# Patient Record
Sex: FEMALE | Employment: FULL TIME | URBAN - METROPOLITAN AREA
[De-identification: names, ages, dates, MRNs, and addresses within clinical notes are randomized per-mention and may not be internally consistent; named-entity substitution may affect disease eponyms.]

---

## 2017-01-30 ENCOUNTER — ALLSCRIPTS OFFICE VISIT (OUTPATIENT)
Dept: OTHER | Facility: OTHER | Age: 47
End: 2017-01-30

## 2017-01-30 ENCOUNTER — HOSPITAL ENCOUNTER (OUTPATIENT)
Dept: RADIOLOGY | Facility: HOSPITAL | Age: 47
Discharge: HOME | End: 2017-01-30
Payer: COMMERCIAL

## 2017-01-30 DIAGNOSIS — R06.02 SHORTNESS OF BREATH: ICD-10-CM

## 2017-01-30 PROCEDURE — 71020 HB CHEST X-RAY 2VW FRONTAL&LATL: CPT

## 2017-01-31 ENCOUNTER — GENERIC CONVERSION - ENCOUNTER (OUTPATIENT)
Dept: OTHER | Facility: OTHER | Age: 47
End: 2017-01-31

## 2017-03-09 ENCOUNTER — ALLSCRIPTS OFFICE VISIT (OUTPATIENT)
Dept: OTHER | Facility: OTHER | Age: 47
End: 2017-03-09

## 2017-03-09 ENCOUNTER — LAB REQUISITION (OUTPATIENT)
Dept: LAB | Facility: HOSPITAL | Age: 47
End: 2017-03-09
Payer: COMMERCIAL

## 2017-03-09 ENCOUNTER — GENERIC CONVERSION - ENCOUNTER (OUTPATIENT)
Dept: OTHER | Facility: OTHER | Age: 47
End: 2017-03-09

## 2017-03-09 DIAGNOSIS — Z11.3 ENCOUNTER FOR SCREENING FOR INFECTIONS WITH PREDOMINANTLY SEXUAL MODE OF TRANSMISSION: ICD-10-CM

## 2017-03-09 DIAGNOSIS — Z00.00 ENCOUNTER FOR GENERAL ADULT MEDICAL EXAMINATION WITHOUT ABNORMAL FINDINGS: ICD-10-CM

## 2017-03-09 DIAGNOSIS — R30.0 DYSURIA: ICD-10-CM

## 2017-03-09 PROCEDURE — 87591 N.GONORRHOEAE DNA AMP PROB: CPT | Performed by: OBSTETRICS & GYNECOLOGY

## 2017-03-09 PROCEDURE — 87491 CHLMYD TRACH DNA AMP PROBE: CPT | Performed by: OBSTETRICS & GYNECOLOGY

## 2017-03-09 PROCEDURE — 87624 HPV HI-RISK TYP POOLED RSLT: CPT | Performed by: OBSTETRICS & GYNECOLOGY

## 2017-03-09 PROCEDURE — G0145 SCR C/V CYTO,THINLAYER,RESCR: HCPCS | Performed by: OBSTETRICS & GYNECOLOGY

## 2017-03-09 PROCEDURE — 87086 URINE CULTURE/COLONY COUNT: CPT | Performed by: OBSTETRICS & GYNECOLOGY

## 2017-03-09 PROCEDURE — 87147 CULTURE TYPE IMMUNOLOGIC: CPT | Performed by: OBSTETRICS & GYNECOLOGY

## 2017-03-10 ENCOUNTER — LAB REQUISITION (OUTPATIENT)
Dept: LAB | Facility: HOSPITAL | Age: 47
End: 2017-03-10
Payer: COMMERCIAL

## 2017-03-10 DIAGNOSIS — Z11.3 ENCOUNTER FOR SCREENING FOR INFECTIONS WITH PREDOMINANTLY SEXUAL MODE OF TRANSMISSION: ICD-10-CM

## 2017-03-10 DIAGNOSIS — Z01.419 ENCOUNTER FOR GYNECOLOGICAL EXAMINATION WITHOUT ABNORMAL FINDING: ICD-10-CM

## 2017-03-10 DIAGNOSIS — Z00.00 ENCOUNTER FOR GENERAL ADULT MEDICAL EXAMINATION WITHOUT ABNORMAL FINDINGS: ICD-10-CM

## 2017-03-10 LAB
CHLAMYDIA DNA CVX QL NAA+PROBE: NORMAL
N GONORRHOEA DNA GENITAL QL NAA+PROBE: NORMAL

## 2017-03-14 LAB
BACTERIA UR CULT: NORMAL
BACTERIA UR CULT: NORMAL

## 2017-03-15 ENCOUNTER — GENERIC CONVERSION - ENCOUNTER (OUTPATIENT)
Dept: OTHER | Facility: OTHER | Age: 47
End: 2017-03-15

## 2017-03-15 LAB
HPV RRNA GENITAL QL NAA+PROBE: NORMAL
LAB AP GYN PRIMARY INTERPRETATION: NORMAL
Lab: NORMAL

## 2017-07-17 ENCOUNTER — ALLSCRIPTS OFFICE VISIT (OUTPATIENT)
Dept: OTHER | Facility: OTHER | Age: 47
End: 2017-07-17

## 2017-07-17 ENCOUNTER — APPOINTMENT (OUTPATIENT)
Dept: LAB | Facility: HOSPITAL | Age: 47
End: 2017-07-17
Payer: COMMERCIAL

## 2017-07-17 DIAGNOSIS — N93.8 OTHER SPECIFIED ABNORMAL UTERINE AND VAGINAL BLEEDING: ICD-10-CM

## 2017-07-17 DIAGNOSIS — I10 ESSENTIAL (PRIMARY) HYPERTENSION: ICD-10-CM

## 2017-07-17 LAB
ALBUMIN SERPL BCP-MCNC: 3.4 G/DL (ref 3.5–5)
ALP SERPL-CCNC: 64 U/L (ref 46–116)
ALT SERPL W P-5'-P-CCNC: 33 U/L (ref 12–78)
ANION GAP SERPL CALCULATED.3IONS-SCNC: 10 MMOL/L (ref 4–13)
AST SERPL W P-5'-P-CCNC: 26 U/L (ref 5–45)
BASOPHILS # BLD AUTO: 0.04 THOUSANDS/ΜL (ref 0–0.1)
BASOPHILS NFR BLD AUTO: 0 % (ref 0–1)
BILIRUB SERPL-MCNC: 0.32 MG/DL (ref 0.2–1)
BUN SERPL-MCNC: 9 MG/DL (ref 5–25)
CALCIUM SERPL-MCNC: 8.5 MG/DL (ref 8.3–10.1)
CHLORIDE SERPL-SCNC: 104 MMOL/L (ref 100–108)
CO2 SERPL-SCNC: 24 MMOL/L (ref 21–32)
CORTIS AM PEAK SERPL-MCNC: 8.3 UG/DL (ref 4.2–22.4)
CREAT SERPL-MCNC: 0.58 MG/DL (ref 0.6–1.3)
EOSINOPHIL # BLD AUTO: 0.2 THOUSAND/ΜL (ref 0–0.61)
EOSINOPHIL NFR BLD AUTO: 2 % (ref 0–6)
ERYTHROCYTE [DISTWIDTH] IN BLOOD BY AUTOMATED COUNT: 15.4 % (ref 11.6–15.1)
FSH SERPL-ACNC: 3.8 MIU/ML
GFR SERPL CREATININE-BSD FRML MDRD: >60 ML/MIN/1.73SQ M
GLUCOSE P FAST SERPL-MCNC: 98 MG/DL (ref 65–99)
HCT VFR BLD AUTO: 34.3 % (ref 34.8–46.1)
HGB BLD-MCNC: 10.8 G/DL (ref 11.5–15.4)
LH SERPL-ACNC: 9.2 MIU/ML
LYMPHOCYTES # BLD AUTO: 2.55 THOUSANDS/ΜL (ref 0.6–4.47)
LYMPHOCYTES NFR BLD AUTO: 28 % (ref 14–44)
MCH RBC QN AUTO: 26.5 PG (ref 26.8–34.3)
MCHC RBC AUTO-ENTMCNC: 31.5 G/DL (ref 31.4–37.4)
MCV RBC AUTO: 84 FL (ref 82–98)
MONOCYTES # BLD AUTO: 0.75 THOUSAND/ΜL (ref 0.17–1.22)
MONOCYTES NFR BLD AUTO: 8 % (ref 4–12)
NEUTROPHILS # BLD AUTO: 5.46 THOUSANDS/ΜL (ref 1.85–7.62)
NEUTS SEG NFR BLD AUTO: 62 % (ref 43–75)
NRBC BLD AUTO-RTO: 0 /100 WBCS
PLATELET # BLD AUTO: 348 THOUSANDS/UL (ref 149–390)
PMV BLD AUTO: 9.8 FL (ref 8.9–12.7)
POTASSIUM SERPL-SCNC: 4.1 MMOL/L (ref 3.5–5.3)
PROT SERPL-MCNC: 7 G/DL (ref 6.4–8.2)
RBC # BLD AUTO: 4.08 MILLION/UL (ref 3.81–5.12)
SODIUM SERPL-SCNC: 138 MMOL/L (ref 136–145)
TSH SERPL DL<=0.05 MIU/L-ACNC: 3.71 UIU/ML (ref 0.36–3.74)
WBC # BLD AUTO: 9.05 THOUSAND/UL (ref 4.31–10.16)

## 2017-07-17 PROCEDURE — 83002 ASSAY OF GONADOTROPIN (LH): CPT

## 2017-07-17 PROCEDURE — 85025 COMPLETE CBC W/AUTO DIFF WBC: CPT

## 2017-07-17 PROCEDURE — 82728 ASSAY OF FERRITIN: CPT

## 2017-07-17 PROCEDURE — 83540 ASSAY OF IRON: CPT

## 2017-07-17 PROCEDURE — 36415 COLL VENOUS BLD VENIPUNCTURE: CPT

## 2017-07-17 PROCEDURE — 83001 ASSAY OF GONADOTROPIN (FSH): CPT

## 2017-07-17 PROCEDURE — 80053 COMPREHEN METABOLIC PANEL: CPT

## 2017-07-17 PROCEDURE — 82533 TOTAL CORTISOL: CPT

## 2017-07-17 PROCEDURE — 84443 ASSAY THYROID STIM HORMONE: CPT

## 2017-07-18 ENCOUNTER — GENERIC CONVERSION - ENCOUNTER (OUTPATIENT)
Dept: OTHER | Facility: OTHER | Age: 47
End: 2017-07-18

## 2017-07-19 LAB
FERRITIN SERPL-MCNC: 7 NG/ML (ref 8–388)
IRON SERPL-MCNC: 53 UG/DL (ref 50–170)

## 2017-07-20 ENCOUNTER — GENERIC CONVERSION - ENCOUNTER (OUTPATIENT)
Dept: OTHER | Facility: OTHER | Age: 47
End: 2017-07-20

## 2017-08-31 DIAGNOSIS — N93.8 OTHER SPECIFIED ABNORMAL UTERINE AND VAGINAL BLEEDING: ICD-10-CM

## 2017-08-31 DIAGNOSIS — R53.83 OTHER FATIGUE: ICD-10-CM

## 2017-09-08 ENCOUNTER — GENERIC CONVERSION - ENCOUNTER (OUTPATIENT)
Dept: FAMILY MEDICINE CLINIC | Facility: CLINIC | Age: 47
End: 2017-09-08

## 2017-09-08 ENCOUNTER — GENERIC CONVERSION - ENCOUNTER (OUTPATIENT)
Dept: OTHER | Facility: OTHER | Age: 47
End: 2017-09-08

## 2017-10-14 DIAGNOSIS — N93.8 OTHER SPECIFIED ABNORMAL UTERINE AND VAGINAL BLEEDING: ICD-10-CM

## 2017-12-08 ENCOUNTER — ALLSCRIPTS OFFICE VISIT (OUTPATIENT)
Dept: OTHER | Facility: OTHER | Age: 47
End: 2017-12-08

## 2017-12-08 ENCOUNTER — APPOINTMENT (OUTPATIENT)
Dept: LAB | Facility: HOSPITAL | Age: 47
End: 2017-12-08
Payer: COMMERCIAL

## 2017-12-08 DIAGNOSIS — N93.8 OTHER SPECIFIED ABNORMAL UTERINE AND VAGINAL BLEEDING: ICD-10-CM

## 2017-12-08 LAB
BASOPHILS # BLD AUTO: 0.02 THOUSANDS/ΜL (ref 0–0.1)
BASOPHILS NFR BLD AUTO: 0 % (ref 0–1)
EOSINOPHIL # BLD AUTO: 0.15 THOUSAND/ΜL (ref 0–0.61)
EOSINOPHIL NFR BLD AUTO: 2 % (ref 0–6)
ERYTHROCYTE [DISTWIDTH] IN BLOOD BY AUTOMATED COUNT: 12.9 % (ref 11.6–15.1)
FERRITIN SERPL-MCNC: 35 NG/ML (ref 8–388)
HCT VFR BLD AUTO: 39.2 % (ref 34.8–46.1)
HGB BLD-MCNC: 12.6 G/DL (ref 11.5–15.4)
IRON SERPL-MCNC: 29 UG/DL (ref 50–170)
LYMPHOCYTES # BLD AUTO: 1.86 THOUSANDS/ΜL (ref 0.6–4.47)
LYMPHOCYTES NFR BLD AUTO: 23 % (ref 14–44)
MCH RBC QN AUTO: 30.3 PG (ref 26.8–34.3)
MCHC RBC AUTO-ENTMCNC: 32.1 G/DL (ref 31.4–37.4)
MCV RBC AUTO: 94 FL (ref 82–98)
MONOCYTES # BLD AUTO: 0.61 THOUSAND/ΜL (ref 0.17–1.22)
MONOCYTES NFR BLD AUTO: 8 % (ref 4–12)
NEUTROPHILS # BLD AUTO: 5.37 THOUSANDS/ΜL (ref 1.85–7.62)
NEUTS SEG NFR BLD AUTO: 67 % (ref 43–75)
NRBC BLD AUTO-RTO: 0 /100 WBCS
PLATELET # BLD AUTO: 327 THOUSANDS/UL (ref 149–390)
PMV BLD AUTO: 10.2 FL (ref 8.9–12.7)
RBC # BLD AUTO: 4.16 MILLION/UL (ref 3.81–5.12)
WBC # BLD AUTO: 8.05 THOUSAND/UL (ref 4.31–10.16)

## 2017-12-08 PROCEDURE — 82728 ASSAY OF FERRITIN: CPT

## 2017-12-08 PROCEDURE — 36415 COLL VENOUS BLD VENIPUNCTURE: CPT

## 2017-12-08 PROCEDURE — 83540 ASSAY OF IRON: CPT

## 2017-12-08 PROCEDURE — 85025 COMPLETE CBC W/AUTO DIFF WBC: CPT

## 2017-12-09 NOTE — PROGRESS NOTES
Assessment    1  Hypertension, essential (401 9) (I10)   2  Obesity (278 00) (E66 9)   3  Dysfunctional uterine bleeding (626 8) (N93 8)    Plan  Dysfunctional uterine bleeding    · (1) CBC/PLT/DIFF; Status:Active; Requested for:24Vza7690;    · (1) FERRITIN; Status:Active; Requested for:72Gdu8650;    · (1) IRON; Status:Active; Requested for:91Fzo8475;    · Routine Venipuncture - POC; Status:Active - Perform Order,Retrospective Authorization; Requested for:09Alt9006;   Hypertension, essential    · Follow-up visit in 4 Months Evaluation and Treatment  Follow-up  Status: Hold For -Scheduling,Retrospective Authorization  Requested for: [id number]    Discussion/Summary    Same meds, will be starting exercise program    Possible side effects of new medications were reviewed with the patient/guardian today  The treatment plan was reviewed with the patient/guardian  The patient/guardian understands and agrees with the treatment plan      Chief Complaint  hypertension follow up      History of Present Illness  The patient presents for follow-up of essential hypertension  The patient states she has been doing well with her blood pressure control since the last visit  She has no comorbid illnesses  She has no significant interval events  Symptoms: The patient is currently asymptomatic  Associated symptoms include headache-- and-- occ  Home monitoring: The patient is not checking blood pressure at home  Medications: the patient is adherent with her medication regimen  Disease Management: the patient is doing well with her blood pressure goals  Review of Systems   Constitutional: recent 11 lb weight loss, but-- not feeling poorly-- and-- not feeling tired  Cardiovascular: palpitations-- and-- in am before drinks coffee, but-- no chest pain  Respiratory: no shortness of breath  Gastrointestinal: no abdominal pain  Neurological: headache-- and-- occ at work  Active Problems  1  Abdominal pain (789 00) (R10 9)   2  Anxiety (300 00) (F41 9)   3  Dysfunctional uterine bleeding (626 8) (N93 8)   4  Fatigue (780 79) (R53 83)   5  Hypertension, essential (401 9) (I10)   6  Need for influenza vaccination (V04 81) (Z23)   7  Obesity (278 00) (E66 9)   8  Premenstrual syndrome (625 4) (N94 3)   9  Screen for STD (sexually transmitted disease) (V74 5) (Z11 3)   10  Shortness of breath (786 05) (R06 02)   11  Visit for screening mammogram (V76 12) (Z12 31)   12  Weight gain (783 1) (R63 5)    Past Medical History  1  History of Acute viral bronchitis (466 0) (J20 8)   2  History of Anxiety (300 00) (F41 9)   3  Denied: History of alcohol abuse   4  Denied: History of alcohol use   5  History of candidal vulvovaginitis (V13 29) (Z86 19)   6  History of mental disorder (V11 9) (Z86 59)   7  Denied: History of substance abuse   8  History of Pain In Flank (789 09)   9  History of Trichomonal vulvovaginitis (131 01) (A59 01)    The active problems and past medical history were reviewed and updated today  Surgical History  1  History of Appendectomy   2  History of Tubal Ligation    The surgical history was reviewed and updated today  Family History  Mother    1  Family history of Cancer   2  Denied: Family history of alcohol abuse   3  Family history of hypertension (V17 49) (Z82 49)   4  Denied: Family history of mental disorder   5  Denied: Family history of substance abuse  Father    6  Denied: Family history of alcohol abuse   7  Denied: Family history of mental disorder   8  Denied: Family history of substance abuse   9  Family history unknown (V49 89) (Z78 9)  Sister    8  Family history unknown (V49 89) (Z78 9)  Maternal Grandmother    6  Family history unknown (V49 89) (Z78 9)  Paternal Grandmother    15  Family history unknown (V49 89) (Z78 9)  Maternal Grandfather    15  Family history unknown (V49 89) (Z78 9)  Paternal Grandfather    15   Family history unknown (V49 89) (Z78 9)    The family history was reviewed and updated today  Social History     · Always uses seat belt   · Apartment   · [employer]   · Daily caffeine consumption, 4-5 servings a day   ·    · Employed   · Full-time employment   · Living alone (V60 3) (Z60 2)   · Never a smoker   · No advance directives (V49 89) (Z78 9)   · No drug use   · No living will   · One child   · Denied: History of Power of  in existence   · Primary language is English   · Single   · Social alcohol use (Z78 9)   · Supportive and safe   · Two children  The social history was reviewed and updated today  Current Meds   1  AmLODIPine Besylate 2 5 MG Oral Tablet; TAKE 1 TABLET DAILY; Therapy: 53NXD4779 to (MSJEXVGX:58OTR8735)  Requested for: 92CPV0136; Last Rx:60Ail6747 Ordered   2  Belviq 10 MG Oral Tablet; Take 1 tablet twice daily; Therapy: 33CDO8319 to (Evaluate:65Hqx4741); Last Rx:05Ejo2852 Ordered    The medication list was reviewed and updated today  Allergies  1  No Known Drug Allergies    Vitals  Vital Signs    Recorded: 98AJB6837 08:24AM   Heart Rate 72   Respiration 16   Systolic 697   Diastolic 80   Height 5 ft    Weight 167 lb    BMI Calculated 32 62   BSA Calculated 1 73       Physical Exam   Constitutional  General appearance: Abnormal   appears healthy-- and-- obese  Pulmonary  Auscultation of lungs: Clear to auscultation  Cardiovascular  Auscultation of heart: Normal rate and rhythm, normal S1 and S2, without murmurs  Examination of extremities for edema and/or varicosities: Normal    Carotid pulses: Normal    Lymphatic  Palpation of lymph nodes in neck: No lymphadenopathy  Signatures   Electronically signed by : Sorin Turk, ; Dec  8 2017  8:38AM EST                       (Author)    Electronically signed by :  Pastor Viviane MD; Dec  8 2017  8:46AM EST                       (Author)

## 2017-12-10 ENCOUNTER — GENERIC CONVERSION - ENCOUNTER (OUTPATIENT)
Dept: OTHER | Facility: OTHER | Age: 47
End: 2017-12-10

## 2017-12-22 ENCOUNTER — APPOINTMENT (EMERGENCY)
Dept: RADIOLOGY | Facility: HOSPITAL | Age: 47
End: 2017-12-22
Payer: COMMERCIAL

## 2017-12-22 ENCOUNTER — HOSPITAL ENCOUNTER (EMERGENCY)
Facility: HOSPITAL | Age: 47
Discharge: HOME | End: 2017-12-22
Attending: EMERGENCY MEDICINE | Admitting: EMERGENCY MEDICINE
Payer: COMMERCIAL

## 2017-12-22 VITALS
TEMPERATURE: 97.8 F | HEART RATE: 68 BPM | WEIGHT: 160 LBS | OXYGEN SATURATION: 96 % | RESPIRATION RATE: 18 BRPM | SYSTOLIC BLOOD PRESSURE: 127 MMHG | DIASTOLIC BLOOD PRESSURE: 62 MMHG

## 2017-12-22 DIAGNOSIS — F41.9 ANXIETY: ICD-10-CM

## 2017-12-22 DIAGNOSIS — R42 LIGHTHEADED: Primary | ICD-10-CM

## 2017-12-22 LAB
ALBUMIN SERPL BCP-MCNC: 3.6 G/DL (ref 3.5–5)
ALP SERPL-CCNC: 63 U/L (ref 46–116)
ALT SERPL W P-5'-P-CCNC: 37 U/L (ref 12–78)
ANION GAP SERPL CALCULATED.3IONS-SCNC: 11 MMOL/L (ref 4–13)
AST SERPL W P-5'-P-CCNC: 24 U/L (ref 5–45)
ATRIAL RATE: 71 BPM
BASOPHILS # BLD AUTO: 0.03 THOUSANDS/ΜL (ref 0–0.1)
BASOPHILS NFR BLD AUTO: 0 % (ref 0–1)
BILIRUB SERPL-MCNC: 0.23 MG/DL (ref 0.2–1)
BUN SERPL-MCNC: 8 MG/DL (ref 5–25)
CALCIUM SERPL-MCNC: 9.4 MG/DL (ref 8.3–10.1)
CHLORIDE SERPL-SCNC: 103 MMOL/L (ref 100–108)
CO2 SERPL-SCNC: 25 MMOL/L (ref 21–32)
CREAT SERPL-MCNC: 0.65 MG/DL (ref 0.6–1.3)
EOSINOPHIL # BLD AUTO: 0.13 THOUSAND/ΜL (ref 0–0.61)
EOSINOPHIL NFR BLD AUTO: 1 % (ref 0–6)
ERYTHROCYTE [DISTWIDTH] IN BLOOD BY AUTOMATED COUNT: 13 % (ref 11.6–15.1)
GFR SERPL CREATININE-BSD FRML MDRD: 107 ML/MIN/1.73SQ M
GLUCOSE SERPL-MCNC: 100 MG/DL (ref 65–140)
HCT VFR BLD AUTO: 36.6 % (ref 34.8–46.1)
HGB BLD-MCNC: 12.1 G/DL (ref 11.5–15.4)
LYMPHOCYTES # BLD AUTO: 2.67 THOUSANDS/ΜL (ref 0.6–4.47)
LYMPHOCYTES NFR BLD AUTO: 26 % (ref 14–44)
MCH RBC QN AUTO: 30.9 PG (ref 26.8–34.3)
MCHC RBC AUTO-ENTMCNC: 33.1 G/DL (ref 31.4–37.4)
MCV RBC AUTO: 93 FL (ref 82–98)
MONOCYTES # BLD AUTO: 0.7 THOUSAND/ΜL (ref 0.17–1.22)
MONOCYTES NFR BLD AUTO: 7 % (ref 4–12)
NEUTROPHILS # BLD AUTO: 6.71 THOUSANDS/ΜL (ref 1.85–7.62)
NEUTS SEG NFR BLD AUTO: 66 % (ref 43–75)
NRBC BLD AUTO-RTO: 0 /100 WBCS
P AXIS: 67 DEGREES
PLATELET # BLD AUTO: 312 THOUSANDS/UL (ref 149–390)
PMV BLD AUTO: 9.6 FL (ref 8.9–12.7)
POTASSIUM SERPL-SCNC: 3.7 MMOL/L (ref 3.5–5.3)
PR INTERVAL: 162 MS
PROT SERPL-MCNC: 7.4 G/DL (ref 6.4–8.2)
QRS AXIS: 44 DEGREES
QRSD INTERVAL: 92 MS
QT INTERVAL: 390 MS
QTC INTERVAL: 423 MS
RBC # BLD AUTO: 3.92 MILLION/UL (ref 3.81–5.12)
SODIUM SERPL-SCNC: 139 MMOL/L (ref 136–145)
SPECIMEN SOURCE: NORMAL
T WAVE AXIS: 34 DEGREES
TROPONIN I BLD-MCNC: 0 NG/ML (ref 0–0.08)
VENTRICULAR RATE: 71 BPM
WBC # BLD AUTO: 10.24 THOUSAND/UL (ref 4.31–10.16)

## 2017-12-22 PROCEDURE — 71020 HB CHEST X-RAY 2VW FRONTAL&LATL: CPT

## 2017-12-22 PROCEDURE — 84484 ASSAY OF TROPONIN QUANT: CPT

## 2017-12-22 PROCEDURE — 36415 COLL VENOUS BLD VENIPUNCTURE: CPT | Performed by: EMERGENCY MEDICINE

## 2017-12-22 PROCEDURE — 93005 ELECTROCARDIOGRAM TRACING: CPT | Performed by: EMERGENCY MEDICINE

## 2017-12-22 PROCEDURE — 85025 COMPLETE CBC W/AUTO DIFF WBC: CPT | Performed by: EMERGENCY MEDICINE

## 2017-12-22 PROCEDURE — 96361 HYDRATE IV INFUSION ADD-ON: CPT

## 2017-12-22 PROCEDURE — 80053 COMPREHEN METABOLIC PANEL: CPT | Performed by: EMERGENCY MEDICINE

## 2017-12-22 PROCEDURE — 99285 EMERGENCY DEPT VISIT HI MDM: CPT

## 2017-12-22 PROCEDURE — 93005 ELECTROCARDIOGRAM TRACING: CPT

## 2017-12-22 PROCEDURE — 96374 THER/PROPH/DIAG INJ IV PUSH: CPT

## 2017-12-22 RX ORDER — ONDANSETRON 4 MG/1
4 TABLET, ORALLY DISINTEGRATING ORAL EVERY 8 HOURS PRN
Qty: 10 TABLET | Refills: 0 | Status: SHIPPED | OUTPATIENT
Start: 2017-12-22 | End: 2018-04-03

## 2017-12-22 RX ORDER — ONDANSETRON 2 MG/ML
4 INJECTION INTRAMUSCULAR; INTRAVENOUS ONCE
Status: COMPLETED | OUTPATIENT
Start: 2017-12-22 | End: 2017-12-22

## 2017-12-22 RX ORDER — AMLODIPINE BESYLATE 2.5 MG/1
1 TABLET ORAL DAILY
COMMUNITY
Start: 2017-07-17 | End: 2018-05-13 | Stop reason: SDUPTHER

## 2017-12-22 RX ORDER — LORAZEPAM 2 MG/ML
0.5 INJECTION INTRAMUSCULAR ONCE
Status: DISCONTINUED | OUTPATIENT
Start: 2017-12-22 | End: 2017-12-22 | Stop reason: HOSPADM

## 2017-12-22 RX ORDER — HYDROXYZINE PAMOATE 25 MG/1
25 CAPSULE ORAL EVERY 8 HOURS PRN
Qty: 15 CAPSULE | Refills: 0 | Status: SHIPPED | OUTPATIENT
Start: 2017-12-22 | End: 2018-04-03

## 2017-12-22 RX ADMIN — ONDANSETRON 4 MG: 2 INJECTION INTRAMUSCULAR; INTRAVENOUS at 14:14

## 2017-12-22 RX ADMIN — SODIUM CHLORIDE 1000 ML: 0.9 INJECTION, SOLUTION INTRAVENOUS at 14:57

## 2017-12-22 NOTE — ED PROVIDER NOTES
History  Chief Complaint   Patient presents with    Chest Pain     Pt states she woke up this morning and had nausea and and "upset stomach", went to work, left work early and when she got home felt itght in her chest, center to left side  called 911, upon EMS arrvial pt hyperventilating  upon arrival to ED pt with normal respiration, still c/o 6/10 chest tightness, reports same 2 years ago with negative cardiac w/u  Pt states that she took 2 aspirin tablets at home, unknown dose, along with "generic pamprin because I had a headache", states it contains tylenol  71-year-old female with no past medical history presents to the emergency department with nausea, lightheadedness and chest tightness  Patient states she was not feeling well this morning  She states she felt lightheaded and nauseous  She went to work and left at 11 secondary to the above symptoms  She laid down and felt like her heart was racing and then had bilateral arm numbness and chest tightness which prompted her visit to the emergency department  She has had no fevers, chills  No shortness of breath  No URI symptoms  She states her doctor started her on a non stimulant weight loss pill  She states she had similar symptoms about 2 years ago and was diagnosed with anxiety  History provided by:  Patient   used: No    Chest Pain   Chest pain location: Entire precordium    Pain quality: tightness    Pain radiates to:  Does not radiate  Pain radiates to the back: no    Pain severity:  Mild  Onset quality:  Gradual  Duration:  3 hours  Timing:  Constant  Progression:  Improving  Chronicity:  Recurrent  Context: at rest    Context: not breathing, not lifting, no movement and no stress    Relieved by:  None tried  Worsened by:  Nothing tried  Ineffective treatments:  None tried  Associated symptoms: nausea, numbness and palpitations    Associated symptoms: no abdominal pain, no altered mental status, no anorexia, no anxiety, no back pain, no claudication, no cough, no diaphoresis, no dizziness, no fatigue, no fever, no headache, no heartburn, no lower extremity edema, no near-syncope, no orthopnea, no PND, no shortness of breath, no syncope, not vomiting and no weakness    Risk factors: hypertension and obesity    Risk factors: no coronary artery disease, no diabetes mellitus and no smoking        Prior to Admission Medications   Prescriptions Last Dose Informant Patient Reported? Taking? Lorcaserin HCl (BELVIQ) 10 MG TABS   Yes Yes   Sig: Take 1 tablet by mouth 2 (two) times a day   amLODIPine (NORVASC) 2 5 mg tablet   Yes Yes   Sig: Take 1 tablet by mouth daily      Facility-Administered Medications: None       Past Medical History:   Diagnosis Date    Hypertension        Past Surgical History:   Procedure Laterality Date    APPENDECTOMY      TUBAL LIGATION         History reviewed  No pertinent family history  I have reviewed and agree with the history as documented  Social History   Substance Use Topics    Smoking status: Never Smoker    Smokeless tobacco: Never Used    Alcohol use Yes      Comment: occasional         Review of Systems   Constitutional: Negative  Negative for chills, diaphoresis, fatigue and fever  HENT: Negative  Negative for congestion, rhinorrhea and sore throat  Eyes: Negative  Negative for discharge, redness and itching  Respiratory: Negative  Negative for apnea, cough, chest tightness, shortness of breath and wheezing  Cardiovascular: Positive for chest pain and palpitations  Negative for orthopnea, claudication, leg swelling, syncope, PND and near-syncope  Gastrointestinal: Positive for nausea  Negative for abdominal pain, anorexia, heartburn and vomiting  Endocrine: Negative  Genitourinary: Negative  Negative for flank pain, frequency and urgency  Musculoskeletal: Negative  Negative for back pain  Skin: Negative  Allergic/Immunologic: Negative  Neurological: Positive for light-headedness and numbness  Negative for dizziness, tremors, syncope, facial asymmetry, speech difficulty, weakness and headaches  Hematological: Negative  All other systems reviewed and are negative  Physical Exam  ED Triage Vitals [12/22/17 1250]   Temperature Pulse Respirations Blood Pressure SpO2   97 8 °F (36 6 °C) 68 18 138/82 97 %      Temp Source Heart Rate Source Patient Position - Orthostatic VS BP Location FiO2 (%)   Oral Monitor Sitting Right arm --      Pain Score       6           Orthostatic Vital Signs  Vitals:    12/22/17 1250 12/22/17 1430 12/22/17 1457   BP: 138/82  127/62   Pulse: 68 64 68   Patient Position - Orthostatic VS: Sitting         Physical Exam    ED Medications  Medications   LORazepam (ATIVAN) 2 mg/mL injection 0 5 mg (0 5 mg Intravenous Not Given 12/22/17 1417)   ondansetron (ZOFRAN) injection 4 mg (4 mg Intravenous Given 12/22/17 1414)   sodium chloride 0 9 % bolus 1,000 mL (0 mL Intravenous Stopped 12/22/17 1551)       Diagnostic Studies  Results Reviewed     Procedure Component Value Units Date/Time    Comprehensive metabolic panel [[id number]] Collected:  12/22/17 1257    Lab Status:  Final result Specimen:  Blood from Arm, Right Updated:  12/22/17 1321     Sodium 139 mmol/L      Potassium 3 7 mmol/L      Chloride 103 mmol/L      CO2 25 mmol/L      Anion Gap 11 mmol/L      BUN 8 mg/dL      Creatinine 0 65 mg/dL      Glucose 100 mg/dL      Calcium 9 4 mg/dL      AST 24 U/L      ALT 37 U/L      Alkaline Phosphatase 63 U/L      Total Protein 7 4 g/dL      Albumin 3 6 g/dL      Total Bilirubin 0 23 mg/dL      eGFR 107 ml/min/1 73sq m     Narrative:         National Kidney Disease Education Program recommendations are as follows:  GFR calculation is accurate only with a steady state creatinine  Chronic Kidney disease less than 60 ml/min/1 73 sq  meters  Kidney failure less than 15 ml/min/1 73 sq  meters      POCT troponin [[id number]]  (Normal) Collected:  12/22/17 1259    Lab Status:  Final result Updated:  12/22/17 1314     POC Troponin I 0 00 ng/ml      Specimen Type VENOUS    Narrative:         [employer] i-Stat handheld analyzer 99% cutoff is > 0 08ng/mL in [facility]    o cTnI 99% cutoff is useful only when applied to patients in the clinical setting of myocardial ischemia  o cTnI 99% cutoff should be interpreted in the context of clinical history, ECG findings and possibly cardiac imaging to establish correct diagnosis  o cTnI 99% cutoff may be suggestive but clearly not indicative of a coronary event without the clinical setting of myocardial ischemia  CBC and differential [[id number]]  (Abnormal) Collected:  12/22/17 1257    Lab Status:  Final result Specimen:  Blood from Arm, Right Updated:  12/22/17 1305     WBC 10 24 (H) Thousand/uL      RBC 3 92 Million/uL      Hemoglobin 12 1 g/dL      Hematocrit 36 6 %      MCV 93 fL      MCH 30 9 pg      MCHC 33 1 g/dL      RDW 13 0 %      MPV 9 6 fL      Platelets 510 Thousands/uL      nRBC 0 /100 WBCs      Neutrophils Relative 66 %      Lymphocytes Relative 26 %      Monocytes Relative 7 %      Eosinophils Relative 1 %      Basophils Relative 0 %      Neutrophils Absolute 6 71 Thousands/µL      Lymphocytes Absolute 2 67 Thousands/µL      Monocytes Absolute 0 70 Thousand/µL      Eosinophils Absolute 0 13 Thousand/µL      Basophils Absolute 0 03 Thousands/µL                  X-ray chest 2 views   Final Result by KIRK Nuñez MD (12/22 1341)      No active pulmonary disease            Workstation performed: [id number]                    Procedures  Procedures       Phone Contacts  ED Phone Contact    ED Course  ED Course as of Dec 22 1613   Fri Dec 22, 2017   1453 Patient still feeling lightheaded  Will give IV fluids                                  MDM  Number of Diagnoses or Management Options  Diagnosis management comments: 67-year-old female presents with palpitations, chest tightness and bilateral arm numbness that started today at about 11:00 a m  Her symptoms are starting to improve  She does also complain of lightheadedness and nausea  On exam she appears in no acute distress and her exam is normal   Her EKG although abnormal, is completely unchanged from an EKG in 2015  Cardiac workup already in progress  I do not believe her symptoms are secondary to acute coronary syndrome  Her symptoms may be related to anxiety       Amount and/or Complexity of Data Reviewed  Clinical lab tests: ordered and reviewed  Tests in the radiology section of CPT®: ordered and reviewed  Independent visualization of images, tracings, or specimens: yes      CritCare Time    Disposition  Final diagnoses:   Lightheaded   Anxiety     Time reflects when diagnosis was documented in both MDM as applicable and the Disposition within this note     Time User Action Codes Description Comment    12/22/2017  4:11 PM Zuly Mccray Add [R42] Lightheaded     12/22/2017  4:12 PM Jj ESTEBAN Add [F41 9] Anxiety       ED Disposition     ED Disposition Condition Comment    Discharge  Enid Ramirez discharge to home/self care  Condition at discharge: Good        Follow-up Information     Follow up With Specialties Details Why Contact Info    Vanessa Johnson PA-C Internal Medicine Schedule an appointment as soon as possible for a visit in 2 days If symptoms worsen or return to the emergency department [phone] [facility]  [phone]          Patient's Medications   Discharge Prescriptions    HYDROXYZINE PAMOATE (VISTARIL) 25 MG CAPSULE    Take 1 capsule by mouth every 8 (eight) hours as needed for anxiety       Start Date: 12/22/2017End Date: --       Order Dose: 25 mg       Quantity: 15 capsule    Refills: 0    ONDANSETRON (ZOFRAN-ODT) 4 MG DISINTEGRATING TABLET    Take 1 tablet by mouth every 8 (eight) hours as needed for nausea or vomiting       Start Date: 12/22/2017End Date: --       Order Dose: 4 mg       Quantity: 10 tablet    Refills: 0     No discharge procedures on file      ED Provider  Electronically Signed by           Volodymyr Martinez DO  12/22/17 5442

## 2017-12-22 NOTE — ED PROCEDURE NOTE
PROCEDURE  ECG 12 Lead Documentation  Date/Time: 12/22/2017 1:39 PM  Performed by: Srinath Wade  Authorized by: Srinath Wade     ECG reviewed by me, the ED Provider: yes    Patient location:  ED  Previous ECG:     Previous ECG:  Compared to current    Comparison ECG info:  7/2015    Similarity:  No change  Interpretation:     Interpretation: non-specific    Rate:     ECG rate assessment: normal    Rhythm:     Rhythm: sinus rhythm    Ectopy:     Ectopy: none    QRS:     QRS axis:  Normal  Conduction:     Conduction: normal    ST segments:     ST segments:  Normal  T waves:     T waves: flattening and inverted      Inverted:  V1, V2 and V3

## 2017-12-22 NOTE — DISCHARGE INSTRUCTIONS
Anxiety   WHAT YOU NEED TO KNOW:   Anxiety is a condition that causes you to feel extremely worried or nervous  The feelings are so strong that they can cause problems with your daily activities or sleep  Anxiety may be triggered by something you fear, or it may happen without a cause  Family or work stress, smoking, caffeine, and alcohol can increase your risk for anxiety  Certain medicines or health conditions can also increase your risk  Anxiety can become a long-term condition if it is not managed or treated  DISCHARGE INSTRUCTIONS:   Call 911 if:   · You have chest pain, tightness, or heaviness that may spread to your shoulders, arms, jaw, neck, or back  · You feel like hurting yourself or someone else  Contact your healthcare provider if:   · Your symptoms get worse or do not get better with treatment  · Your anxiety keeps you from doing your regular daily activities  · You have new symptoms since your last visit  · You have questions or concerns about your condition or care  Medicines:   · Medicines  may be given to help you feel more calm and relaxed, and decrease your symptoms  · Take your medicine as directed  Contact your healthcare provider if you think your medicine is not helping or if you have side effects  Tell him of her if you are allergic to any medicine  Keep a list of the medicines, vitamins, and herbs you take  Include the amounts, and when and why you take them  Bring the list or the pill bottles to follow-up visits  Carry your medicine list with you in case of an emergency  Follow up with your healthcare provider within 2 weeks or as directed:  Write down your questions so you remember to ask them during your visits  Manage anxiety:   · Talk to someone about your anxiety  Your healthcare provider may suggest counseling  Cognitive behavioral therapy can help you understand and change how you react to events that trigger your symptoms   You might feel more comfortable talking with a friend or family member about your anxiety  Choose someone you know will be supportive and encouraging  · Find ways to relax  Activities such as exercise, meditation, or listening to music can help you relax  Spend time with friends, or do things you enjoy  · Practice deep breathing  Deep breathing can help you relax when you feel anxious  Focus on taking slow, deep breaths several times a day, or during an anxiety attack  Breathe in through your nose and out through your mouth  · Create a regular sleep routine  Regular sleep can help you feel calmer during the day  Go to sleep and wake up at the same times every day  Do not watch television or use the computer right before bed  Your room should be comfortable, dark, and quiet  · Eat a variety of healthy foods  Healthy foods include fruits, vegetables, low-fat dairy products, lean meats, fish, whole-grain breads, and cooked beans  Healthy foods can help you feel less anxious and have more energy  · Exercise regularly  Exercise can increase your energy level  Exercise may also lift your mood and help you sleep better  Your healthcare provider can help you create an exercise plan  · Do not smoke  Nicotine and other chemicals in cigarettes and cigars can increase anxiety  Ask your healthcare provider for information if you currently smoke and need help to quit  E-cigarettes or smokeless tobacco still contain nicotine  Talk to your healthcare provider before you use these products  · Do not have caffeine  Caffeine can make your symptoms worse  Do not have foods or drinks that are meant to increase your energy level  · Limit or do not drink alcohol  Ask your healthcare provider if alcohol is safe for you  You may not be able to drink alcohol if you take certain anxiety or depression medicines  Limit alcohol to 1 drink per day if you are a woman  Limit alcohol to 2 drinks per day if you are a man   A drink of alcohol is 12 ounces of beer, 5 ounces of wine, or 1½ ounces of liquor  · Do not use drugs  Drugs can make your anxiety worse  It can also make anxiety hard to manage  Talk to your healthcare provider if you use drugs and want help to quit  © 2017 [employer] Information is for End User's use only and may not be sold, redistributed or otherwise used for commercial purposes  All illustrations and images included in CareNotes® are the copyrighted property of [employer]  or [employer] Chavez  The above information is an  only  It is not intended as medical advice for individual conditions or treatments  Talk to your doctor, nurse or pharmacist before following any medical regimen to see if it is safe and effective for you  Lightheadedness   WHAT YOU NEED TO KNOW:   Lightheadedness is the feeling that you may faint, but you do not  Your heartbeat may be fast or feel like it flutters  Lightheadedness may occur when you take certain medicines, such as medicine to lower your blood pressure  Dehydration, low sodium, low blood sugar, an abnormal heart rhythm, and anxiety are other common causes  DISCHARGE INSTRUCTIONS:   Return to the emergency department if:   · You have sudden chest pain  · You have trouble breathing or shortness of breath  · You have vision changes, are sweating, and have nausea while you are sitting or lying down  · You feel flushed and your heart is fluttering  · You faint  Contact your healthcare provider if:   · You feel lightheaded often  · Your heart beats faster or slower than usual      · You have questions or concerns about your condition or care  Follow up with your healthcare provider as directed: You may need more tests to help find the cause of your lightheadedness  The tests will help healthcare providers plan the best treatment for you  Write down your questions so you remember to ask them during your visits     Self-care:  Talk with your healthcare provider about these and other ways to manage your symptoms:  · Lie down  when you feel lightheaded, your throat gets tight, or your vision changes  Raise your legs above the level of your heart  · Stand up slowly  Sit on the side of the bed or couch for a few minutes before you stand up  · Take slow, deep breaths when you feel lightheaded  This can help decrease the feeling that you might faint  · Ask if you need to avoid hot baths and saunas  These may make your symptoms worse  Watch for signs of low blood sugar: These include hunger, nervousness, sweating, and fast or fluttery heartbeats  Talk with your healthcare provider about ways to keep your blood sugar level steady  Check your blood pressure often:  You should do this especially if you take medicine to lower your blood pressure  Check your blood pressure when you are lying down and when you are standing  Ask how often to check during the day  Keep a record of your blood pressure numbers  Your healthcare provider may use the record to help plan your treatment  Keep a record of your lightheadedness episodes:  Include your symptoms and your activity before and after the episode  The record can help your healthcare provider find the cause of your lightheadedness and help you manage episodes  © 2017 [employer] [facility] Information is for End User's use only and may not be sold, redistributed or otherwise used for commercial purposes  All illustrations and images included in CareNotes® are the copyrighted property of [employer]  or Rich Chavez  The above information is an  only  It is not intended as medical advice for individual conditions or treatments  Talk to your doctor, nurse or pharmacist before following any medical regimen to see if it is safe and effective for you

## 2018-01-02 ENCOUNTER — GENERIC CONVERSION - ENCOUNTER (OUTPATIENT)
Dept: OTHER | Facility: OTHER | Age: 48
End: 2018-01-02

## 2018-01-09 NOTE — RESULT NOTES
Verified Results  (1) CBC/PLT/DIFF [id number] 04:05PM [employer] Order Number: [id number]     Test Name Result Flag Reference   WBC COUNT 9 05 Thousand/uL  4 31-10 16   RBC COUNT 4 08 Million/uL  3 81-5 12   HEMOGLOBIN 10 8 g/dL L 11 5-15 4   HEMATOCRIT 34 3 % L 34 8-46  1   MCV 84 fL  82-98   MCH 26 5 pg L 26 8-34 3   MCHC 31 5 g/dL  31 4-37 4   RDW 15 4 % H 11 6-15 1   MPV 9 8 fL  8 9-12 7   PLATELET COUNT 601 Thousands/uL  149-390   nRBC AUTOMATED 0 /100 WBCs     NEUTROPHILS RELATIVE PERCENT 62 %  43-75   LYMPHOCYTES RELATIVE PERCENT 28 %  14-44   MONOCYTES RELATIVE PERCENT 8 %  4-12   EOSINOPHILS RELATIVE PERCENT 2 %  0-6   BASOPHILS RELATIVE PERCENT 0 %  0-1   NEUTROPHILS ABSOLUTE COUNT 5 46 Thousands/? ??L  1 85-7 62   LYMPHOCYTES ABSOLUTE COUNT 2 55 Thousands/? ??L  0 60-4 47   MONOCYTES ABSOLUTE COUNT 0 75 Thousand/? ??L  0 17-1 22   EOSINOPHILS ABSOLUTE COUNT 0 20 Thousand/? ??L  0 00-0 61   BASOPHILS ABSOLUTE COUNT 0 04 Thousands/? ??L  0 00-0 10     (1) COMPREHENSIVE METABOLIC PANEL [id number] 83:28FX [employer] Order Number: [id number]     Test Name Result Flag Reference   SODIUM 138 mmol/L  136-145   POTASSIUM 4 1 mmol/L  3 5-5 3   CHLORIDE 104 mmol/L  100-108   CARBON DIOXIDE 24 mmol/L  21-32   ANION GAP (CALC) 10 mmol/L  4-13   BLOOD UREA NITROGEN 9 mg/dL  5-25   CREATININE 0 58 mg/dL L 0 60-1 30   Standardized to IDMS reference method   CALCIUM 8 5 mg/dL  8 3-10 1   BILI, TOTAL 0 32 mg/dL  0 20-1 00   ALK PHOSPHATAS 64 U/L     ALT (SGPT) 33 U/L  12-78   AST(SGOT) 26 U/L  5-45   ALBUMIN 3 4 g/dL L 3 5-5 0   TOTAL PROTEIN 7 0 g/dL  6 4-8 2   eGFR Non-African American      >60 0 ml/min/1 73sq [facility] Disease Education Program recommendations are as follows:  GFR calculation is accurate only with a steady state creatinine  Chronic Kidney disease less than 60 ml/min/1 73 sq  meters  Kidney failure less than 15 ml/min/1 73 sq  meters     GLUCOSE FASTING 98 mg/dL 65-99     (1) TSH [id number] 04:05PM [facility] Order Number: [id number]     Test Name Result Flag Reference   TSH 3 710 uIU/mL  0 358-3 740   Patients undergoing fluorescein dye angiography may retain small amounts of fluorescein in the body for 48-72 hours post procedure  Samples containing fluorescein can produce falsely depressed TSH values  If the patient had this procedure,a specimen should be resubmitted post fluorescein clearance  The recommended reference ranges for TSH during pregnancy are as follows:  First trimester 0 1 to 2 5 uIU/mL  Second trimester  0 2 to 3 0 uIU/mL  Third trimester 0 3 to 3 0 uIU/m     (1) 100 [facility] 04:05PM [facility] Order Number: [id number]     Test Name Result Flag Reference   FOLLICLE STIMULATING HORMONE 3 8 mIU/mL     FSH:  Menstruating Females: Follicular Phase  1 2-39 6 mIU/mL    Mid-Cycle Phase   5 2-17 5 mIU/mL    Luteal Phase      1 7-9 5  mIU/mL  Postmenopausal Females    On Menopausal Hormone Therapy(HRT) 5 9-72 8   mIU/mL    Untreated                          12 7-132 2 mIU/mL     (1) LH (LEUTINIZING HORMONE) [id number] 04:05PM [facility] Order Number: [id number]     Test Name Result Flag Reference   LUTEINIZING HORMONE 9 2 mIU/mL     LH:   Menstruating Females:   ??? Follicular Phase ? ??1 9-12 8 ? ? ? mIU/mL   ? ?? Mid-Cycle Phase ? ?? 22 8-76 1 mIU/mL   ? ?? Luteal Phase ??? ??? ???0 6-13 5 ? ? ? mIU/mL   Postmenopausal Females:   ??? On Menopausal Hormone Therapy(MHT) 1 1-52 4 mIU/mL   ? ?? Untreated ??? ??? ??? ??? ??? ??? ??? ??? ??? ??? ??? ??? ???8 6-61 8 mIU/mL     (1) CORTISOL AM SPECIMEN [id number] 04:05PM [facility] Order Number: [id number]     Test Name Result Flag Reference   CORTISO AM SPEC 8 3 ug/dL  4 2-22 4   Reference ranges established for specimens drawn between 7 and 9 am  Results may be inaccurate if timing is not correct

## 2018-01-11 NOTE — RESULT NOTES
Verified Results  (1) IRON [id number] 04:05PM [employer]     Test Name Result Flag Reference   IRON 53 ug/dL     Patients treated with metal-binding drugs (ie  Deferoxamine) may have depressed iron values  (1) FERRITIN [id number] 04:05PM [employer] [facility]     Test Name Result Flag Reference   FERRITIN 7 ng/mL L 8-388       Plan  Dysfunctional uterine bleeding, Fatigue    · (1) CBC/PLT/DIFF; Status:Active; Requested for:02Tlv2180;    · (1) FERRITIN; Status:Active; Requested for:26Xkl3622;    · (1) IRON; Status:Active;  Requested for:89Hgb8858;

## 2018-01-13 VITALS
WEIGHT: 177.13 LBS | RESPIRATION RATE: 18 BRPM | TEMPERATURE: 98 F | BODY MASS INDEX: 34.77 KG/M2 | HEIGHT: 60 IN | HEART RATE: 88 BPM | SYSTOLIC BLOOD PRESSURE: 144 MMHG | DIASTOLIC BLOOD PRESSURE: 92 MMHG

## 2018-01-13 VITALS
BODY MASS INDEX: 33.59 KG/M2 | DIASTOLIC BLOOD PRESSURE: 90 MMHG | WEIGHT: 171.08 LBS | HEIGHT: 60 IN | HEART RATE: 76 BPM | TEMPERATURE: 98 F | SYSTOLIC BLOOD PRESSURE: 124 MMHG

## 2018-01-13 NOTE — MISCELLANEOUS
Message  telephone call to patient to inform of UTI and need for treatment  Will pick-up script and start ASAP      Active Problems    1  Abdominal pain (789 00) (R10 9)   2  Altered bowel habits (787 99) (R19 4)   3  Anxiety (300 00) (F41 9)   4  Dysuria (788 1) (R30 0)   5  Fatigue (780 79) (R53 83)   6  Lower resp  tract infection (519 8) (J22)   7  Need for influenza vaccination (V04 81) (Z23)   8  Obesity (278 00) (E66 9)   9  Pelvic pain in female (625 9) (R10 2)   10  Premenstrual syndrome (625 4) (N94 3)   11  Screen for STD (sexually transmitted disease) (V74 5) (Z11 3)   12  Shortness of breath (786 05) (R06 02)   13  UTI (urinary tract infection) (599 0) (N39 0)   14  Visit for screening mammogram (V76 12) (Z12 31)    Current Meds   1  Ampicillin 500 MG Oral Capsule; TAKE 1 CAPSULE 4 TIMES DAILY; Therapy: 32CIH1120 to (Evaluate:18Mar2017)  Requested for: 16MHK0752; Last   Rx:15Mar2017 Ordered   2  Benzonatate 100 MG Oral Capsule; TAKE 1 CAPSULE 3 TIMES DAILY AS NEEDED; Therapy: 20UBA6452 to (Evaluate:09Jan2017)  Requested for: 03Egi6998; Last   Rx:30Dec2016 Ordered   3  Ventolin  (90 Base) MCG/ACT Inhalation Aerosol Solution; inhale 1 to 2 puffs   every 4 to 6 hours if needed; Therapy: 65SPP1911 to (Last Rx:30Jan2017)  Requested for: 30Jan2017 Ordered    Allergies    1   No Known Drug Allergies    Signatures   Electronically signed by : Rose Mancera RN; Mar 15 2017  3:47PM EST                       (Author)

## 2018-01-16 NOTE — MISCELLANEOUS
Message  Return to work or school:   Remigio Condon is under my professional care  She was seen in my office on 1/30/2017   She is able to return to work on  2/2/2017      Weight Bearing Status: Full Weight-Bearing           Signatures   Electronically signed by : ALESSIA Valles; Jan 31 2017  4:53PM EST                       (Author)    Electronically signed by : Bharat Curran, [facility]; Feb 1 2017 11:33AM EST                       (Author)

## 2018-01-22 VITALS
TEMPERATURE: 97.4 F | WEIGHT: 175.25 LBS | HEIGHT: 60 IN | HEART RATE: 68 BPM | SYSTOLIC BLOOD PRESSURE: 144 MMHG | BODY MASS INDEX: 34.41 KG/M2 | RESPIRATION RATE: 18 BRPM | DIASTOLIC BLOOD PRESSURE: 88 MMHG

## 2018-01-22 VITALS — SYSTOLIC BLOOD PRESSURE: 134 MMHG | DIASTOLIC BLOOD PRESSURE: 82 MMHG

## 2018-01-22 VITALS
BODY MASS INDEX: 32.79 KG/M2 | WEIGHT: 167 LBS | HEART RATE: 72 BPM | RESPIRATION RATE: 16 BRPM | DIASTOLIC BLOOD PRESSURE: 80 MMHG | HEIGHT: 60 IN | SYSTOLIC BLOOD PRESSURE: 142 MMHG

## 2018-01-22 VITALS
WEIGHT: 170 LBS | HEIGHT: 60 IN | DIASTOLIC BLOOD PRESSURE: 92 MMHG | SYSTOLIC BLOOD PRESSURE: 161 MMHG | BODY MASS INDEX: 33.38 KG/M2

## 2018-01-23 NOTE — PROGRESS NOTES
History of Present Illness  ED Outreach:   ED Visit Information  ED visit date: 12/22/2017  Diagnosis description: DIZZINESS AND GIDDINESS   Facility name: [facility]   Discharge status: Home  Number of ED visits to date: 1  ED severity: 5  In network  Outreach Information  Outreach unsuccessful  Number of attempts - letter sent  Date letter mailed: 01/04/2018  Date finalized: 01/04/2018  Care Coordination  Emergent necessity warranted by diagnosis  [facility] PCP  Practice did not contact patient  No follow up appointment with PCP  Not seen for follow up out of network  Comments:   329pm 1/2/18 LM/ 832am 1/3/18 lm / 1240pm 1/4/18 rosanne Singh       Future Appointments    Date/Time Provider Specialty Site   04/13/2018 07:30 AM Mercedes Langford MD Family Medicine [facility] 91     Signatures   Electronically signed by : Kayli Esqueda, ; Jan 4 2018  1:46PM EST                       (Author)

## 2018-01-23 NOTE — MISCELLANEOUS
Message  Return to work or school:  12/8/17       She may return to her gym membership without restrictions  Signatures   Electronically signed by :  Barbara Sands MD; Dec  8 2017  8:48AM EST                       (Author)

## 2018-01-23 NOTE — RESULT NOTES
Verified Results  (1) CBC/PLT/DIFF [id number] 07:47PM [employer] Order Number: [id number]     Test Name Result Flag Reference   WBC COUNT 8 05 Thousand/uL  4 31-10 16   RBC COUNT 4 16 Million/uL  3 81-5 12   HEMOGLOBIN 12 6 g/dL  11 5-15 4   HEMATOCRIT 39 2 %  34 8-46  1   MCV 94 fL  82-98   MCH 30 3 pg  26 8-34 3   MCHC 32 1 g/dL  31 4-37 4   RDW 12 9 %  11 6-15 1   MPV 10 2 fL  8 9-12 7   PLATELET COUNT 402 Thousands/uL  149-390   nRBC AUTOMATED 0 /100 WBCs     NEUTROPHILS RELATIVE PERCENT 67 %  43-75   LYMPHOCYTES RELATIVE PERCENT 23 %  14-44   MONOCYTES RELATIVE PERCENT 8 %  4-12   EOSINOPHILS RELATIVE PERCENT 2 %  0-6   BASOPHILS RELATIVE PERCENT 0 %  0-1   NEUTROPHILS ABSOLUTE COUNT 5 37 Thousands/? ??L  1 85-7 62   LYMPHOCYTES ABSOLUTE COUNT 1 86 Thousands/? ??L  0 60-4 47   MONOCYTES ABSOLUTE COUNT 0 61 Thousand/? ??L  0 17-1 22   EOSINOPHILS ABSOLUTE COUNT 0 15 Thousand/? ??L  0 00-0 61   BASOPHILS ABSOLUTE COUNT 0 02 Thousands/? ??L  0 00-0 10     (1) FERRITIN [id number] 07:47PM [employer] Order Number: [id number]     Test Name Result Flag Reference   FERRITIN 35 ng/mL  8-388     (1) IRON [id number] 07:47PM [employer] Order Number: [id number]     Test Name Result Flag Reference   IRON 29 ug/dL L    Patients treated with metal-binding drugs (ie  Deferoxamine) may have depressed iron values

## 2018-01-24 NOTE — PROGRESS NOTES
Plan  Dysuria    · (1) URINE CULTURE; Source:Urine, Clean Catch; Status: In Progress - Specimen/Data  Collected,Retrospective By Protocol Authorization;   Done: 89VII2276  Perform:[address]; MAA:[id number]; Last Updated   Roseline Vasques; 3/9/2017 9:08:57 AM;Ordered; Philousmane Horse; Ordered By:Khalida Bailey; Health Maintenance    · (1) THIN PREP PAP WITH IMAGING; Status:Active; Requested HWK:[id number];   Perform:[facility]; NLI:[id number]; Ordered; For:Health Maintenance;   Ordered By:Khalida Bailey;  PAP Maturation index required? : No  Reflex HPV? : Regardless of Interpretation  Screen for STD (sexually transmitted disease)    · (1) CHLAMYDIA/GC AMPLIFIED DNA, PCR; Source:Endocervical; Status:Active; Requested ISZ:12BON2601;   Perform:[address]; JEREMÍAS:[id number]; Ordered; For:Screen for STD (sexually transmitted disease); Ordered By:Khalida Bailey;   · * MAMMO SCREENING BILATERAL W CAD; Status:Hold For - Scheduling; Requested  for:2017;   Perform:[facility]; VLX:[id number]; Ordered; For:Screen for STD (sexually   transmitted disease); Ordered By:Khalida Bailey; Discussion/Summary  health maintenance visit Currently, she eats a healthy diet and has an adequate exercise regimen  the risks and benefits of cervical cancer screening were discussed Pap test with reflex HPV testing was done today Testing was done today for chlamydia and gonorrhea  Breast cancer screening: the risks and benefits of breast cancer screening were discussed, monthly self breast exam was advised and mammogram has been ordered  Advice and education were given regarding nutrition and aerobic exercise  Patient discussion: discussed with the patient  Patient is a 54yo  sexually active hx of btl LMP 17 here for annual gyn visit  Menses regular  Urinary frequency & burning x1 week - sent for UA, Ucx  Udip negative in office    Collected pap w/ cotesting, GC/CT cultures today  Provided mammogram slip  RTC PRN  Chief Complaint  Patient is here for annual exam  Patient has burning urination and c/o of clots in menstration and lasting longer  History of Present Illness  HPI: Patient is a 54yo  sexually active hx of btl LMP 17 here for annual gyn visit  No complaints  Has not seen a gyn in several years  : Hx of dx lap - was told she has many adhesions, denies hx of STD - indication was pelvic pain - patient states has not had for several years  Menses qmonthly 4-7day duration, no intermenstrual spotting  Hx of btl, sexually active w/ same partner - last STD testing 3 years ago, requests GC/CT cultures  Reports urinary frequency increased over past week - denies urgency, burning, suprapubic discomfort  Reports working out 3x weekly, denies smoking   GYN , Adult Female [facility]: The patient is being seen for a gynecology evaluation  Social History: Household members include self  She is unmarried  The patient has never smoked cigarettes  She reports occasional alcohol use  She has never used illicit drugs  General Health: The patient's health since the last visit is described as good  She has regular dental visits  She denies vision problems  She denies hearing loss  Immunizations status: up to date  Lifestyle:  She consumes a diverse and healthy diet  She does not have any weight concerns  She exercises regularly  She does not use tobacco  She consumes alcohol  She denies drug use  Reproductive health:  she reports menstrual problems  Menstrual history: LMP: the last menstrual period was 17  Recent menstrual periods: bleeding has been normal  The cycles have been regular  The duration of her recent periods has been irregular  Menstrual Problems: clots and longer duration  she uses contraception  For contraception, she has had a tubal occlusion  she is sexually active  pregnancy history: G 2P 2, 1  Screening:      Review of Systems  burning sensation during urination and urinary frequency, but no pelvic pain, no vaginal pain, no vaginal discharge, no vaginal odor, no nonmenstrual bleeding, no dysuria, no hematuria, no feelings of urinary urgency, no flank pain and urine is not foul-smelling  Constitutional: no fever and no chills  Breasts: no breast pain, no breast swelling, no breast lump, no breast itching and no nipple discharge  Gastrointestinal: no abdominal pain, no nausea, no constipation and no diarrhea  Neurological: no headache and no dizziness  Active Problems   1  Abdominal pain (789 00) (R10 9)  2  Altered bowel habits (787 99) (R19 4)  3  Anxiety (300 00) (F41 9)  4  Fatigue (780 79) (R53 83)  5  Lower resp  tract infection (519 8) (J22)  6  Need for influenza vaccination (V04 81) (Z23)  7  Obesity (278 00) (E66 9)  8  Pelvic pain in female (625 9) (R10 2)  9  Premenstrual syndrome (625 4) (N94 3)  10  Shortness of breath (786 05) (R06 02)  11   Visit for screening mammogram (V76 12) (Z12 31)    Past Medical History    · History of Acute viral bronchitis (466 0) (J20 8)   · History of Anxiety (300 00) (F41 9)   · History of candidal vulvovaginitis (V13 29) (Z86 19)   · History of Pain In Flank (789 09)   · History of Trichomonal vulvovaginitis (131 01) (A59 01)    Surgical History    · History of Appendectomy   · History of Tubal Ligation    Family History  Mother    · Family history of Cancer   · Family history of hypertension (V17 49) (Z82 49)  Father    · Family history unknown (V49 89) (Z78 9)  Sister    · Family history unknown (V49 89) (Z78 9)  Maternal Grandmother    · Family history unknown (V49 89) (Z78 9)  Paternal Grandmother    · Family history unknown (V49 89) (Z78 9)  Maternal Grandfather    · Family history unknown (V49 89) (Z78 9)  Paternal Grandfather    · Family history unknown (V49 89) (Z78 9)    Social History    · Daily caffeine consumption, 4-5 servings a day   ·    · Full-time employment   · Never a smoker   · No drug use   · One child   · 1 grandson   · Social alcohol use (Z78 9)    Current Meds  1  Benzonatate 100 MG Oral Capsule; TAKE 1 CAPSULE 3 TIMES DAILY AS NEEDED; Therapy: 73YJI6713 to (Evaluate:09Jan2017)  Requested for: 30Dec2016; Last   Rx:30Dec2016 Ordered  2  Ventolin  (90 Base) MCG/ACT Inhalation Aerosol Solution; inhale 1 to 2 puffs   every 4 to 6 hours if needed; Therapy: 85MBH3501 to (Last Rx:30Jan2017)  Requested for: 30Jan2017 Ordered    Allergies   1  No Known Drug Allergies    Vitals   Recorded: 33MLV1094 98:38ID   Systolic 200   Diastolic 92   Height 5 ft    Weight 170 lb    BMI Calculated 33 2   BSA Calculated 1 74     Physical Exam    Constitutional   General appearance: No acute distress, well appearing and well nourished  Neck   Neck: Normal, supple, trachea midline, no masses  Pulmonary   Respiratory effort: No increased work of breathing or signs of respiratory distress  Auscultation of lungs: Clear to auscultation  Cardiovascular   Auscultation of heart: Normal rate and rhythm, normal S1 and S2, no murmurs  Genitourinary   External genitalia: Normal and no lesions appreciated  Vagina: Normal, no lesions or dryness appreciated  Urethra: Normal     Cervix: Normal, no palpable masses  Uterus: Normal, non-tender, not enlarged, and no palpable masses  Adnexa/parametria: Normal, non-tender and no fullness or masses appreciated  Chest   Breasts: Normal and no dimpling or skin changes noted  Abdomen   Abdomen: Normal, non-tender, and no organomegaly noted  Psychiatric   Orientation to person, place, and time: Normal     Mood and affect: Normal        Attending Note    Collaborating Physician Note: 1  1   1    Attending Note: Attending Note:1  I discussed the case with the Resident and reviewed the Resident's note1  and I agree with the Resident management plan as it was presented to [facility]    I agree with the Resident's note1        1 Amended By: Leo Garber;  Mar 09 2017 10:12 AM EST    Signatures   Electronically signed by : BERTIN Huffman ; Mar  9 2017  9:54AM EST                       (Author)    Electronically signed by : Sundar Reveles MD; Mar  9 2017 10:13AM EST                       (Author)

## 2018-02-09 ENCOUNTER — OFFICE VISIT (OUTPATIENT)
Dept: FAMILY MEDICINE CLINIC | Facility: CLINIC | Age: 48
End: 2018-02-09
Payer: COMMERCIAL

## 2018-02-09 VITALS
BODY MASS INDEX: 30.96 KG/M2 | SYSTOLIC BLOOD PRESSURE: 126 MMHG | HEIGHT: 61 IN | TEMPERATURE: 97.8 F | HEART RATE: 78 BPM | DIASTOLIC BLOOD PRESSURE: 94 MMHG | RESPIRATION RATE: 16 BRPM | WEIGHT: 164 LBS

## 2018-02-09 DIAGNOSIS — H65.91 RIGHT NON-SUPPURATIVE OTITIS MEDIA: Primary | ICD-10-CM

## 2018-02-09 DIAGNOSIS — N93.8 DYSFUNCTIONAL UTERINE BLEEDING: ICD-10-CM

## 2018-02-09 PROBLEM — I10 HYPERTENSION, ESSENTIAL: Status: ACTIVE | Noted: 2017-07-17

## 2018-02-09 PROCEDURE — 99213 OFFICE O/P EST LOW 20 MIN: CPT | Performed by: FAMILY MEDICINE

## 2018-02-09 RX ORDER — PNV NO.95/FERROUS FUM/FOLIC AC 28MG-0.8MG
TABLET ORAL
COMMUNITY
Start: 2017-12-22 | End: 2018-05-15 | Stop reason: SDUPTHER

## 2018-02-09 RX ORDER — AZITHROMYCIN 250 MG/1
250 TABLET, FILM COATED ORAL EVERY 24 HOURS
Qty: 6 TABLET | Refills: 0 | Status: SHIPPED | OUTPATIENT
Start: 2018-02-09 | End: 2018-02-14

## 2018-02-09 RX ORDER — AMLODIPINE BESYLATE 2.5 MG/1
1 TABLET ORAL DAILY
COMMUNITY
Start: 2017-07-17 | End: 2018-04-03

## 2018-02-09 NOTE — PROGRESS NOTES
Assessment/Plan:    Dysfunctional uterine bleeding  Repeat cbc in April       Diagnoses and all orders for this visit:    Right non-suppurative otitis media  -     azithromycin (ZITHROMAX) 250 mg tablet; Take 1 tablet (250 mg total) by mouth every 24 hours for 5 days 2 1st day, 1/d for 4 days    Dysfunctional uterine bleeding    Other orders  -     Ferrous Sulfate (IRON) 325 (65 Fe) MG TABS; Take by mouth  -     amLODIPine (NORVASC) 2 5 mg tablet; Take 1 tablet by mouth daily          Vitals:    02/09/18 0924   BP: 126/94   Pulse: 78   Resp: 16   Temp: 97 8 °F (36 6 °C)       Subjective:      Patient ID: Linnea Espino is a 56 y o  female  Cough   This is a new problem  The current episode started 1 to 4 weeks ago  The problem has been gradually worsening  The problem occurs constantly  The cough is non-productive  Associated symptoms include headaches, nasal congestion, postnasal drip, rhinorrhea, a sore throat and shortness of breath  Pertinent negatives include no ear pain or fever  The symptoms are aggravated by cold air  She has tried nothing for the symptoms  Her past medical history is significant for environmental allergies  Chief Complaint   Patient presents with    Cough     Pt states persisting cough, SOB due to cough and back pain due to cough       The following portions of the patient's history were reviewed and updated as appropriate: allergies, current medications, past family history, past medical history, past social history, past surgical history and problem list     Review of Systems   Constitutional: Negative for fever  HENT: Positive for postnasal drip, rhinorrhea and sore throat  Negative for ear pain  Respiratory: Positive for cough and shortness of breath  Gastrointestinal: Negative for abdominal pain, diarrhea and nausea  Allergic/Immunologic: Positive for environmental allergies  Neurological: Positive for headaches  Hematological: Negative for adenopathy  Objective:     Physical Exam   Constitutional: She appears well-developed and well-nourished  No distress  HENT:   Right Ear: Tympanic membrane is erythematous  Left Ear: Tympanic membrane is not erythematous  Nose: Mucosal edema and rhinorrhea present  Right sinus exhibits no maxillary sinus tenderness and no frontal sinus tenderness  Left sinus exhibits no maxillary sinus tenderness and no frontal sinus tenderness  Mouth/Throat: No oropharyngeal exudate or posterior oropharyngeal edema  Cardiovascular: Normal rate, regular rhythm and normal heart sounds  Pulmonary/Chest: Breath sounds normal    Lymphadenopathy:     She has no cervical adenopathy

## 2018-03-06 ENCOUNTER — TELEPHONE (OUTPATIENT)
Dept: FAMILY MEDICINE CLINIC | Facility: CLINIC | Age: 48
End: 2018-03-06

## 2018-03-06 NOTE — TELEPHONE ENCOUNTER
Patient states her weight loss medication is not working since December  She states in the beginning it was working well, she lost 12 pounds but not anymore  She wants to know if she could stop taking it  Please advise?  She has an appt with you in [place]

## 2018-04-02 PROBLEM — N93.8 DYSFUNCTIONAL UTERINE BLEEDING: Status: RESOLVED | Noted: 2017-07-17 | Resolved: 2018-04-02

## 2018-04-02 PROBLEM — N93.9 ABNORMAL UTERINE BLEEDING (AUB): Status: ACTIVE | Noted: 2018-04-02

## 2018-04-03 ENCOUNTER — OFFICE VISIT (OUTPATIENT)
Dept: OBGYN CLINIC | Facility: HOSPITAL | Age: 48
End: 2018-04-03
Payer: COMMERCIAL

## 2018-04-03 ENCOUNTER — APPOINTMENT (OUTPATIENT)
Dept: LAB | Facility: HOSPITAL | Age: 48
End: 2018-04-03
Payer: COMMERCIAL

## 2018-04-03 VITALS
WEIGHT: 169 LBS | DIASTOLIC BLOOD PRESSURE: 88 MMHG | SYSTOLIC BLOOD PRESSURE: 173 MMHG | HEART RATE: 86 BPM | BODY MASS INDEX: 32.46 KG/M2

## 2018-04-03 DIAGNOSIS — N93.9 ABNORMAL UTERINE BLEEDING (AUB): ICD-10-CM

## 2018-04-03 DIAGNOSIS — N93.9 ABNORMAL UTERINE BLEEDING (AUB): Primary | ICD-10-CM

## 2018-04-03 LAB
FSH SERPL-ACNC: 7.6 MIU/ML
PROLACTIN SERPL-MCNC: 9.2 NG/ML
T4 FREE SERPL-MCNC: 0.93 NG/DL (ref 0.76–1.46)
TSH SERPL DL<=0.05 MIU/L-ACNC: 4.01 UIU/ML (ref 0.36–3.74)

## 2018-04-03 PROCEDURE — 84439 ASSAY OF FREE THYROXINE: CPT

## 2018-04-03 PROCEDURE — 84443 ASSAY THYROID STIM HORMONE: CPT

## 2018-04-03 PROCEDURE — 83001 ASSAY OF GONADOTROPIN (FSH): CPT

## 2018-04-03 PROCEDURE — 99213 OFFICE O/P EST LOW 20 MIN: CPT | Performed by: OBSTETRICS & GYNECOLOGY

## 2018-04-03 PROCEDURE — 36415 COLL VENOUS BLD VENIPUNCTURE: CPT

## 2018-04-03 PROCEDURE — 84146 ASSAY OF PROLACTIN: CPT

## 2018-04-03 NOTE — PROGRESS NOTES
Assessment:  52 yo  here with AUB    Plan  1  AUB: declined pelvic exam at this visit  F/U TSH, FSH  PRL and  pelvic US  She will follow up in 2 weeks for EMB and results   Management of her AUB will be based on the findings from her evaluation  Pertinent Hx for homornal management includes CHTN, age 53, no Hx VTE/migraine w/ aura  2  CHTN: elevated BP in clinic today  Continue your Norvasc  Please follow up with your PCP for management of your elevated BPs  Luis M Chapa is an 53 y o  woman, , LMP 3/30/18, s/p BTL,   who presents for irregular menses  Per patient, she usually gets monthly menses ( Q 28 days , lasting about 7 days each,  With moderate bleeding)  However , her menses has gotten irregular over the last 3-4 months , and she had her first episode of intermenstrual bleeding this march ( 3/7/18 for 7 day and then 3/31/18 and ongoing)  Per patient, her last period was also  heavier than normal  Today she is having mild vaginal bleeding/spotting  She had Dysmenorrhea:mild, occurring throughout menses  Current contraception: tubal ligation  History of infertility: no  History of abnormal Pap smear: no  No other complaints  Pertinent Hx: Non smoker, CHTN, no hx VTE or migraines with aura    Menstrual History:  OB History     No data available           Patient's last menstrual period was 2018  The following portions of the patient's history were reviewed and updated as appropriate: allergies, current medications, past family history, past medical history, past social history, past surgical history and problem list     Review of Systems  Pertinent items are noted in HPI  Objective    BP (!) 173/88 (BP Location: Left arm, Patient Position: Sitting, Cuff Size: Standard)   Pulse 86   Wt 76 7 kg (169 lb)   LMP 2018   BMI 32 46 kg/m²     Exam:  Pelvic declined pelvic exam today   She will follow up in 2 weeks for EMB /pelvic exam  UPT at that visit  General:   alert and oriented, in no acute distress   Heart:    Lungs:    Abdomen: soft, non-tender, without masses or organomegaly   Vulva:    Vagina:    Cervix:    Uterus:    Adnexa:          Wander Oscar   4/3/2018

## 2018-04-19 ENCOUNTER — HOSPITAL ENCOUNTER (OUTPATIENT)
Dept: RADIOLOGY | Facility: HOSPITAL | Age: 48
Discharge: HOME | End: 2018-04-19
Payer: COMMERCIAL

## 2018-04-19 ENCOUNTER — OFFICE VISIT (OUTPATIENT)
Dept: OBGYN CLINIC | Facility: HOSPITAL | Age: 48
End: 2018-04-19
Payer: COMMERCIAL

## 2018-04-19 ENCOUNTER — TRANSCRIBE ORDERS (OUTPATIENT)
Dept: RADIOLOGY | Facility: HOSPITAL | Age: 48
End: 2018-04-19

## 2018-04-19 VITALS
DIASTOLIC BLOOD PRESSURE: 89 MMHG | SYSTOLIC BLOOD PRESSURE: 146 MMHG | WEIGHT: 166.8 LBS | HEART RATE: 73 BPM | BODY MASS INDEX: 33.63 KG/M2 | HEIGHT: 59 IN

## 2018-04-19 DIAGNOSIS — Z00.00 HEALTH CARE MAINTENANCE: ICD-10-CM

## 2018-04-19 DIAGNOSIS — N93.9 ABNORMAL UTERINE BLEEDING (AUB): ICD-10-CM

## 2018-04-19 DIAGNOSIS — N85.8 MASS OF UTERUS: Primary | ICD-10-CM

## 2018-04-19 LAB — SL AMB POCT URINE HCG: NEGATIVE

## 2018-04-19 PROCEDURE — 76856 US EXAM PELVIC COMPLETE: CPT

## 2018-04-19 PROCEDURE — 76830 TRANSVAGINAL US NON-OB: CPT

## 2018-04-19 PROCEDURE — 81025 URINE PREGNANCY TEST: CPT | Performed by: OBSTETRICS & GYNECOLOGY

## 2018-04-19 PROCEDURE — 58100 BIOPSY OF UTERUS LINING: CPT | Performed by: OBSTETRICS & GYNECOLOGY

## 2018-04-19 PROCEDURE — 88305 TISSUE EXAM BY PATHOLOGIST: CPT | Performed by: PATHOLOGY

## 2018-04-19 NOTE — PROGRESS NOTES
Endometrial biopsy  Date/Time: 4/19/2018 6:23 PM  Performed by: Alejandra Dakins  Authorized by: Alejandra Dakins     Consent:     Consent obtained:  Written    Consent given by:  Patient    Procedural risks discussed:  Bleeding, damage to other organs, failure rate and infection (Uterine perforation)    Patient questions answered: yes      Patient agrees, verbalizes understanding, and wants to proceed: yes      Instructions and paperwork completed: yes    Universal protocol:     Patient states understanding of procedure being performed: yes      Relevant documents present and verified: yes      Test results available and properly labeled: yes      Imaging studies available: yes    Indication:     Indications:  Other disorder of menstruation and other abnormal bleeding from female genital tract    Procedure:     Procedure: endometrial biopsy with Pipelle      A bivalve speculum was placed in the vagina: yes      Cervix cleaned and prepped: yes      The cervix was dilated: no      Uterus sounded: yes      Uterus sound depth (cm):  13    Specimen collected: specimen collected and sent to pathology      Patient tolerated procedure well with no complications: yes    Findings:     Uterus size:  >14 weeks    Cervix: normal      Adnexa: normal

## 2018-04-19 NOTE — PROGRESS NOTES
Assessment/Plan:      Diagnoses and all orders for this visit:    Mass of uterus  -     Ambulatory referral to Gynecologic Oncology; Future  -     Tissue Exam, endometrial biopsy    Health care maintenance  -     POCT urine HCG    Abnormal uterine bleeding (AUB)  -endometrial biopsy        Reviewed laboratory results with patient  She is currently not in menopause  She does have subclinical hypothyroidism  Ultrasound pictures reviewed with the patient  Discussed with the patient with a normal endometrial stripe should look like and reviewed her images with her  Discussed with patient that there is a large mass in her endometrial cavity, and we would consider that neoplasm until ruled out  An endometrial biopsy was performed today  Patient tolerated it well  Discussed with patient to make an appointment with gyn Oncology next week  Discussed with patient that treatment options will vary depending on what the biopsy shows  Approximately 40 minutes spent with the patient, greater than 50% spent with counseling  Subjective:     Patient ID: Kaylie Hannah is a 53 y o  female  P2   She is here for follow-up of her results for abnormal uterine bleeding as well as her pelvic ultrasound  She was seen on April 3rd complaining of 3-4 month history of abnormal uterine bleeding with normal, regular menses prior to this  She was ordered a TSH, FSH prolactin and a pelvic ultrasound  She declined a pelvic exam at time of the last visit  She currently does complain sometimes a feeling discomfort with urination, feeling like she is not emptied her bladder completely  She attributed this to aging  Review of Systems   Constitutional: Negative  Negative for unexpected weight change  Respiratory: Negative  Cardiovascular: Negative  Gastrointestinal: Negative  Genitourinary: Positive for menstrual problem and vaginal bleeding           Objective:     Physical Exam   Constitutional: She is oriented to person, place, and time  No distress  HENT:   Head: Normocephalic and atraumatic  Pulmonary/Chest: Effort normal    Abdominal: Soft  She exhibits no distension  There is no tenderness  There is no rebound and no guarding  Genitourinary: Vagina normal  Pelvic exam was performed with patient supine  Uterus is enlarged  Uterus is not deviated, not fixed and not tender  Cervix exhibits no motion tenderness and no discharge  Genitourinary Comments: Multiparous appearing cervix  Large approximately 16 week uterus on exam   Mobile, nontender  Neurological: She is alert and oriented to person, place, and time  Vitals reviewed

## 2018-05-03 ENCOUNTER — OFFICE VISIT (OUTPATIENT)
Dept: GYNECOLOGIC ONCOLOGY | Facility: CLINIC | Age: 48
End: 2018-05-03
Payer: COMMERCIAL

## 2018-05-03 VITALS
HEART RATE: 70 BPM | DIASTOLIC BLOOD PRESSURE: 64 MMHG | BODY MASS INDEX: 32.59 KG/M2 | TEMPERATURE: 98.3 F | HEIGHT: 60 IN | WEIGHT: 166 LBS | RESPIRATION RATE: 14 BRPM | SYSTOLIC BLOOD PRESSURE: 120 MMHG

## 2018-05-03 DIAGNOSIS — N93.9 ABNORMAL UTERINE BLEEDING (AUB): ICD-10-CM

## 2018-05-03 DIAGNOSIS — Z12.39 BREAST CANCER SCREENING: ICD-10-CM

## 2018-05-03 DIAGNOSIS — N85.8 MASS OF UTERUS: Primary | ICD-10-CM

## 2018-05-03 PROCEDURE — 99244 OFF/OP CNSLTJ NEW/EST MOD 40: CPT | Performed by: OBSTETRICS & GYNECOLOGY

## 2018-05-03 RX ORDER — DIPHENOXYLATE HYDROCHLORIDE AND ATROPINE SULFATE 2.5; .025 MG/1; MG/1
1 TABLET ORAL DAILY
COMMUNITY
End: 2018-05-16

## 2018-05-03 RX ORDER — SODIUM CHLORIDE, SODIUM LACTATE, POTASSIUM CHLORIDE, CALCIUM CHLORIDE 600; 310; 30; 20 MG/100ML; MG/100ML; MG/100ML; MG/100ML
125 INJECTION, SOLUTION INTRAVENOUS CONTINUOUS
Status: CANCELLED | OUTPATIENT
Start: 2018-05-03

## 2018-05-03 NOTE — H&P
Assessment/Plan:    Problem List Items Addressed This Visit        Genitourinary    Abnormal uterine bleeding (AUB) - Primary       Other    Mass of uterus     -   I have personally reviewed images  Statistically speaking given patient's age, endometrial biopsy demonstrating secretory endometrium without atypia, hyperplasia or carcinoma as well as imaging characteristics, this likely represents an intracavitary uterine leiomyoma  However, differential diagnosis includes benign and malignant endometrial pathology  Given symptomatic nature of this lesion, I have recommended definitive surgical treatment  Patient agrees to proceed with possible robotic hysterectomy,  possible bilateral salpingectomy / salpingo-oophorectomy ( patient counseled today about pros and cons of ovarian preservation), possible staging and all other indicated procedures  She agrees and wants to proceed  She has good exercise tolerance  No additional cardiovascular workup is indicated  Will obtain basic laboratory testing including chest x-ray, EKG, laboratory screening, hemoglobin A1c as per surgical site infection reduction protocol  We anticipate proceeding in the near future  I discussed with patient indication, risks, benefits and alternatives of surgical exploration  We discussed possibility of bleeding requiring blood transfusion, life-threatening infections requiring additional procedures, injuries to surrounding organs such as bladder, ureters, gastrointestinal tract and or neurovascular structures  Additionally, we discussed general risks associated to stress of surgery such as venous thromboembolism, acute myocardial events and stroke  All questions answered to patient's satisfaction  She agrees and wants to proceed  Informed consent form signed              Relevant Orders    Case request operating room: HYSTERECTOMY LAPAROSCOPIC TOTAL ([address]) W/ ROBOTICS,    POSSIBLE BILATERAL SALPINGO-OOPHORECTOMY, STAGING AND ALL OTHER INDICATED PROCEDURES  (Completed)    Type and screen    CBC and Platelet    Comprehensive metabolic panel    HEMOGLOBIN A1C W/ EAG ESTIMATION    EKG 12 lead    XR chest pa & lateral    Breast cancer screening     -   Overdue for mammogram   Mammogram ordered today  CHIEF COMPLAINT:       Patient referred for evaluation and treatment due to newly diagnosed uterine mass, abnormal uterine bleeding  Patient ID: Marito Jackson is a 53 y o  female  HPI    45-year-old with 3 month history of worsening abnormal uterine bleeding  Reports bleeding is heavy with clots  Occasional cramping  Also reports  Mid pelvic pressure, constipation associated with iron use  No changes in bladder function  Occasional nausea  She was evaluated by primary care provider and Gynecology Clinic and ultrasound demonstrated the presence of an enlarged uterus with an intracavitary mass  Endometrial biopsy showed secretory endometrium, no hyperplasia or atypia  She was referred for evaluation and treatment recommendations  Review of Systems    As above, otherwise 12 point review of systems is unremarkable  Current Outpatient Prescriptions   Medication Sig Dispense Refill    amLODIPine (NORVASC) 2 5 mg tablet Take 1 tablet by mouth daily      Ferrous Sulfate (IRON) 325 (65 Fe) MG TABS Take by mouth      multivitamin (THERAGRAN) TABS Take 1 tablet by mouth daily       No current facility-administered medications for this visit          Allergies   Allergen Reactions    Barley Grass Hives       Past Medical History:   Diagnosis Date    Anemia     Anxiety     Depression     Hypertension        Past Surgical History:   Procedure Laterality Date    APPENDECTOMY      TUBAL LIGATION         OB History      Para Term  AB Living    2 2            SAB TAB Ectopic Multiple Live Births                       Family History   Problem Relation Age of Onset    Hypertension Mother     No Known Problems Father        The following portions of the patient's history were reviewed and updated as appropriate: allergies, current medications, past family history, past medical history, past social history, past surgical history and problem list       Objective:    Blood pressure 120/64, pulse 70, temperature 98 3 °F (36 8 °C), temperature source Oral, resp  rate 14, height 4' 11 5" (1 511 m), weight 75 3 kg (166 lb)  Body mass index is 32 97 kg/m²  Physical Exam   Constitutional: She is oriented to person, place, and time  She appears well-developed and well-nourished  HENT:   Head: Normocephalic and atraumatic  Eyes: No scleral icterus  Neck: Normal range of motion  Neck supple  No thyromegaly present  Cardiovascular: Normal rate, regular rhythm and normal heart sounds  No murmur heard  Pulmonary/Chest: Effort normal    Abdominal: Soft  She exhibits no distension and no mass  There is no rebound  Genitourinary:   Genitourinary Comments:   Normal external genitalia  No vulvar lesions  Vagina with scant whitish discharge  Cervix grossly normal   Uterus 12-14 week size, irregularly shaped, somewhat tender to palpation  No rectovaginal septum nodularity  Musculoskeletal: Normal range of motion  She exhibits no edema  Lymphadenopathy:     She has no cervical adenopathy  Neurological: She is alert and oriented to person, place, and time  Skin: Skin is warm and dry  No rash noted  Psychiatric: She has a normal mood and affect  Her behavior is normal    Vitals reviewed        Delroy Pelayo MD, [address] [address]  5/3/2018  10:41 AM

## 2018-05-03 NOTE — ASSESSMENT & PLAN NOTE
-   I have personally reviewed images  Statistically speaking given patient's age, endometrial biopsy demonstrating secretory endometrium without atypia, hyperplasia or carcinoma as well as imaging characteristics, this likely represents an intracavitary uterine leiomyoma  However, differential diagnosis includes benign and malignant endometrial pathology  Given symptomatic nature of this lesion, I have recommended definitive surgical treatment  Patient agrees to proceed with possible robotic hysterectomy,  possible bilateral salpingectomy / salpingo-oophorectomy ( patient counseled today about pros and cons of ovarian preservation), possible staging and all other indicated procedures  She agrees and wants to proceed  She has good exercise tolerance  No additional cardiovascular workup is indicated  Will obtain basic laboratory testing including chest x-ray, EKG, laboratory screening, hemoglobin A1c as per surgical site infection reduction protocol  We anticipate proceeding in the near future  I discussed with patient indication, risks, benefits and alternatives of surgical exploration  We discussed possibility of bleeding requiring blood transfusion, life-threatening infections requiring additional procedures, injuries to surrounding organs such as bladder, ureters, gastrointestinal tract and or neurovascular structures  Additionally, we discussed general risks associated to stress of surgery such as venous thromboembolism, acute myocardial events and stroke  All questions answered to patient's satisfaction  She agrees and wants to proceed  Informed consent form signed

## 2018-05-03 NOTE — PATIENT INSTRUCTIONS
-   Preoperative instructions as per surgical packet  -   Do not take any medications on the day of surgery

## 2018-05-03 NOTE — LETTER
May 3, 2018     [facility] [address]  [address] [id number]    Patient: Mari aPrk   YOB: 1970   Date of Visit: 5/3/2018       Dear Dr Maria A Wetzel: Thank you for referring Mari Park to me for evaluation  Below are my notes for this consultation  If you have questions, please do not hesitate to call me  I look forward to following your patient along with you           Sincerely,        Dorothy Sahu MD        CC: Jonathan Louis MD

## 2018-05-09 ENCOUNTER — HOSPITAL ENCOUNTER (OUTPATIENT)
Dept: RADIOLOGY | Facility: HOSPITAL | Age: 48
Discharge: HOME | End: 2018-05-09
Payer: COMMERCIAL

## 2018-05-09 ENCOUNTER — APPOINTMENT (OUTPATIENT)
Dept: LAB | Facility: HOSPITAL | Age: 48
End: 2018-05-09
Attending: OBSTETRICS & GYNECOLOGY
Payer: COMMERCIAL

## 2018-05-09 ENCOUNTER — HOSPITAL ENCOUNTER (OUTPATIENT)
Dept: RADIOLOGY | Facility: HOSPITAL | Age: 48
Discharge: HOME | End: 2018-05-09
Attending: OBSTETRICS & GYNECOLOGY
Payer: COMMERCIAL

## 2018-05-09 DIAGNOSIS — N85.8 MASS OF UTERUS: ICD-10-CM

## 2018-05-09 DIAGNOSIS — Z12.39 BREAST CANCER SCREENING: ICD-10-CM

## 2018-05-09 LAB
ABO GROUP BLD: NORMAL
ALBUMIN SERPL BCP-MCNC: 3.6 G/DL (ref 3.5–5)
ALP SERPL-CCNC: 58 U/L (ref 46–116)
ALT SERPL W P-5'-P-CCNC: 24 U/L (ref 12–78)
ANION GAP SERPL CALCULATED.3IONS-SCNC: 7 MMOL/L (ref 4–13)
AST SERPL W P-5'-P-CCNC: 15 U/L (ref 5–45)
ATRIAL RATE: 69 BPM
BILIRUB SERPL-MCNC: 0.29 MG/DL (ref 0.2–1)
BLD GP AB SCN SERPL QL: NEGATIVE
BUN SERPL-MCNC: 8 MG/DL (ref 5–25)
CALCIUM SERPL-MCNC: 8.6 MG/DL (ref 8.3–10.1)
CHLORIDE SERPL-SCNC: 105 MMOL/L (ref 100–108)
CO2 SERPL-SCNC: 25 MMOL/L (ref 21–32)
CREAT SERPL-MCNC: 0.56 MG/DL (ref 0.6–1.3)
ERYTHROCYTE [DISTWIDTH] IN BLOOD BY AUTOMATED COUNT: 12.7 % (ref 11.6–15.1)
EST. AVERAGE GLUCOSE BLD GHB EST-MCNC: 103 MG/DL
GFR SERPL CREATININE-BSD FRML MDRD: 111 ML/MIN/1.73SQ M
GLUCOSE SERPL-MCNC: 96 MG/DL (ref 65–140)
HBA1C MFR BLD: 5.2 % (ref 4.2–6.3)
HCT VFR BLD AUTO: 37.9 % (ref 34.8–46.1)
HGB BLD-MCNC: 12.3 G/DL (ref 11.5–15.4)
MCH RBC QN AUTO: 30.2 PG (ref 26.8–34.3)
MCHC RBC AUTO-ENTMCNC: 32.5 G/DL (ref 31.4–37.4)
MCV RBC AUTO: 93 FL (ref 82–98)
P AXIS: 26 DEGREES
PLATELET # BLD AUTO: 338 THOUSANDS/UL (ref 149–390)
PMV BLD AUTO: 10.1 FL (ref 8.9–12.7)
POTASSIUM SERPL-SCNC: 4.1 MMOL/L (ref 3.5–5.3)
PR INTERVAL: 164 MS
PROT SERPL-MCNC: 7.4 G/DL (ref 6.4–8.2)
QRS AXIS: 13 DEGREES
QRSD INTERVAL: 92 MS
QT INTERVAL: 402 MS
QTC INTERVAL: 430 MS
RBC # BLD AUTO: 4.07 MILLION/UL (ref 3.81–5.12)
RH BLD: POSITIVE
SODIUM SERPL-SCNC: 137 MMOL/L (ref 136–145)
SPECIMEN EXPIRATION DATE: NORMAL
T WAVE AXIS: -2 DEGREES
VENTRICULAR RATE: 69 BPM
WBC # BLD AUTO: 6.97 THOUSAND/UL (ref 4.31–10.16)

## 2018-05-09 PROCEDURE — 93005 ELECTROCARDIOGRAM TRACING: CPT

## 2018-05-09 PROCEDURE — 86900 BLOOD TYPING SEROLOGIC ABO: CPT

## 2018-05-09 PROCEDURE — 85027 COMPLETE CBC AUTOMATED: CPT

## 2018-05-09 PROCEDURE — 71046 X-RAY EXAM CHEST 2 VIEWS: CPT

## 2018-05-09 PROCEDURE — 77067 SCR MAMMO BI INCL CAD: CPT

## 2018-05-09 PROCEDURE — 93010 ELECTROCARDIOGRAM REPORT: CPT | Performed by: INTERNAL MEDICINE

## 2018-05-09 PROCEDURE — 80053 COMPREHEN METABOLIC PANEL: CPT

## 2018-05-09 PROCEDURE — 86850 RBC ANTIBODY SCREEN: CPT

## 2018-05-09 PROCEDURE — 83036 HEMOGLOBIN GLYCOSYLATED A1C: CPT

## 2018-05-09 PROCEDURE — 36415 COLL VENOUS BLD VENIPUNCTURE: CPT

## 2018-05-09 PROCEDURE — 86901 BLOOD TYPING SEROLOGIC RH(D): CPT

## 2018-05-12 ENCOUNTER — ANESTHESIA EVENT (OUTPATIENT)
Dept: PERIOP | Facility: HOSPITAL | Age: 48
End: 2018-05-12
Payer: COMMERCIAL

## 2018-05-13 DIAGNOSIS — I10 ESSENTIAL HYPERTENSION: Primary | ICD-10-CM

## 2018-05-13 RX ORDER — AMLODIPINE BESYLATE 2.5 MG/1
TABLET ORAL DAILY
Qty: 30 TABLET | Refills: 3 | Status: SHIPPED | OUTPATIENT
Start: 2018-05-13 | End: 2018-09-25 | Stop reason: SDUPTHER

## 2018-05-15 DIAGNOSIS — D64.9 ANEMIA, UNSPECIFIED TYPE: Primary | ICD-10-CM

## 2018-05-15 RX ORDER — FERROUS SULFATE 325(65) MG
TABLET ORAL
Qty: 30 TABLET | Refills: 2 | Status: SHIPPED | OUTPATIENT
Start: 2018-05-15 | End: 2018-12-10 | Stop reason: ALTCHOICE

## 2018-05-16 NOTE — PRE-PROCEDURE INSTRUCTIONS
Pre-Surgery Instructions:   Medication Instructions    amLODIPine (NORVASC) 2 5 mg tablet Instructed patient per Anesthesia Guidelines   ferrous sulfate 325 (65 Fe) mg tablet Instructed patient per Anesthesia Guidelines

## 2018-05-31 ENCOUNTER — HOSPITAL ENCOUNTER (OUTPATIENT)
Facility: HOSPITAL | Age: 48
Setting detail: OUTPATIENT SURGERY
Discharge: HOME | End: 2018-05-31
Attending: OBSTETRICS & GYNECOLOGY | Admitting: OBSTETRICS & GYNECOLOGY
Payer: COMMERCIAL

## 2018-05-31 ENCOUNTER — ANESTHESIA (OUTPATIENT)
Dept: PERIOP | Facility: HOSPITAL | Age: 48
End: 2018-05-31
Payer: COMMERCIAL

## 2018-05-31 VITALS
SYSTOLIC BLOOD PRESSURE: 124 MMHG | RESPIRATION RATE: 16 BRPM | DIASTOLIC BLOOD PRESSURE: 76 MMHG | TEMPERATURE: 99.2 F | HEART RATE: 92 BPM | OXYGEN SATURATION: 97 %

## 2018-05-31 DIAGNOSIS — N85.8 MASS OF UTERUS: Primary | ICD-10-CM

## 2018-05-31 PROBLEM — Z90.710 STATUS POST LAPAROSCOPIC HYSTERECTOMY: Status: ACTIVE | Noted: 2018-05-31

## 2018-05-31 LAB
ABO GROUP BLD: NORMAL
BLD GP AB SCN SERPL QL: NEGATIVE
GLUCOSE SERPL-MCNC: 156 MG/DL (ref 65–140)
RH BLD: POSITIVE
SPECIMEN EXPIRATION DATE: NORMAL

## 2018-05-31 PROCEDURE — 88307 TISSUE EXAM BY PATHOLOGIST: CPT | Performed by: PATHOLOGY

## 2018-05-31 PROCEDURE — 88304 TISSUE EXAM BY PATHOLOGIST: CPT | Performed by: PATHOLOGY

## 2018-05-31 PROCEDURE — 82948 REAGENT STRIP/BLOOD GLUCOSE: CPT

## 2018-05-31 PROCEDURE — 58573 TLH W/T/O UTERUS OVER 250 G: CPT | Performed by: OBSTETRICS & GYNECOLOGY

## 2018-05-31 PROCEDURE — 86850 RBC ANTIBODY SCREEN: CPT | Performed by: OBSTETRICS & GYNECOLOGY

## 2018-05-31 PROCEDURE — 86900 BLOOD TYPING SEROLOGIC ABO: CPT | Performed by: OBSTETRICS & GYNECOLOGY

## 2018-05-31 PROCEDURE — 86901 BLOOD TYPING SEROLOGIC RH(D): CPT | Performed by: OBSTETRICS & GYNECOLOGY

## 2018-05-31 RX ORDER — OXYCODONE HYDROCHLORIDE AND ACETAMINOPHEN 5; 325 MG/1; MG/1
1 TABLET ORAL EVERY 4 HOURS PRN
Qty: 30 TABLET | Refills: 0 | Status: SHIPPED | OUTPATIENT
Start: 2018-05-31 | End: 2018-06-10

## 2018-05-31 RX ORDER — ROCURONIUM BROMIDE 10 MG/ML
INJECTION, SOLUTION INTRAVENOUS AS NEEDED
Status: DISCONTINUED | OUTPATIENT
Start: 2018-05-31 | End: 2018-05-31 | Stop reason: SURG

## 2018-05-31 RX ORDER — MAGNESIUM HYDROXIDE 1200 MG/15ML
LIQUID ORAL AS NEEDED
Status: DISCONTINUED | OUTPATIENT
Start: 2018-05-31 | End: 2018-05-31 | Stop reason: HOSPADM

## 2018-05-31 RX ORDER — OXYCODONE HYDROCHLORIDE AND ACETAMINOPHEN 5; 325 MG/1; MG/1
1 TABLET ORAL EVERY 4 HOURS PRN
Status: DISCONTINUED | OUTPATIENT
Start: 2018-05-31 | End: 2018-05-31 | Stop reason: HOSPADM

## 2018-05-31 RX ORDER — FENTANYL CITRATE 50 UG/ML
INJECTION, SOLUTION INTRAMUSCULAR; INTRAVENOUS AS NEEDED
Status: DISCONTINUED | OUTPATIENT
Start: 2018-05-31 | End: 2018-05-31 | Stop reason: SURG

## 2018-05-31 RX ORDER — SODIUM CHLORIDE 9 MG/ML
INJECTION, SOLUTION INTRAVENOUS CONTINUOUS PRN
Status: DISCONTINUED | OUTPATIENT
Start: 2018-05-31 | End: 2018-05-31 | Stop reason: SURG

## 2018-05-31 RX ORDER — HYDROXYZINE HYDROCHLORIDE 25 MG/1
25 TABLET, FILM COATED ORAL EVERY 6 HOURS PRN
COMMUNITY
End: 2018-12-10 | Stop reason: SDUPTHER

## 2018-05-31 RX ORDER — SODIUM CHLORIDE, SODIUM LACTATE, POTASSIUM CHLORIDE, CALCIUM CHLORIDE 600; 310; 30; 20 MG/100ML; MG/100ML; MG/100ML; MG/100ML
125 INJECTION, SOLUTION INTRAVENOUS CONTINUOUS
Status: DISCONTINUED | OUTPATIENT
Start: 2018-05-31 | End: 2018-05-31 | Stop reason: HOSPADM

## 2018-05-31 RX ORDER — MIDAZOLAM HYDROCHLORIDE 1 MG/ML
INJECTION INTRAMUSCULAR; INTRAVENOUS AS NEEDED
Status: DISCONTINUED | OUTPATIENT
Start: 2018-05-31 | End: 2018-05-31 | Stop reason: SURG

## 2018-05-31 RX ORDER — ONDANSETRON 2 MG/ML
INJECTION INTRAMUSCULAR; INTRAVENOUS AS NEEDED
Status: DISCONTINUED | OUTPATIENT
Start: 2018-05-31 | End: 2018-05-31 | Stop reason: SURG

## 2018-05-31 RX ORDER — DOCUSATE SODIUM 100 MG/1
100 CAPSULE, LIQUID FILLED ORAL 2 TIMES DAILY
Status: DISCONTINUED | OUTPATIENT
Start: 2018-05-31 | End: 2018-05-31 | Stop reason: HOSPADM

## 2018-05-31 RX ORDER — LIDOCAINE HYDROCHLORIDE 10 MG/ML
INJECTION, SOLUTION INFILTRATION; PERINEURAL AS NEEDED
Status: DISCONTINUED | OUTPATIENT
Start: 2018-05-31 | End: 2018-05-31 | Stop reason: SURG

## 2018-05-31 RX ORDER — DEXMEDETOMIDINE HYDROCHLORIDE 100 UG/ML
INJECTION, SOLUTION INTRAVENOUS AS NEEDED
Status: DISCONTINUED | OUTPATIENT
Start: 2018-05-31 | End: 2018-05-31 | Stop reason: SURG

## 2018-05-31 RX ORDER — GLYCOPYRROLATE 0.2 MG/ML
INJECTION INTRAMUSCULAR; INTRAVENOUS AS NEEDED
Status: DISCONTINUED | OUTPATIENT
Start: 2018-05-31 | End: 2018-05-31 | Stop reason: SURG

## 2018-05-31 RX ORDER — IBUPROFEN 600 MG/1
600 TABLET ORAL EVERY 6 HOURS PRN
Status: DISCONTINUED | OUTPATIENT
Start: 2018-05-31 | End: 2018-05-31 | Stop reason: HOSPADM

## 2018-05-31 RX ORDER — SODIUM CHLORIDE, SODIUM LACTATE, POTASSIUM CHLORIDE, CALCIUM CHLORIDE 600; 310; 30; 20 MG/100ML; MG/100ML; MG/100ML; MG/100ML
100 INJECTION, SOLUTION INTRAVENOUS CONTINUOUS
Status: DISCONTINUED | OUTPATIENT
Start: 2018-05-31 | End: 2018-05-31 | Stop reason: HOSPADM

## 2018-05-31 RX ORDER — ONDANSETRON 2 MG/ML
4 INJECTION INTRAMUSCULAR; INTRAVENOUS EVERY 4 HOURS PRN
Status: DISCONTINUED | OUTPATIENT
Start: 2018-05-31 | End: 2018-05-31 | Stop reason: HOSPADM

## 2018-05-31 RX ORDER — BUPIVACAINE HYDROCHLORIDE 2.5 MG/ML
INJECTION, SOLUTION EPIDURAL; INFILTRATION; INTRACAUDAL AS NEEDED
Status: DISCONTINUED | OUTPATIENT
Start: 2018-05-31 | End: 2018-05-31 | Stop reason: HOSPADM

## 2018-05-31 RX ORDER — OXYCODONE HYDROCHLORIDE AND ACETAMINOPHEN 5; 325 MG/1; MG/1
2 TABLET ORAL EVERY 4 HOURS PRN
Status: DISCONTINUED | OUTPATIENT
Start: 2018-05-31 | End: 2018-05-31 | Stop reason: HOSPADM

## 2018-05-31 RX ORDER — PROPOFOL 10 MG/ML
INJECTION, EMULSION INTRAVENOUS AS NEEDED
Status: DISCONTINUED | OUTPATIENT
Start: 2018-05-31 | End: 2018-05-31 | Stop reason: SURG

## 2018-05-31 RX ORDER — METOCLOPRAMIDE HYDROCHLORIDE 5 MG/ML
10 INJECTION INTRAMUSCULAR; INTRAVENOUS ONCE AS NEEDED
Status: DISCONTINUED | OUTPATIENT
Start: 2018-05-31 | End: 2018-05-31 | Stop reason: HOSPADM

## 2018-05-31 RX ORDER — FENTANYL CITRATE/PF 50 MCG/ML
25 SYRINGE (ML) INJECTION
Status: COMPLETED | OUTPATIENT
Start: 2018-05-31 | End: 2018-05-31

## 2018-05-31 RX ORDER — KETOROLAC TROMETHAMINE 30 MG/ML
INJECTION, SOLUTION INTRAMUSCULAR; INTRAVENOUS AS NEEDED
Status: DISCONTINUED | OUTPATIENT
Start: 2018-05-31 | End: 2018-05-31 | Stop reason: SURG

## 2018-05-31 RX ORDER — PROMETHAZINE HYDROCHLORIDE 25 MG/ML
25 INJECTION, SOLUTION INTRAMUSCULAR; INTRAVENOUS ONCE AS NEEDED
Status: DISCONTINUED | OUTPATIENT
Start: 2018-05-31 | End: 2018-05-31 | Stop reason: HOSPADM

## 2018-05-31 RX ADMIN — ENOXAPARIN SODIUM 40 MG: 40 INJECTION SUBCUTANEOUS at 07:21

## 2018-05-31 RX ADMIN — MIDAZOLAM 2 MG: 1 INJECTION INTRAMUSCULAR; INTRAVENOUS at 08:09

## 2018-05-31 RX ADMIN — GLYCOPYRROLATE 0.6 MG: 0.2 INJECTION, SOLUTION INTRAMUSCULAR; INTRAVENOUS at 11:00

## 2018-05-31 RX ADMIN — LIDOCAINE HYDROCHLORIDE 50 MG: 10 INJECTION, SOLUTION INFILTRATION; PERINEURAL at 08:23

## 2018-05-31 RX ADMIN — PROPOFOL 200 MG: 10 INJECTION, EMULSION INTRAVENOUS at 08:23

## 2018-05-31 RX ADMIN — DEXMEDETOMIDINE HYDROCHLORIDE 10 MCG: 100 INJECTION, SOLUTION INTRAVENOUS at 11:12

## 2018-05-31 RX ADMIN — DEXMEDETOMIDINE HYDROCHLORIDE 16 MCG: 100 INJECTION, SOLUTION INTRAVENOUS at 09:22

## 2018-05-31 RX ADMIN — FENTANYL CITRATE 25 MCG: 50 INJECTION INTRAMUSCULAR; INTRAVENOUS at 12:14

## 2018-05-31 RX ADMIN — SODIUM CHLORIDE, SODIUM LACTATE, POTASSIUM CHLORIDE, AND CALCIUM CHLORIDE 125 ML/HR: .6; .31; .03; .02 INJECTION, SOLUTION INTRAVENOUS at 12:10

## 2018-05-31 RX ADMIN — KETOROLAC TROMETHAMINE 30 MG: 30 INJECTION, SOLUTION INTRAMUSCULAR at 10:56

## 2018-05-31 RX ADMIN — FENTANYL CITRATE 50 MCG: 50 INJECTION, SOLUTION INTRAMUSCULAR; INTRAVENOUS at 08:23

## 2018-05-31 RX ADMIN — ROCURONIUM BROMIDE 10 MG: 10 INJECTION INTRAVENOUS at 09:52

## 2018-05-31 RX ADMIN — FENTANYL CITRATE 25 MCG: 50 INJECTION INTRAMUSCULAR; INTRAVENOUS at 12:00

## 2018-05-31 RX ADMIN — SODIUM CHLORIDE: 0.9 INJECTION, SOLUTION INTRAVENOUS at 08:23

## 2018-05-31 RX ADMIN — ONDANSETRON 4 MG: 2 INJECTION INTRAMUSCULAR; INTRAVENOUS at 08:38

## 2018-05-31 RX ADMIN — HYDROMORPHONE HYDROCHLORIDE 0.5 MG: 1 INJECTION, SOLUTION INTRAMUSCULAR; INTRAVENOUS; SUBCUTANEOUS at 11:10

## 2018-05-31 RX ADMIN — SODIUM CHLORIDE, SODIUM LACTATE, POTASSIUM CHLORIDE, AND CALCIUM CHLORIDE: .6; .31; .03; .02 INJECTION, SOLUTION INTRAVENOUS at 07:11

## 2018-05-31 RX ADMIN — FENTANYL CITRATE 25 MCG: 50 INJECTION INTRAMUSCULAR; INTRAVENOUS at 12:05

## 2018-05-31 RX ADMIN — ROCURONIUM BROMIDE 50 MG: 10 INJECTION INTRAVENOUS at 08:23

## 2018-05-31 RX ADMIN — ROCURONIUM BROMIDE 30 MG: 10 INJECTION INTRAVENOUS at 09:03

## 2018-05-31 RX ADMIN — OXYCODONE HYDROCHLORIDE AND ACETAMINOPHEN 1 TABLET: 5; 325 TABLET ORAL at 13:08

## 2018-05-31 RX ADMIN — DEXAMETHASONE SODIUM PHOSPHATE 5 MG: 10 INJECTION INTRAMUSCULAR; INTRAVENOUS at 08:38

## 2018-05-31 RX ADMIN — FENTANYL CITRATE 25 MCG: 50 INJECTION INTRAMUSCULAR; INTRAVENOUS at 12:10

## 2018-05-31 RX ADMIN — NEOSTIGMINE METHYLSULFATE 4 MG: 1 INJECTION, SOLUTION INTRAMUSCULAR; INTRAVENOUS; SUBCUTANEOUS at 11:00

## 2018-05-31 RX ADMIN — CEFAZOLIN SODIUM 1000 MG: 1 SOLUTION INTRAVENOUS at 08:30

## 2018-05-31 RX ADMIN — ONDANSETRON 4 MG: 2 INJECTION INTRAMUSCULAR; INTRAVENOUS at 10:56

## 2018-05-31 NOTE — ANESTHESIA PREPROCEDURE EVALUATION
Review of Systems/Medical History  Patient summary reviewed  Chart reviewed  No history of anesthetic complications     Cardiovascular  EKG reviewed, Exercise tolerance (METS): >4,  Hypertension controlled,   Comment: Negative stress test 2015,  Pulmonary  Negative pulmonary ROS        GI/Hepatic  Negative GI/hepatic ROS          Negative  ROS        Endo/Other    Obesity    GYN       Hematology  Anemia iron deficiency anemia,     Musculoskeletal  Negative musculoskeletal ROS        Neurology  Negative neurology ROS      Psychology   Anxiety, Depression ,              Physical Exam    Airway    Mallampati score: II  TM Distance: >3 FB  Neck ROM: full     Dental       Cardiovascular  Cardiovascular exam normal    Pulmonary  Pulmonary exam normal     Other Findings        Anesthesia Plan  ASA Score- 2     Anesthesia Type- general with ASA Monitors  Additional Monitors:   Airway Plan: ETT  Plan Factors- Patient instructed to abstain from smoking on day of procedure  Patient did not smoke on day of surgery  Induction- intravenous  Postoperative Plan- Plan for postoperative opioid use  Planned trial extubation    Informed Consent- Anesthetic plan and risks discussed with patient

## 2018-05-31 NOTE — H&P (VIEW-ONLY)
Assessment/Plan:    Problem List Items Addressed This Visit        Genitourinary    Abnormal uterine bleeding (AUB) - Primary       Other    Mass of uterus     -   I have personally reviewed images  Statistically speaking given patient's age, endometrial biopsy demonstrating secretory endometrium without atypia, hyperplasia or carcinoma as well as imaging characteristics, this likely represents an intracavitary uterine leiomyoma  However, differential diagnosis includes benign and malignant endometrial pathology  Given symptomatic nature of this lesion, I have recommended definitive surgical treatment  Patient agrees to proceed with possible robotic hysterectomy,  possible bilateral salpingectomy / salpingo-oophorectomy ( patient counseled today about pros and cons of ovarian preservation), possible staging and all other indicated procedures  She agrees and wants to proceed  She has good exercise tolerance  No additional cardiovascular workup is indicated  Will obtain basic laboratory testing including chest x-ray, EKG, laboratory screening, hemoglobin A1c as per surgical site infection reduction protocol  We anticipate proceeding in the near future  I discussed with patient indication, risks, benefits and alternatives of surgical exploration  We discussed possibility of bleeding requiring blood transfusion, life-threatening infections requiring additional procedures, injuries to surrounding organs such as bladder, ureters, gastrointestinal tract and or neurovascular structures  Additionally, we discussed general risks associated to stress of surgery such as venous thromboembolism, acute myocardial events and stroke  All questions answered to patient's satisfaction  She agrees and wants to proceed  Informed consent form signed              Relevant Orders    Case request operating room: HYSTERECTOMY LAPAROSCOPIC TOTAL ([address]) W/ ROBOTICS,    POSSIBLE BILATERAL SALPINGO-OOPHORECTOMY, STAGING AND ALL OTHER INDICATED PROCEDURES  (Completed)    Type and screen    CBC and Platelet    Comprehensive metabolic panel    HEMOGLOBIN A1C W/ EAG ESTIMATION    EKG 12 lead    XR chest pa & lateral    Breast cancer screening     -   Overdue for mammogram   Mammogram ordered today  CHIEF COMPLAINT:       Patient referred for evaluation and treatment due to newly diagnosed uterine mass, abnormal uterine bleeding  Patient ID: Javier Henderson is a 53 y o  female  HPI    45-year-old with 3 month history of worsening abnormal uterine bleeding  Reports bleeding is heavy with clots  Occasional cramping  Also reports  Mid pelvic pressure, constipation associated with iron use  No changes in bladder function  Occasional nausea  She was evaluated by primary care provider and Gynecology Clinic and ultrasound demonstrated the presence of an enlarged uterus with an intracavitary mass  Endometrial biopsy showed secretory endometrium, no hyperplasia or atypia  She was referred for evaluation and treatment recommendations  Review of Systems    As above, otherwise 12 point review of systems is unremarkable  Current Outpatient Prescriptions   Medication Sig Dispense Refill    amLODIPine (NORVASC) 2 5 mg tablet Take 1 tablet by mouth daily      Ferrous Sulfate (IRON) 325 (65 Fe) MG TABS Take by mouth      multivitamin (THERAGRAN) TABS Take 1 tablet by mouth daily       No current facility-administered medications for this visit          Allergies   Allergen Reactions    Barley Grass Hives       Past Medical History:   Diagnosis Date    Anemia     Anxiety     Depression     Hypertension        Past Surgical History:   Procedure Laterality Date    APPENDECTOMY      TUBAL LIGATION         OB History      Para Term  AB Living    2 2            SAB TAB Ectopic Multiple Live Births                       Family History   Problem Relation Age of Onset    Hypertension Mother     No Known Problems Father        The following portions of the patient's history were reviewed and updated as appropriate: allergies, current medications, past family history, past medical history, past social history, past surgical history and problem list       Objective:    Blood pressure 120/64, pulse 70, temperature 98 3 °F (36 8 °C), temperature source Oral, resp  rate 14, height 4' 11 5" (1 511 m), weight 75 3 kg (166 lb)  Body mass index is 32 97 kg/m²  Physical Exam   Constitutional: She is oriented to person, place, and time  She appears well-developed and well-nourished  HENT:   Head: Normocephalic and atraumatic  Eyes: No scleral icterus  Neck: Normal range of motion  Neck supple  No thyromegaly present  Cardiovascular: Normal rate, regular rhythm and normal heart sounds  No murmur heard  Pulmonary/Chest: Effort normal    Abdominal: Soft  She exhibits no distension and no mass  There is no rebound  Genitourinary:   Genitourinary Comments:   Normal external genitalia  No vulvar lesions  Vagina with scant whitish discharge  Cervix grossly normal   Uterus 12-14 week size, irregularly shaped, somewhat tender to palpation  No rectovaginal septum nodularity  Musculoskeletal: Normal range of motion  She exhibits no edema  Lymphadenopathy:     She has no cervical adenopathy  Neurological: She is alert and oriented to person, place, and time  Skin: Skin is warm and dry  No rash noted  Psychiatric: She has a normal mood and affect  Her behavior is normal    Vitals reviewed        Guzman Soto MD, [facility]  5/3/2018  10:41 AM

## 2018-05-31 NOTE — DISCHARGE INSTRUCTIONS
You will have pain post-operatively while you recover  You have been provided Percocet for moderate to severe pain  Please take them as instructed and as needed for pain  You may also take over the counter ibuprofen 600mg every 6 hours as needed for mild pain  Nothing in the vagina for 6-8 weeks until cleared by your physician  This includes no intercourse or sexual activity, no tampons, no tub baths, and no swimming during this period  Do not carry anything that requires any strain, typically nothing heavier than a gallon of a milk for 1-2 weeks  No driving while requiring pain meds  You may have light spotting, but any heavy bleeding requiring 1 pad/hour is not normal   You have multiple small incisions on your abdomen  Daily soap and water will suffice for cleaning  Please monitor signs of infection like redness, pain, white discharge, bleeding from incision sites  Please call your surgeon for any worsening pain, fevers, nausea/vomiting, or signs of infection  Thank you  Robot Assisted Laparoscopic Hysterectomy   WHAT YOU SHOULD KNOW:   Robot-assisted laparoscopic hysterectomy (RH) is surgery to remove your uterus and cervix using a machine controlled by your surgeon  Your ovaries, fallopian tubes, supporting tissues, some lymph nodes, and the top of your vagina may also be removed  After RH, you will not be able to become pregnant  You will go through menopause if your ovaries are removed  AFTER YOU LEAVE:   Medicines:  · Medicines  may be given to decrease pain or prevent a bacterial infection  You may also need to take hormone medicine such as estrogen  Ask your healthcare provider how to take this medicine safely  · Take your medicine as directed  Call your healthcare provider if you think your medicine is not helping or if you have side effects  Tell him if you are allergic to any medicine  Keep a list of the medicines, vitamins, and herbs you take   Include the amounts, and when and why you take them  Bring the list or the pill bottles to follow-up visits  Carry your medicine list with you in case of an emergency  Activity guidelines:   · You may feel like resting more after surgery  Slowly start to do more each day  Rest when you feel it is needed  · Ask when you can start having sexual intercourse again  What to expect after surgery: It is normal to bleed from your vagina after your uterus and cervix are removed  Change the sanitary pad often to prevent infection  Ask your gynecologist or healthcare provider how much bleeding to expect  Contact your healthcare provider if:   · You have a fever  · Your pain is getting worse, even after you take medicine  · Your incisions look red and swollen, or they have bad-smelling drainage coming from them  · You see new or an increased amount of bright red blood coming from your vagina or your incisions  · You have yellow, green, or bad-smelling discharge coming from your vagina  · You feel pain when you urinate or you need to urinate more often than usual     · You have trouble having a bowel movement  · Your skin is itchy, swollen, or has a rash  · You have questions or concerns about your condition or care  Seek care immediately or call 911 if:   · You feel lightheaded, short of breath, and have chest pain  · You cough up blood  · Your arm or leg feels warm, tender, and painful  It may look swollen and red  · You have more bleeding from your vagina than you were told to expect  © 2014 [address] [employer] is for End User's use only and may not be sold, redistributed or otherwise used for commercial purposes  All illustrations and images included in CareNotes® are the copyrighted property of [facility] , [employer]  or [employer]  The above information is an  only  It is not intended as medical advice for individual conditions or treatments   Talk to your doctor, nurse or pharmacist before following any medical regimen to see if it is safe and effective for you

## 2018-05-31 NOTE — OP NOTE
OPERATIVE REPORT  PATIENT NAME: Marc Jain    :  1970  MRN: [id number]  Pt Location: [facility] OR ROOM 14    SURGERY DATE: 2018    Surgeon(s) and Role:     * Marco A Garcia MD - Primary     * Jose Watts MD - Assisting     * Sari Whitt MD - Assisting     * Adam Sorto PA-C - Assisting    Preop Diagnosis:     * Mass of uterus [N85 9]    Post-Op Diagnosis Codes:     * Mass of uterus [N85 9]    Procedure(s) (LRB):  ROBOTIC TOTAL LAPAROSCOPIC HYSTERECTOMY,    BILATERAL SALPINGECTOMY AND INTRA-OP CYSTO (N/A)    Specimen(s):  ID Type Source Tests Collected by Time Destination   1 : bilateral fallopian tubes Tissue Fallopian Tubes, Bilateral TISSUE EXAM Marco A Garcia MD 2018 0914    2 : uterus and cervix Tissue Uterus TISSUE EXAM Marco A Garcia MD 2018 1028        Estimated Blood Loss:   50 mL    Drains:  [REMOVED] Urethral Catheter Non-latex 16 Fr  (Removed)   Number of days: 0       Anesthesia Type:   General    Operative Indications:  Patient with abnormal uterine bleeding and ultrasound evidence of a uterine mass  Office endometrial biopsy demonstrated no evidence of hyperplasia malignancy  After counseling regarding indications, risks, benefits and alternatives of surgical exploration, she consented to proceed  Operative Findings:  1  Approximately 16 week irregularly shaped uterus with multiple leiomyomata  Endometrial cavity appeared grossly normal   2   Grossly normal bilateral fallopian tubes and ovaries  The left ovary contained a functional approximately 1 5 cm cyst   3   No evidence of extra uterine disease  4   Cystoscopy demonstrated normal bladder mucosa and bilateral potent ureteral urine jets  Complications:   None    Procedure and Technique:  The patient was taken to the operating room where general endotracheal anesthesia was induced without complications  The patient received antibiotic prophylaxis per hospital protocol  Sequential compression devices were applied to the lower extremities and activated prior to induction of anesthesia  A single dose of preoperative Lovenox was administered  The patient was placed in the dorsolithotomy position in [address] [place] [address] and her lower abdomen, perineum and vagina were prepped and draped in the usual sterile fashion  Under direct visualization the uterus was sounded to approximately 15 cm  The endocervix was dilated  A  JEFF uterine manipulator was secured in place with a stitch of 0 Vicryl  Dominguez catheter was placed and the intravaginal occluder balloon was inflated  Attention was turned to the abdomen  All port sites were infiltrated with bupivacaine at the beginning of completion of the procedure  An 8 mm skin incision was made approximately 4 cm above the umbilicus near the midline  Then, using a 5 mm Endopath Excel trocar and the 5 mm laparoscope, the peritoneal cavity was entered under directvisualization  Good intraperitoneal location was confirmed and pneumoperitoneum was created to maximal pressure 15 mm of mercury  Three 8 mm robotic trocars were placed (2 on the left and 1 on the right) and another 8 mm trocar was placed in the midline port site for camera use  The 5 mm trocar was reinserted in the right flank for assistant's use  The patient was placed in steep Trendelenburg and the RemitDATAinci system was docked  The above-mentioned findings were noted  The fallopian tubes were elevated and the mesosalpinx was cauterized and divided  The fallopian tubes removed through 1 of the robotic trocars and sent for permanent  The ovarian ligaments were cauterized and divided  The ovaries were completely mobilized off of the cornual regions of the uterus  The round ligaments were cauterized and divided bilaterally and the bladder was mobilized anteriorly  The bladder was mobilized without difficulties  The uterine vessels were skeletonized cauterized and divided bilaterally   On the left side multiple aberrant feeding vessels to large uterine leiomyomata were identified cauterized and divided in a systematic fashion  The cardinal ligaments were serially cauterized and divided and a circumferential colpotomy was made  The bulky large specimen was placed in a large nylon bag  The bag was delivered through the vagina  Within the bag and without rupture or spillage the specimen was 1st bivalved and the endometrial and endocervical cavities were noted to be normal to inspection and palpation  Simple tiny asleep, progressive morcellation within the bag was carried out to allow complete delivery of the specimen through the vagina without difficulties  The vaginal cuff was closed using a running stitch of 2-0 PDO Stratafix  Airtight closure and excellent hemostasis was obtained  Copious irrigation was used and excellent hemostasis was confirmed at low intraperitoneal pressures  At this point the robot was undocked  Pneumoperitoneum was completely released with the assistance of Valsalva maneuvers  All trocars were removed and the skin was closed using a subcuticular stitch of 4-0 Monocryl and Histoacryl  The Dominguez was removed  Using the Nezhat the bladder was retrograde filled with approximately 150 mL of NS  We examined the bladder then using the 5 mm laparoscope  The above mentioned findings were noted  The Dominguez catheter was then used to drain the bladder and the catheter was then removed  The patient tolerated the procedure well  Sponge, lap, needle and instrument counts were reported as correct x2  The patient was successfully extubated in the operating room and transferred to the post anesthetic care unit in stable condition       I was present for the entire procedure    Patient Disposition:  PACU     SIGNATURE: Hermann Chapman MD, Felicie Merritt  DATE: May 31, 2018  TIME: 11:13 AM

## 2018-05-31 NOTE — PROGRESS NOTES
Assumed care of patient at 1440 hours  Report received from [facility] in [address]  Patient in supine position with HOB elevated to  60 degrees  In NAD

## 2018-05-31 NOTE — INTERVAL H&P NOTE
H&P reviewed  After examining the patient I find no changes in the patients condition since the H&P had been written  ALl questions answered  Pt wishes to proceed   She requests ovarian preservation if ovary/ies appear normal     Dorothy Sahu MD, [place], [facility]  5/31/2018  6:35 AM

## 2018-05-31 NOTE — ANESTHESIA POSTPROCEDURE EVALUATION
Post-Op Assessment Note      CV Status:  Stable    Mental Status:  Alert and awake    Hydration Status:  Euvolemic    PONV Controlled:  Controlled    Airway Patency:  Patent    Post Op Vitals Reviewed: Yes          Staff: CRNA       Comments: VSS SV NONOBSTRUCTED UNEVENTFUL          BP   87/52   Temp   99 8   Pulse  67   Resp 18   SpO2 97

## 2018-06-13 ENCOUNTER — OFFICE VISIT (OUTPATIENT)
Dept: GYNECOLOGIC ONCOLOGY | Facility: CLINIC | Age: 48
End: 2018-06-13

## 2018-06-13 VITALS
HEART RATE: 86 BPM | RESPIRATION RATE: 14 BRPM | HEIGHT: 60 IN | TEMPERATURE: 98.6 F | WEIGHT: 165 LBS | SYSTOLIC BLOOD PRESSURE: 122 MMHG | DIASTOLIC BLOOD PRESSURE: 72 MMHG | BODY MASS INDEX: 32.39 KG/M2

## 2018-06-13 DIAGNOSIS — Z90.710 STATUS POST LAPAROSCOPIC HYSTERECTOMY: ICD-10-CM

## 2018-06-13 DIAGNOSIS — Z09 POSTOP CHECK: Primary | ICD-10-CM

## 2018-06-13 PROCEDURE — 99024 POSTOP FOLLOW-UP VISIT: CPT | Performed by: NURSE PRACTITIONER

## 2018-06-13 NOTE — PROGRESS NOTES
Assessment/Plan:    Problem List Items Addressed This Visit     Status post laparoscopic hysterectomy    Postop check - Primary     This is a 60-year-old who is status-post the below-mentioned procedure on May 30, 2018  Pathology was without evidence of malignancy  She is recovering uneventfully, with the exception of mild abdominal pain  She is taking ibuprofen/Tylenol for discomfort  She plans to return to work on Monday provided pain is stable  Her performance status is zero  The patient will return to the office in 4 weeks for a vaginal cuff check  She was instructed to maintain strict pelvic rest during this time  She will call the office with any questions or concerns  CHIEF COMPLAINT: Postoperative evaluation       Problem:  Cancer Staging  No matching staging information was found for the patient  Previous therapy:  113/2018: 53year-old with 3 month history of worsening abnormal uterine bleeding  Reports bleeding is heavy with clots  She was evaluated by primary care provider and Gynecology Clinic and ultrasound demonstrated the presence of an enlarged uterus with an intracavitary mass  Endometrial biopsy showed secretory endometrium, no hyperplasia or atypia  5/31/18: ROBOTIC TOTAL LAPAROSCOPIC HYSTERECTOMY,  BILATERAL SALPINGECTOMY AND INTRA-OP CYSTO  Benign pathology  Patient ID: Mindy Ureña is a 53 y o  female     This is a 53 y o  female who presents to the office for post-operative evaluation  She is recovering uneventfully from surgery and is without acute complaints, with the exception of lingering abdominal pain  She states that she went to a festival over the weekend and did a lot of walking, and "paid for it" the next 2 days  She has been afebrile  She denies nausea or vomiting  Her appetite is appropriate  She reports normal bowel and bladder function  She denies vaginal bleeding or discharge  She is without abdominal or pelvic pain   She is ambulatory  The following portions of the patient's history were reviewed and updated as appropriate: allergies, current medications, past family history, past medical history, past social history, past surgical history and problem list     Review of Systems   Constitutional: Negative for chills, fatigue, fever and unexpected weight change  Respiratory: Negative for cough, chest tightness, shortness of breath and wheezing  Cardiovascular: Negative for chest pain, palpitations and leg swelling  Gastrointestinal: Positive for abdominal pain  Negative for diarrhea, nausea and vomiting  Genitourinary: Negative for difficulty urinating, dysuria, frequency, hematuria, pelvic pain, urgency, vaginal bleeding and vaginal discharge  Skin: Negative for color change, pallor and rash  Neurological: Negative for dizziness, weakness, light-headedness and numbness  Psychiatric/Behavioral: Negative  Current Outpatient Prescriptions:     amLODIPine (NORVASC) 2 5 mg tablet, TAKE 1 TABLET BY MOUTH DAILY  (Patient taking differently: Take 2 5 mg by mouth daily  ), Disp: 30 tablet, Rfl: 3    ferrous sulfate 325 (65 Fe) mg tablet, take 1 tablet by mouth once daily with food, Disp: 30 tablet, Rfl: 2    hydrOXYzine HCL (ATARAX) 25 mg tablet, Take 25 mg by mouth every 6 (six) hours as needed for anxiety, Disp: , Rfl:     Objective:    Blood pressure 122/72, pulse 86, temperature 98 6 °F (37 °C), temperature source Oral, resp  rate 14, height 4' 11 5" (1 511 m), weight 74 8 kg (165 lb)  Body mass index is 32 77 kg/m²  Body surface area is 1 71 meters squared  Physical Exam   Constitutional: She is oriented to person, place, and time  She appears well-developed and well-nourished  HENT:   Head: Normocephalic and atraumatic  Pulmonary/Chest: Effort normal  No respiratory distress  Abdominal: Soft  She exhibits no distension  There is no tenderness  Musculoskeletal: Normal range of motion  Neurological: She is alert and oriented to person, place, and time  She has normal reflexes  Skin: Skin is warm and dry  No rash noted  No erythema  No pallor  Psychiatric: She has a normal mood and affect  Her behavior is normal  Judgment and thought content normal           Pathology:    Final Diagnosis   A  Bilateral fallopian tubes (salpingectomy):     - Benign fallopian tubes  - No malignancy identified      B  Uterus and cervix (561 g hysterectomy):     - Uterus: Leiomyomata and proliferative endometrium       - Cervix: Mild cervicitis with Nabothian cyst formation      - No malignancy identified

## 2018-06-13 NOTE — PATIENT INSTRUCTIONS
1  Return in 4 weeks for cuff check  2  Strict pelvic rest   3  Call with any new complaints or concerns

## 2018-06-13 NOTE — ASSESSMENT & PLAN NOTE
This is a 71-year-old who is status-post the below-mentioned procedure on May 30, 2018  Pathology was without evidence of malignancy  She is recovering uneventfully, with the exception of mild abdominal pain  She is taking ibuprofen/Tylenol for discomfort  She plans to return to work on Monday provided pain is stable  Her performance status is zero  The patient will return to the office in 4 weeks for a vaginal cuff check  She was instructed to maintain strict pelvic rest during this time  She will call the office with any questions or concerns

## 2018-06-18 ENCOUNTER — APPOINTMENT (OUTPATIENT)
Dept: LAB | Facility: CLINIC | Age: 48
End: 2018-06-18
Payer: COMMERCIAL

## 2018-06-18 DIAGNOSIS — R30.0 DYSURIA: Primary | ICD-10-CM

## 2018-06-18 LAB
BACTERIA UR QL AUTO: ABNORMAL /HPF
BILIRUB UR QL STRIP: ABNORMAL
CLARITY UR: CLEAR
COLOR UR: ABNORMAL
GLUCOSE UR STRIP-MCNC: ABNORMAL MG/DL
HGB UR QL STRIP.AUTO: ABNORMAL
HYALINE CASTS #/AREA URNS LPF: ABNORMAL /LPF
KETONES UR STRIP-MCNC: ABNORMAL MG/DL
LEUKOCYTE ESTERASE UR QL STRIP: ABNORMAL
NITRITE UR QL STRIP: ABNORMAL
NON-SQ EPI CELLS URNS QL MICRO: ABNORMAL /HPF
PROT UR STRIP-MCNC: ABNORMAL MG/DL
RBC #/AREA URNS AUTO: ABNORMAL /HPF
SP GR UR STRIP.AUTO: 1.03 (ref 1–1.03)
UROBILINOGEN UR QL STRIP.AUTO: ABNORMAL E.U./DL
WBC #/AREA URNS AUTO: ABNORMAL /HPF

## 2018-06-18 PROCEDURE — 81001 URINALYSIS AUTO W/SCOPE: CPT | Performed by: NURSE PRACTITIONER

## 2018-06-18 RX ORDER — CIPROFLOXACIN 500 MG/1
500 TABLET, FILM COATED ORAL EVERY 12 HOURS SCHEDULED
Qty: 14 TABLET | Refills: 0 | Status: SHIPPED | OUTPATIENT
Start: 2018-06-18 | End: 2018-06-25

## 2018-06-18 RX ORDER — CIPROFLOXACIN 500 MG/1
500 TABLET, FILM COATED ORAL EVERY 12 HOURS SCHEDULED
Qty: 14 TABLET | Refills: 0 | Status: SHIPPED | OUTPATIENT
Start: 2018-06-18 | End: 2018-06-18 | Stop reason: CLARIF

## 2018-06-19 DIAGNOSIS — B37.3 VAGINAL CANDIDIASIS: Primary | ICD-10-CM

## 2018-06-19 RX ORDER — FLUCONAZOLE 150 MG/1
150 TABLET ORAL ONCE
Qty: 1 TABLET | Refills: 0 | Status: SHIPPED | OUTPATIENT
Start: 2018-06-19 | End: 2018-06-19

## 2018-06-25 DIAGNOSIS — R30.0 DYSURIA: Primary | ICD-10-CM

## 2018-06-26 ENCOUNTER — APPOINTMENT (OUTPATIENT)
Dept: LAB | Facility: CLINIC | Age: 48
End: 2018-06-26
Payer: COMMERCIAL

## 2018-06-26 DIAGNOSIS — N93.8 DYSFUNCTIONAL UTERINE BLEEDING: ICD-10-CM

## 2018-06-26 LAB
BASOPHILS # BLD AUTO: 0.05 THOUSANDS/ΜL (ref 0–0.1)
BASOPHILS NFR BLD AUTO: 1 % (ref 0–1)
BILIRUB UR QL STRIP: NEGATIVE
CLARITY UR: CLEAR
COLOR UR: YELLOW
EOSINOPHIL # BLD AUTO: 0.68 THOUSAND/ΜL (ref 0–0.61)
EOSINOPHIL NFR BLD AUTO: 8 % (ref 0–6)
ERYTHROCYTE [DISTWIDTH] IN BLOOD BY AUTOMATED COUNT: 12.5 % (ref 11.6–15.1)
GLUCOSE UR STRIP-MCNC: NEGATIVE MG/DL
HCT VFR BLD AUTO: 37.2 % (ref 34.8–46.1)
HGB BLD-MCNC: 11.6 G/DL (ref 11.5–15.4)
HGB UR QL STRIP.AUTO: NEGATIVE
IMM GRANULOCYTES # BLD AUTO: 0.02 THOUSAND/UL (ref 0–0.2)
IMM GRANULOCYTES NFR BLD AUTO: 0 % (ref 0–2)
KETONES UR STRIP-MCNC: NEGATIVE MG/DL
LEUKOCYTE ESTERASE UR QL STRIP: NEGATIVE
LYMPHOCYTES # BLD AUTO: 2.53 THOUSANDS/ΜL (ref 0.6–4.47)
LYMPHOCYTES NFR BLD AUTO: 31 % (ref 14–44)
MCH RBC QN AUTO: 29.4 PG (ref 26.8–34.3)
MCHC RBC AUTO-ENTMCNC: 31.2 G/DL (ref 31.4–37.4)
MCV RBC AUTO: 94 FL (ref 82–98)
MONOCYTES # BLD AUTO: 0.63 THOUSAND/ΜL (ref 0.17–1.22)
MONOCYTES NFR BLD AUTO: 8 % (ref 4–12)
NEUTROPHILS # BLD AUTO: 4.2 THOUSANDS/ΜL (ref 1.85–7.62)
NEUTS SEG NFR BLD AUTO: 52 % (ref 43–75)
NITRITE UR QL STRIP: NEGATIVE
NRBC BLD AUTO-RTO: 0 /100 WBCS
PH UR STRIP.AUTO: 6 [PH] (ref 4.5–8)
PLATELET # BLD AUTO: 415 THOUSANDS/UL (ref 149–390)
PMV BLD AUTO: 9.3 FL (ref 8.9–12.7)
PROT UR STRIP-MCNC: NEGATIVE MG/DL
RBC # BLD AUTO: 3.95 MILLION/UL (ref 3.81–5.12)
SP GR UR STRIP.AUTO: 1.02 (ref 1–1.03)
UROBILINOGEN UR QL STRIP.AUTO: 0.2 E.U./DL
WBC # BLD AUTO: 8.11 THOUSAND/UL (ref 4.31–10.16)

## 2018-06-26 PROCEDURE — 81003 URINALYSIS AUTO W/O SCOPE: CPT | Performed by: NURSE PRACTITIONER

## 2018-06-26 PROCEDURE — 36415 COLL VENOUS BLD VENIPUNCTURE: CPT

## 2018-06-26 PROCEDURE — 85025 COMPLETE CBC W/AUTO DIFF WBC: CPT

## 2018-06-27 ENCOUNTER — TELEPHONE (OUTPATIENT)
Dept: FAMILY MEDICINE CLINIC | Facility: CLINIC | Age: 48
End: 2018-06-27

## 2018-06-27 NOTE — TELEPHONE ENCOUNTER
----- Message from Donte Montero MD sent at 6/26/2018  6:34 PM EDT -----  [facility] nl

## 2018-07-09 ENCOUNTER — OFFICE VISIT (OUTPATIENT)
Dept: GYNECOLOGIC ONCOLOGY | Facility: CLINIC | Age: 48
End: 2018-07-09

## 2018-07-09 VITALS
SYSTOLIC BLOOD PRESSURE: 110 MMHG | RESPIRATION RATE: 14 BRPM | BODY MASS INDEX: 31.8 KG/M2 | TEMPERATURE: 97.9 F | WEIGHT: 162 LBS | DIASTOLIC BLOOD PRESSURE: 74 MMHG | HEIGHT: 60 IN | HEART RATE: 84 BPM

## 2018-07-09 DIAGNOSIS — Z09 POSTOP CHECK: Primary | ICD-10-CM

## 2018-07-09 PROCEDURE — 99024 POSTOP FOLLOW-UP VISIT: CPT | Performed by: NURSE PRACTITIONER

## 2018-07-09 NOTE — PROGRESS NOTES
Assessment/Plan:    Problem List Items Addressed This Visit     Postop check - Primary     This is a 50-year-old who is status-post the below-mentioned procedure on May 30, 2018  Pathology was without evidence of malignancy  She was treated empirically with Cipro 3 weeks ago for bladder spasms and dysuria  This has resolved  Her vaginal cuff is well-healed  Her performance status is zero      The patient will return to the office on a PRN basis  She will resume annual care with her primary gynecologist  She has been cleared to resume normal activity  She will call with any further symptoms or issues  CHIEF COMPLAINT: Postoperative evaluation       Problem:  Cancer Staging  No matching staging information was found for the patient  Previous therapy:  113/2018: 53year-old with 3 month history of worsening abnormal uterine bleeding  Reports bleeding is heavy with clots  She was evaluated by primary care provider and Gynecology Clinic and ultrasound demonstrated the presence of an enlarged uterus with an intracavitary mass  Endometrial biopsy showed secretory endometrium, no hyperplasia or atypia  5/31/18: ROBOTIC TOTAL LAPAROSCOPIC HYSTERECTOMY,  BILATERAL SALPINGECTOMY AND INTRA-OP CYSTO  Benign pathology  Patient ID: Guilherme Dennis is a 53 y o  female     This is a 53 y o  female who presents to the office for post-operative evaluation  She is recovering uneventfully from surgery and is without acute complaints  She has been afebrile  She denies nausea or vomiting  Her appetite is appropriate  She reports normal bowel and bladder function  After her first post-op visit, she developed bladder spasms and dysuria  She was treated empirically with Cipro and her symtpoms improved  She denies vaginal bleeding or discharge  She is without abdominal or pelvic pain  She is ambulatory            The following portions of the patient's history were reviewed and updated as appropriate: allergies, current medications, past family history, past medical history, past social history, past surgical history and problem list     Review of Systems   Constitutional: Negative for chills, fatigue, fever and unexpected weight change  Respiratory: Negative for cough, chest tightness, shortness of breath and wheezing  Cardiovascular: Negative for chest pain, palpitations and leg swelling  Gastrointestinal: Negative for abdominal pain, diarrhea, nausea and vomiting  Genitourinary: Negative for difficulty urinating, dysuria, frequency, hematuria, pelvic pain, urgency, vaginal bleeding and vaginal discharge  Skin: Negative for color change, pallor and rash  Neurological: Negative for dizziness, weakness, light-headedness and numbness  Psychiatric/Behavioral: Negative  Current Outpatient Prescriptions:     amLODIPine (NORVASC) 2 5 mg tablet, TAKE 1 TABLET BY MOUTH DAILY  (Patient taking differently: Take 2 5 mg by mouth daily  ), Disp: 30 tablet, Rfl: 3    hydrOXYzine HCL (ATARAX) 25 mg tablet, Take 25 mg by mouth every 6 (six) hours as needed for anxiety, Disp: , Rfl:     ferrous sulfate 325 (65 Fe) mg tablet, take 1 tablet by mouth once daily with food, Disp: 30 tablet, Rfl: 2    Objective:    Blood pressure 110/74, pulse 84, temperature 97 9 °F (36 6 °C), temperature source Oral, resp  rate 14, height 4' 11 5" (1 511 m), weight 73 5 kg (162 lb)  Body mass index is 32 17 kg/m²  Body surface area is 1 7 meters squared  Physical Exam   Constitutional: She is oriented to person, place, and time  She appears well-developed and well-nourished  HENT:   Head: Normocephalic and atraumatic  Pulmonary/Chest: Effort normal  No respiratory distress  Abdominal: Soft  She exhibits no distension and no mass  There is no tenderness  There is no guarding  Genitourinary: Vagina normal  No vaginal discharge found  Genitourinary Comments: Uterus, adnexa surgically absent   The vaginal cuff is well-healed  Musculoskeletal: Normal range of motion  Neurological: She is alert and oriented to person, place, and time  She has normal reflexes  Skin: Skin is warm and dry  No rash noted  No erythema  No pallor  Psychiatric: She has a normal mood and affect   Her behavior is normal  Judgment and thought content normal

## 2018-07-09 NOTE — PATIENT INSTRUCTIONS
1  Return to the office PRN  Call with concerns  2  Return to primary gynecologist for annual well-woman exams

## 2018-07-09 NOTE — ASSESSMENT & PLAN NOTE
This is a 46-year-old who is status-post the below-mentioned procedure on May 30, 2018  Pathology was without evidence of malignancy  She was treated empirically with Cipro 3 weeks ago for bladder spasms and dysuria  This has resolved  Her vaginal cuff is well-healed  Her performance status is zero      The patient will return to the office on a PRN basis  She will resume annual care with her primary gynecologist  She has been cleared to resume normal activity  She will call with any further symptoms or issues

## 2018-09-15 DIAGNOSIS — I10 ESSENTIAL HYPERTENSION: ICD-10-CM

## 2018-09-16 RX ORDER — AMLODIPINE BESYLATE 2.5 MG/1
TABLET ORAL
Qty: 30 TABLET | Refills: 3 | OUTPATIENT
Start: 2018-09-16

## 2018-09-25 DIAGNOSIS — I10 ESSENTIAL HYPERTENSION: ICD-10-CM

## 2018-09-26 RX ORDER — AMLODIPINE BESYLATE 2.5 MG/1
TABLET ORAL
Qty: 30 TABLET | Refills: 3 | Status: SHIPPED | OUTPATIENT
Start: 2018-09-26 | End: 2019-03-02 | Stop reason: SDUPTHER

## 2018-12-10 ENCOUNTER — OFFICE VISIT (OUTPATIENT)
Dept: FAMILY MEDICINE CLINIC | Facility: CLINIC | Age: 48
End: 2018-12-10
Payer: COMMERCIAL

## 2018-12-10 VITALS
DIASTOLIC BLOOD PRESSURE: 98 MMHG | TEMPERATURE: 97.9 F | WEIGHT: 163 LBS | HEIGHT: 60 IN | SYSTOLIC BLOOD PRESSURE: 140 MMHG | HEART RATE: 78 BPM | BODY MASS INDEX: 32 KG/M2

## 2018-12-10 DIAGNOSIS — F41.9 ANXIETY: Primary | ICD-10-CM

## 2018-12-10 PROCEDURE — 99213 OFFICE O/P EST LOW 20 MIN: CPT | Performed by: NURSE PRACTITIONER

## 2018-12-10 PROCEDURE — 1036F TOBACCO NON-USER: CPT | Performed by: NURSE PRACTITIONER

## 2018-12-10 PROCEDURE — 3008F BODY MASS INDEX DOCD: CPT | Performed by: NURSE PRACTITIONER

## 2018-12-10 RX ORDER — HYDROXYZINE HYDROCHLORIDE 25 MG/1
25 TABLET, FILM COATED ORAL EVERY 6 HOURS PRN
Qty: 30 TABLET | Refills: 0 | Status: SHIPPED | OUTPATIENT
Start: 2018-12-10 | End: 2019-09-17 | Stop reason: SDUPTHER

## 2018-12-10 RX ORDER — ESCITALOPRAM OXALATE 10 MG/1
5 TABLET ORAL DAILY
Qty: 30 TABLET | Refills: 0 | Status: SHIPPED | OUTPATIENT
Start: 2018-12-10 | End: 2019-03-02 | Stop reason: SDUPTHER

## 2018-12-10 NOTE — PROGRESS NOTES
Chief Complaint   Patient presents with    Anxiety     54 y/o female complains of anxiety and depression that has been getting worse overtime, was medicated for it in the past but lost insurance and couldn't afford it  Assessment/Plan:    Hypertension, essential  BP is doing well on amlodipine 2 5mg  Diagnoses and all orders for this visit:    Anxiety  -     escitalopram (LEXAPRO) 10 mg tablet; Take 0 5 tablets (5 mg total) by mouth daily  -     hydrOXYzine HCL (ATARAX) 25 mg tablet; Take 1 tablet (25 mg total) by mouth every 6 (six) hours as needed for anxiety          Subjective:      Patient ID: Solitario Winston is a 53 y o  female  Anxiety   Presents for follow-up visit  Symptoms include insomnia, nervous/anxious behavior and restlessness  Patient reports no chest pain, decreased concentration, irritability, palpitations or shortness of breath  Symptoms occur most days  The severity of symptoms is moderate  The quality of sleep is fair  The following portions of the patient's history were reviewed and updated as appropriate: allergies, current medications, past family history, past medical history, past social history, past surgical history and problem list     Review of Systems   Constitutional: Negative for fatigue and irritability  HENT: Negative  Eyes: Negative for visual disturbance  Respiratory: Negative for chest tightness and shortness of breath  Cardiovascular: Negative for chest pain and palpitations  Gastrointestinal: Negative  Neurological: Negative for headaches  Psychiatric/Behavioral: Positive for dysphoric mood  Negative for decreased concentration  The patient is nervous/anxious and has insomnia  Objective:      /98   Pulse 78   Temp 97 9 °F (36 6 °C)   Ht 4' 11 5" (1 511 m)   Wt 73 9 kg (163 lb)   BMI 32 37 kg/m²          Physical Exam   Constitutional: She is oriented to person, place, and time   She appears well-developed and well-nourished  HENT:   Head: Normocephalic and atraumatic  Neck: No JVD present  No thyromegaly present  Cardiovascular: Normal rate, regular rhythm and normal heart sounds  Pulmonary/Chest: Effort normal and breath sounds normal    Neurological: She is alert and oriented to person, place, and time  Psychiatric: She has a normal mood and affect  Thought content normal    Nursing note and vitals reviewed

## 2018-12-10 NOTE — LETTER
December 10, 2018     Patient: Emeka Patterson   YOB: 1970   Date of Visit: 12/10/2018       To Whom it May Concern: Emeka Patterson is under my professional care  She was seen in my office on 12/10/2018  She may return to work on 12/11/2018  If you have any questions or concerns, please don't hesitate to call           Sincerely,          STERLING Allison        CC: No Recipients

## 2019-03-02 DIAGNOSIS — F41.9 ANXIETY: ICD-10-CM

## 2019-03-02 DIAGNOSIS — I10 ESSENTIAL HYPERTENSION: ICD-10-CM

## 2019-03-02 RX ORDER — ESCITALOPRAM OXALATE 10 MG/1
TABLET ORAL
Qty: 30 TABLET | Refills: 0 | Status: SHIPPED | OUTPATIENT
Start: 2019-03-02 | End: 2019-09-17 | Stop reason: ALTCHOICE

## 2019-03-03 RX ORDER — AMLODIPINE BESYLATE 2.5 MG/1
TABLET ORAL
Qty: 30 TABLET | Refills: 3 | Status: SHIPPED | OUTPATIENT
Start: 2019-03-03 | End: 2019-09-17 | Stop reason: SDUPTHER

## 2019-09-17 ENCOUNTER — OFFICE VISIT (OUTPATIENT)
Dept: FAMILY MEDICINE CLINIC | Facility: CLINIC | Age: 49
End: 2019-09-17
Payer: COMMERCIAL

## 2019-09-17 VITALS
WEIGHT: 171.8 LBS | DIASTOLIC BLOOD PRESSURE: 88 MMHG | BODY MASS INDEX: 33.73 KG/M2 | SYSTOLIC BLOOD PRESSURE: 138 MMHG | HEIGHT: 60 IN | HEART RATE: 76 BPM

## 2019-09-17 DIAGNOSIS — I10 HYPERTENSION, ESSENTIAL: Primary | ICD-10-CM

## 2019-09-17 DIAGNOSIS — F41.9 ANXIETY: ICD-10-CM

## 2019-09-17 DIAGNOSIS — I10 ESSENTIAL HYPERTENSION: ICD-10-CM

## 2019-09-17 PROBLEM — N85.8 MASS OF UTERUS: Status: RESOLVED | Noted: 2018-04-19 | Resolved: 2019-09-17

## 2019-09-17 PROBLEM — Z09 POSTOP CHECK: Status: RESOLVED | Noted: 2018-06-13 | Resolved: 2019-09-17

## 2019-09-17 PROBLEM — N93.9 ABNORMAL UTERINE BLEEDING (AUB): Status: RESOLVED | Noted: 2018-04-02 | Resolved: 2019-09-17

## 2019-09-17 PROCEDURE — 3079F DIAST BP 80-89 MM HG: CPT | Performed by: FAMILY MEDICINE

## 2019-09-17 PROCEDURE — 3008F BODY MASS INDEX DOCD: CPT | Performed by: FAMILY MEDICINE

## 2019-09-17 PROCEDURE — 3075F SYST BP GE 130 - 139MM HG: CPT | Performed by: FAMILY MEDICINE

## 2019-09-17 PROCEDURE — 99214 OFFICE O/P EST MOD 30 MIN: CPT | Performed by: FAMILY MEDICINE

## 2019-09-17 RX ORDER — AMLODIPINE BESYLATE 2.5 MG/1
2.5 TABLET ORAL DAILY
Qty: 30 TABLET | Refills: 5 | Status: SHIPPED | OUTPATIENT
Start: 2019-09-17 | End: 2020-12-17 | Stop reason: CLARIF

## 2019-09-17 RX ORDER — HYDROXYZINE HYDROCHLORIDE 25 MG/1
25 TABLET, FILM COATED ORAL EVERY 6 HOURS PRN
Qty: 30 TABLET | Refills: 1 | Status: SHIPPED | OUTPATIENT
Start: 2019-09-17 | End: 2020-12-17 | Stop reason: CLARIF

## 2019-09-17 RX ORDER — MULTIVITAMIN
1 CAPSULE ORAL DAILY
Qty: 30 CAPSULE | Refills: 5 | Status: SHIPPED | OUTPATIENT
Start: 2019-09-17 | End: 2021-03-22

## 2019-09-17 NOTE — PROGRESS NOTES
Assessment and Plan:    Problem List Items Addressed This Visit        Cardiovascular and Mediastinum    Hypertension, essential - Primary     BP stable         Relevant Medications    amLODIPine (NORVASC) 2 5 mg tablet       Other    Anxiety    Relevant Medications    hydrOXYzine HCL (ATARAX) 25 mg tablet      Other Visit Diagnoses     Essential hypertension        Relevant Medications    amLODIPine (NORVASC) 2 5 mg tablet    Multiple Vitamin (MULTIVITAMIN) capsule    Other Relevant Orders    CBC and differential    Lipid panel    Comprehensive metabolic panel    TSH, 3rd generation    UA (URINE) with reflex to Microscopic    BMI 34 0-34 9,adult        Relevant Medications    Lorcaserin HCl ER (BELVIQ XR) 20 MG TB24                 Diagnoses and all orders for this visit:    Hypertension, essential    Essential hypertension  -     amLODIPine (NORVASC) 2 5 mg tablet; Take 1 tablet (2 5 mg total) by mouth daily  -     Multiple Vitamin (MULTIVITAMIN) capsule; Take 1 capsule by mouth daily  -     CBC and differential; Future  -     Lipid panel; Future  -     Comprehensive metabolic panel; Future  -     TSH, 3rd generation; Future  -     UA (URINE) with reflex to Microscopic    BMI 34 0-34 9,adult  -     Lorcaserin HCl ER (BELVIQ XR) 20 MG TB24; Take 1 tablet (20 mg total) by mouth daily    Anxiety  -     hydrOXYzine HCL (ATARAX) 25 mg tablet; Take 1 tablet (25 mg total) by mouth every 6 (six) hours as needed for anxiety              Subjective:      Patient ID: Thong Reyes is a 51 y o  female  CC:    Chief Complaint   Patient presents with    Follow-up     med check~cd       HPI:    Hypertension   This is a chronic problem  The current episode started more than 1 year ago  The problem is unchanged  The problem is controlled  Associated symptoms include anxiety  Pertinent negatives include no blurred vision, chest pain, headaches, peripheral edema or shortness of breath   There are no associated agents to hypertension  Risk factors for coronary artery disease include obesity and post-menopausal state  Past treatments include calcium channel blockers  The current treatment provides significant improvement  There are no compliance problems  The following portions of the patient's history were reviewed and updated as appropriate: allergies, current medications, past family history, past medical history, past social history, past surgical history and problem list       Review of Systems   Constitutional: Negative for activity change, appetite change and fatigue  Eyes: Negative for blurred vision  Respiratory: Negative for shortness of breath  Cardiovascular: Negative for chest pain  Neurological: Negative for headaches  Psychiatric/Behavioral: The patient is nervous/anxious  Data to review:       Objective:    Vitals:    09/17/19 1915   BP: 138/88   BP Location: Left arm   Patient Position: Sitting   Cuff Size: Large   Pulse: 76   Weight: 77 9 kg (171 lb 12 8 oz)   Height: 4' 11 5" (1 511 m)        Physical Exam   Constitutional: She appears well-developed and well-nourished  Neck: No thyromegaly present  Cardiovascular: Normal rate, regular rhythm and normal heart sounds  Pulmonary/Chest: Effort normal and breath sounds normal    Musculoskeletal: She exhibits no edema  Lymphadenopathy:     She has no cervical adenopathy  Vitals reviewed  BMI Counseling: Body mass index is 34 12 kg/m²  The BMI is above normal  Nutrition recommendations include reducing portion sizes, decreasing overall calorie intake, 3-5 servings of fruits/vegetables daily, reducing fast food intake and consuming healthier snacks  Exercise recommendations include exercising 3-5 times per week  BMI Counseling: Body mass index is 34 12 kg/m²   The BMI is above normal  Nutrition recommendations include reducing portion sizes, decreasing overall calorie intake, 3-5 servings of fruits/vegetables daily, reducing fast food intake and consuming healthier snacks  Exercise recommendations include exercising 3-5 times per week

## 2019-09-17 NOTE — PATIENT INSTRUCTIONS
Refill meds,await lab  Obesity   AMBULATORY CARE:   Obesity  is when your body mass index (BMI) is greater than 30  Your healthcare provider will use your height and weight to measure your BMI  The risks of obesity include  many health problems, such as injuries or physical disability  You may need tests to check for the following:  · Diabetes     · High blood pressure or high cholesterol     · Heart disease     · Gallbladder or liver disease     · Cancer of the colon, breast, prostate, liver, or kidney     · Sleep apnea     · Arthritis or gout  Seek care immediately if:   · You have a severe headache, confusion, or difficulty speaking  · You have weakness on one side of your body  · You have chest pain, sweating, or shortness of breath  Contact your healthcare provider if:   · You have symptoms of gallbladder or liver disease, such as pain in your upper abdomen  · You have knee or hip pain and discomfort while walking  · You have symptoms of diabetes, such as intense hunger and thirst, and frequent urination  · You have symptoms of sleep apnea, such as snoring or daytime sleepiness  · You have questions or concerns about your condition or care  Treatment for obesity  focuses on helping you lose weight to improve your health  Even a small decrease in BMI can reduce the risk for many health problems  Your healthcare provider will help you set a weight-loss goal   · Lifestyle changes  are the first step in treating obesity  These include making healthy food choices and getting regular physical activity  Your healthcare provider may suggest a weight-loss program that involves coaching, education, and therapy  · Medicine  may help you lose weight when it is used with a healthy diet and physical activity  · Surgery  can help you lose weight if you are very obese and have other health problems  There are several types of weight-loss surgery   Ask your healthcare provider for more information  Be successful losing weight:   · Set small, realistic goals  An example of a small goal is to walk for 20 minutes 5 days a week  Anther goal is to lose 5% of your body weight  · Tell friends, family members, and coworkers about your goals  and ask for their support  Ask a friend to lose weight with you, or join a weight-loss support group  · Identify foods or triggers that may cause you to overeat , and find ways to avoid them  Remove tempting high-calorie foods from your home and workplace  Place a bowl of fresh fruit on your kitchen counter  If stress causes you to eat, then find other ways to cope with stress  · Keep a diary to track what you eat and drink  Also write down how many minutes of physical activity you do each day  Weigh yourself once a week and record it in your diary  Eating changes: You will need to eat 500 to 1,000 fewer calories each day than you currently eat to lose 1 to 2 pounds a week  The following changes will help you cut calories:  · Eat smaller portions  Use small plates, no larger than 9 inches in diameter  Fill your plate half full of fruits and vegetables  Measure your food using measuring cups until you know what a serving size looks like  · Eat 3 meals and 1 or 2 snacks each day  Plan your meals in advance  Clora Andres and eat at home most of the time  Eat slowly  · Eat fruits and vegetables at every meal   They are low in calories and high in fiber, which makes you feel full  Do not add butter, margarine, or cream sauce to vegetables  Use herbs to season steamed vegetables  · Eat less fat and fewer fried foods  Eat more baked or grilled chicken and fish  These protein sources are lower in calories and fat than red meat  Limit fast food  Dress your salads with olive oil and vinegar instead of bottled dressing  · Limit the amount of sugar you eat  Do not drink sugary beverages  Limit alcohol    Activity changes:  Physical activity is good for your body in many ways  It helps you burn calories and build strong muscles  It decreases stress and depression, and improves your mood  It can also help you sleep better  Talk to your healthcare provider before you begin an exercise program   · Exercise for at least 30 minutes 5 days a week  Start slowly  Set aside time each day for physical activity that you enjoy and that is convenient for you  It is best to do both weight training and an activity that increases your heart rate, such as walking, bicycling, or swimming  · Find ways to be more active  Do yard work and housecleaning  Walk up the stairs instead of using elevators  Spend your leisure time going to events that require walking, such as outdoor festivals or fairs  This extra physical activity can help you lose weight and keep it off  Follow up with your healthcare provider as directed: You may need to meet with a dietitian  Write down your questions so you remember to ask them during your visits  © 2017 [address] Information is for End User's use only and may not be sold, redistributed or otherwise used for commercial purposes  All illustrations and images included in CareNotes® are the copyrighted property of [employer]  or Rich Chavez  The above information is an  only  It is not intended as medical advice for individual conditions or treatments  Talk to your doctor, nurse or pharmacist before following any medical regimen to see if it is safe and effective for you

## 2019-09-18 ENCOUNTER — TELEPHONE (OUTPATIENT)
Dept: FAMILY MEDICINE CLINIC | Facility: CLINIC | Age: 49
End: 2019-09-18

## 2019-09-18 NOTE — TELEPHONE ENCOUNTER
Patient called to state that pharmacy informed her that she will need auth for her Gideon Minors, she just wanted to make us aware to be on the lookout for it

## 2019-10-17 NOTE — TELEPHONE ENCOUNTER
Pt aware as per insurance the Rx for Belviq XR 20 mg has been ommitted from pts insurance, not able to do P  A

## 2020-01-02 ENCOUNTER — TELEPHONE (OUTPATIENT)
Dept: FAMILY MEDICINE CLINIC | Facility: CLINIC | Age: 50
End: 2020-01-02

## 2020-01-02 NOTE — TELEPHONE ENCOUNTER
PT CALLED REGARDING HER BELVIQ  SHE HAS NEW PRESCRIPTION COVERAGE NOW  PRESCRIPTION COVERAGE IS NOW  [employer]   ID [id number]  GROUP NUMBER [id number]  THIS IS ALSO HER INSURANCE CARD AS WELL  Dyan Clifford PLEASE RESUBMIT TO PROPER INS  THANK YOU

## 2020-01-22 ENCOUNTER — TELEPHONE (OUTPATIENT)
Dept: FAMILY MEDICINE CLINIC | Facility: CLINIC | Age: 50
End: 2020-01-22

## 2020-12-10 ENCOUNTER — HOSPITAL ENCOUNTER (EMERGENCY)
Facility: HOSPITAL | Age: 50
Discharge: HOME | End: 2020-12-10
Attending: EMERGENCY MEDICINE | Admitting: EMERGENCY MEDICINE
Payer: OTHER GOVERNMENT

## 2020-12-10 ENCOUNTER — APPOINTMENT (EMERGENCY)
Dept: CT IMAGING | Facility: HOSPITAL | Age: 50
End: 2020-12-10

## 2020-12-10 ENCOUNTER — APPOINTMENT (EMERGENCY)
Dept: RADIOLOGY | Facility: HOSPITAL | Age: 50
End: 2020-12-10

## 2020-12-10 VITALS
OXYGEN SATURATION: 99 % | TEMPERATURE: 98.8 F | RESPIRATION RATE: 18 BRPM | DIASTOLIC BLOOD PRESSURE: 67 MMHG | HEART RATE: 86 BPM | SYSTOLIC BLOOD PRESSURE: 140 MMHG

## 2020-12-10 DIAGNOSIS — B34.9 VIRAL SYNDROME: ICD-10-CM

## 2020-12-10 DIAGNOSIS — E87.6 HYPOKALEMIA: ICD-10-CM

## 2020-12-10 DIAGNOSIS — R19.7 DIARRHEA: ICD-10-CM

## 2020-12-10 DIAGNOSIS — Z20.822 SUSPECTED COVID-19 VIRUS INFECTION: Primary | ICD-10-CM

## 2020-12-10 DIAGNOSIS — R05.9 COUGH: ICD-10-CM

## 2020-12-10 LAB
ALBUMIN SERPL BCP-MCNC: 3.9 G/DL (ref 3.5–5)
ALP SERPL-CCNC: 61 U/L (ref 46–116)
ALT SERPL W P-5'-P-CCNC: 80 U/L (ref 12–78)
ANION GAP SERPL CALCULATED.3IONS-SCNC: 12 MMOL/L (ref 4–13)
AST SERPL W P-5'-P-CCNC: 57 U/L (ref 5–45)
ATRIAL RATE: 92 BPM
BASOPHILS # BLD AUTO: 0.02 THOUSANDS/ΜL (ref 0–0.1)
BASOPHILS NFR BLD AUTO: 0 % (ref 0–1)
BILIRUB DIRECT SERPL-MCNC: 0.08 MG/DL (ref 0–0.2)
BILIRUB SERPL-MCNC: 0.23 MG/DL (ref 0.2–1)
BILIRUB UR QL STRIP: NEGATIVE
BUN SERPL-MCNC: 8 MG/DL (ref 5–25)
CALCIUM SERPL-MCNC: 9 MG/DL (ref 8.3–10.1)
CHLORIDE SERPL-SCNC: 101 MMOL/L (ref 100–108)
CLARITY UR: CLEAR
CO2 SERPL-SCNC: 23 MMOL/L (ref 21–32)
COLOR UR: YELLOW
COLOR, POC: YELLOW
CREAT SERPL-MCNC: 0.65 MG/DL (ref 0.6–1.3)
D DIMER PPP FEU-MCNC: 0.85 UG/ML FEU
EOSINOPHIL # BLD AUTO: 0 THOUSAND/ΜL (ref 0–0.61)
EOSINOPHIL NFR BLD AUTO: 0 % (ref 0–6)
ERYTHROCYTE [DISTWIDTH] IN BLOOD BY AUTOMATED COUNT: 11.7 % (ref 11.6–15.1)
GFR SERPL CREATININE-BSD FRML MDRD: 105 ML/MIN/1.73SQ M
GLUCOSE SERPL-MCNC: 131 MG/DL (ref 65–140)
GLUCOSE UR STRIP-MCNC: NEGATIVE MG/DL
HCT VFR BLD AUTO: 44.5 % (ref 34.8–46.1)
HGB BLD-MCNC: 14.9 G/DL (ref 11.5–15.4)
HGB UR QL STRIP.AUTO: NEGATIVE
IMM GRANULOCYTES # BLD AUTO: 0.03 THOUSAND/UL (ref 0–0.2)
IMM GRANULOCYTES NFR BLD AUTO: 1 % (ref 0–2)
KETONES UR STRIP-MCNC: NEGATIVE MG/DL
LEUKOCYTE ESTERASE UR QL STRIP: NEGATIVE
LIPASE SERPL-CCNC: 99 U/L (ref 73–393)
LYMPHOCYTES # BLD AUTO: 1.32 THOUSANDS/ΜL (ref 0.6–4.47)
LYMPHOCYTES NFR BLD AUTO: 22 % (ref 14–44)
MCH RBC QN AUTO: 31.3 PG (ref 26.8–34.3)
MCHC RBC AUTO-ENTMCNC: 33.5 G/DL (ref 31.4–37.4)
MCV RBC AUTO: 94 FL (ref 82–98)
MONOCYTES # BLD AUTO: 0.51 THOUSAND/ΜL (ref 0.17–1.22)
MONOCYTES NFR BLD AUTO: 8 % (ref 4–12)
NEUTROPHILS # BLD AUTO: 4.17 THOUSANDS/ΜL (ref 1.85–7.62)
NEUTS SEG NFR BLD AUTO: 69 % (ref 43–75)
NITRITE UR QL STRIP: NEGATIVE
NRBC BLD AUTO-RTO: 0 /100 WBCS
NT-PROBNP SERPL-MCNC: 12 PG/ML
P AXIS: 32 DEGREES
PH UR STRIP.AUTO: 6.5 [PH] (ref 4.5–8)
PLATELET # BLD AUTO: 200 THOUSANDS/UL (ref 149–390)
PMV BLD AUTO: 9.9 FL (ref 8.9–12.7)
POTASSIUM SERPL-SCNC: 3.3 MMOL/L (ref 3.5–5.3)
PR INTERVAL: 154 MS
PROT SERPL-MCNC: 8.5 G/DL (ref 6.4–8.2)
PROT UR STRIP-MCNC: NEGATIVE MG/DL
QRS AXIS: 38 DEGREES
QRSD INTERVAL: 92 MS
QT INTERVAL: 362 MS
QTC INTERVAL: 447 MS
RBC # BLD AUTO: 4.76 MILLION/UL (ref 3.81–5.12)
SODIUM SERPL-SCNC: 136 MMOL/L (ref 136–145)
SP GR UR STRIP.AUTO: 1.01 (ref 1–1.03)
T WAVE AXIS: 20 DEGREES
TROPONIN I SERPL-MCNC: <0.02 NG/ML
UROBILINOGEN UR QL STRIP.AUTO: 1 E.U./DL
VENTRICULAR RATE: 92 BPM
WBC # BLD AUTO: 6.05 THOUSAND/UL (ref 4.31–10.16)

## 2020-12-10 PROCEDURE — 93010 ELECTROCARDIOGRAM REPORT: CPT | Performed by: INTERNAL MEDICINE

## 2020-12-10 PROCEDURE — 99285 EMERGENCY DEPT VISIT HI MDM: CPT | Performed by: EMERGENCY MEDICINE

## 2020-12-10 PROCEDURE — U0003 INFECTIOUS AGENT DETECTION BY NUCLEIC ACID (DNA OR RNA); SEVERE ACUTE RESPIRATORY SYNDROME CORONAVIRUS 2 (SARS-COV-2) (CORONAVIRUS DISEASE [COVID-19]), AMPLIFIED PROBE TECHNIQUE, MAKING USE OF HIGH THROUGHPUT TECHNOLOGIES AS DESCRIBED BY CMS-2020-01-R: HCPCS | Performed by: EMERGENCY MEDICINE

## 2020-12-10 PROCEDURE — 85379 FIBRIN DEGRADATION QUANT: CPT | Performed by: EMERGENCY MEDICINE

## 2020-12-10 PROCEDURE — 96374 THER/PROPH/DIAG INJ IV PUSH: CPT

## 2020-12-10 PROCEDURE — 84484 ASSAY OF TROPONIN QUANT: CPT | Performed by: EMERGENCY MEDICINE

## 2020-12-10 PROCEDURE — 83690 ASSAY OF LIPASE: CPT | Performed by: EMERGENCY MEDICINE

## 2020-12-10 PROCEDURE — 80076 HEPATIC FUNCTION PANEL: CPT | Performed by: EMERGENCY MEDICINE

## 2020-12-10 PROCEDURE — 96361 HYDRATE IV INFUSION ADD-ON: CPT

## 2020-12-10 PROCEDURE — 85025 COMPLETE CBC W/AUTO DIFF WBC: CPT | Performed by: EMERGENCY MEDICINE

## 2020-12-10 PROCEDURE — 93005 ELECTROCARDIOGRAM TRACING: CPT

## 2020-12-10 PROCEDURE — 80048 BASIC METABOLIC PNL TOTAL CA: CPT | Performed by: EMERGENCY MEDICINE

## 2020-12-10 PROCEDURE — 83880 ASSAY OF NATRIURETIC PEPTIDE: CPT | Performed by: EMERGENCY MEDICINE

## 2020-12-10 PROCEDURE — 36415 COLL VENOUS BLD VENIPUNCTURE: CPT | Performed by: EMERGENCY MEDICINE

## 2020-12-10 PROCEDURE — 81003 URINALYSIS AUTO W/O SCOPE: CPT

## 2020-12-10 PROCEDURE — 71275 CT ANGIOGRAPHY CHEST: CPT

## 2020-12-10 PROCEDURE — G1004 CDSM NDSC: HCPCS

## 2020-12-10 PROCEDURE — 71045 X-RAY EXAM CHEST 1 VIEW: CPT

## 2020-12-10 PROCEDURE — 99284 EMERGENCY DEPT VISIT MOD MDM: CPT

## 2020-12-10 RX ORDER — ACETAMINOPHEN 325 MG/1
975 TABLET ORAL ONCE
Status: COMPLETED | OUTPATIENT
Start: 2020-12-10 | End: 2020-12-10

## 2020-12-10 RX ORDER — KETOROLAC TROMETHAMINE 30 MG/ML
15 INJECTION, SOLUTION INTRAMUSCULAR; INTRAVENOUS ONCE
Status: COMPLETED | OUTPATIENT
Start: 2020-12-10 | End: 2020-12-10

## 2020-12-10 RX ORDER — DICYCLOMINE HCL 20 MG
20 TABLET ORAL ONCE
Status: COMPLETED | OUTPATIENT
Start: 2020-12-10 | End: 2020-12-10

## 2020-12-10 RX ORDER — DICYCLOMINE HCL 20 MG
20 TABLET ORAL 2 TIMES DAILY PRN
Qty: 10 TABLET | Refills: 0 | Status: SHIPPED | OUTPATIENT
Start: 2020-12-10 | End: 2021-09-01

## 2020-12-10 RX ORDER — POTASSIUM CHLORIDE 20 MEQ/1
20 TABLET, EXTENDED RELEASE ORAL ONCE
Status: COMPLETED | OUTPATIENT
Start: 2020-12-10 | End: 2020-12-10

## 2020-12-10 RX ADMIN — KETOROLAC TROMETHAMINE 15 MG: 30 INJECTION, SOLUTION INTRAMUSCULAR at 07:06

## 2020-12-10 RX ADMIN — IOHEXOL 85 ML: 350 INJECTION, SOLUTION INTRAVENOUS at 09:01

## 2020-12-10 RX ADMIN — POTASSIUM CHLORIDE 20 MEQ: 1500 TABLET, EXTENDED RELEASE ORAL at 09:24

## 2020-12-10 RX ADMIN — SODIUM CHLORIDE 1000 ML: 0.9 INJECTION, SOLUTION INTRAVENOUS at 07:05

## 2020-12-10 RX ADMIN — ACETAMINOPHEN 975 MG: 325 TABLET ORAL at 07:05

## 2020-12-10 RX ADMIN — DICYCLOMINE HYDROCHLORIDE 20 MG: 20 TABLET ORAL at 07:05

## 2020-12-11 LAB — SARS-COV-2 RNA SPEC QL NAA+PROBE: DETECTED

## 2020-12-16 RX ORDER — ALBUTEROL SULFATE 90 UG/1
1-2 AEROSOL, METERED RESPIRATORY (INHALATION) EVERY 6 HOURS PRN
Qty: 1 INHALER | Refills: 0 | Status: SHIPPED | OUTPATIENT
Start: 2020-12-16 | End: 2021-09-01

## 2020-12-16 RX ORDER — ALBUTEROL SULFATE 90 UG/1
2 AEROSOL, METERED RESPIRATORY (INHALATION) ONCE
Status: ACTIVE | OUTPATIENT
Start: 2020-12-16

## 2020-12-17 ENCOUNTER — TELEMEDICINE (OUTPATIENT)
Dept: FAMILY MEDICINE CLINIC | Facility: CLINIC | Age: 50
End: 2020-12-17

## 2020-12-17 ENCOUNTER — TELEPHONE (OUTPATIENT)
Dept: FAMILY MEDICINE CLINIC | Facility: CLINIC | Age: 50
End: 2020-12-17

## 2020-12-17 VITALS — TEMPERATURE: 98 F

## 2020-12-17 DIAGNOSIS — U07.1 COVID-19 VIRUS INFECTION: Primary | ICD-10-CM

## 2020-12-17 PROCEDURE — 99212 OFFICE O/P EST SF 10 MIN: CPT | Performed by: FAMILY MEDICINE

## 2020-12-18 ENCOUNTER — TELEMEDICINE (OUTPATIENT)
Dept: FAMILY MEDICINE CLINIC | Facility: CLINIC | Age: 50
End: 2020-12-18

## 2020-12-18 VITALS — TEMPERATURE: 99 F

## 2020-12-18 DIAGNOSIS — U07.1 COVID-19 VIRUS INFECTION: Primary | ICD-10-CM

## 2020-12-18 DIAGNOSIS — R05.9 COUGH: ICD-10-CM

## 2020-12-18 DIAGNOSIS — R09.81 NASAL CONGESTION: ICD-10-CM

## 2020-12-18 PROBLEM — Z90.710 STATUS POST LAPAROSCOPIC HYSTERECTOMY: Status: RESOLVED | Noted: 2018-05-31 | Resolved: 2020-12-18

## 2020-12-18 PROBLEM — Z12.39 BREAST CANCER SCREENING: Status: RESOLVED | Noted: 2018-05-03 | Resolved: 2020-12-18

## 2020-12-18 PROCEDURE — 99213 OFFICE O/P EST LOW 20 MIN: CPT | Performed by: FAMILY MEDICINE

## 2020-12-18 RX ORDER — MONTELUKAST SODIUM 10 MG/1
10 TABLET ORAL
Qty: 30 TABLET | Refills: 5 | Status: SHIPPED | OUTPATIENT
Start: 2020-12-18 | End: 2021-09-01

## 2020-12-18 RX ORDER — FLUTICASONE PROPIONATE 50 MCG
1 SPRAY, SUSPENSION (ML) NASAL DAILY
Qty: 1 BOTTLE | Refills: 1 | Status: SHIPPED | OUTPATIENT
Start: 2020-12-18 | End: 2021-09-01

## 2020-12-19 ENCOUNTER — TELEMEDICINE (OUTPATIENT)
Dept: FAMILY MEDICINE CLINIC | Facility: CLINIC | Age: 50
End: 2020-12-19

## 2020-12-19 DIAGNOSIS — U07.1 COVID-19 VIRUS INFECTION: Primary | ICD-10-CM

## 2020-12-19 PROCEDURE — 99213 OFFICE O/P EST LOW 20 MIN: CPT | Performed by: FAMILY MEDICINE

## 2020-12-20 ENCOUNTER — TELEMEDICINE (OUTPATIENT)
Dept: FAMILY MEDICINE CLINIC | Facility: CLINIC | Age: 50
End: 2020-12-20

## 2020-12-20 VITALS — TEMPERATURE: 99 F

## 2020-12-20 DIAGNOSIS — U07.1 COVID-19 VIRUS INFECTION: Primary | ICD-10-CM

## 2020-12-20 PROCEDURE — 99212 OFFICE O/P EST SF 10 MIN: CPT | Performed by: FAMILY MEDICINE

## 2020-12-22 ENCOUNTER — TELEMEDICINE (OUTPATIENT)
Dept: FAMILY MEDICINE CLINIC | Facility: CLINIC | Age: 50
End: 2020-12-22

## 2020-12-22 DIAGNOSIS — U07.1 COVID-19 VIRUS INFECTION: Primary | ICD-10-CM

## 2020-12-22 PROCEDURE — 99212 OFFICE O/P EST SF 10 MIN: CPT | Performed by: FAMILY MEDICINE

## 2020-12-23 ENCOUNTER — TELEMEDICINE (OUTPATIENT)
Dept: FAMILY MEDICINE CLINIC | Facility: CLINIC | Age: 50
End: 2020-12-23

## 2020-12-23 DIAGNOSIS — U07.1 COVID-19 VIRUS INFECTION: Primary | ICD-10-CM

## 2020-12-23 PROCEDURE — 99212 OFFICE O/P EST SF 10 MIN: CPT | Performed by: FAMILY MEDICINE

## 2021-01-05 ENCOUNTER — TELEPHONE (OUTPATIENT)
Dept: FAMILY MEDICINE CLINIC | Facility: CLINIC | Age: 51
End: 2021-01-05

## 2021-01-05 NOTE — TELEPHONE ENCOUNTER
Pt called requesting to speak with Dr Brayden Gage  She had covid, and was followed by Dr Michel Cardenas  She is going to get an x-ray done this afternoon  She said she is still coughing and still has a runny nose  Her job wants a doctor's note for the pt getting her x-ray done (she is working from home, but they are giving her a hard time)  Her work also wants a letter from Dr Brayden Gage explaining why the pt wasn't treated with antibiotics for her covid  Could someone please call her? Thank you!

## 2021-01-05 NOTE — TELEPHONE ENCOUNTER
Antibiotics are not treatment of choice for covid-she was not hospitalized, rec f/u video visit if still with symptoms

## 2021-01-06 ENCOUNTER — HOSPITAL ENCOUNTER (OUTPATIENT)
Dept: RADIOLOGY | Facility: HOSPITAL | Age: 51
Discharge: HOME | End: 2021-01-06

## 2021-01-06 DIAGNOSIS — U07.1 COVID-19 VIRUS INFECTION: ICD-10-CM

## 2021-01-06 PROCEDURE — 71046 X-RAY EXAM CHEST 2 VIEWS: CPT

## 2021-01-06 NOTE — TELEPHONE ENCOUNTER
Pt states that she is going for her chest xray today at 4 after work  She is still having cough, congestion and gets a rapid heart rate from time to time  She does not have insurance but is willing to do a virtual if nothing can be sent to the pharmacy for her but can we do this after her xray results have been received?

## 2021-01-08 DIAGNOSIS — R05.9 COUGH: Primary | ICD-10-CM

## 2021-01-08 DIAGNOSIS — R05.9 COUGH: ICD-10-CM

## 2021-01-08 RX ORDER — BENZONATATE 200 MG/1
200 CAPSULE ORAL 3 TIMES DAILY PRN
Qty: 30 CAPSULE | Refills: 1 | Status: SHIPPED | OUTPATIENT
Start: 2021-01-08 | End: 2021-01-13 | Stop reason: SDUPTHER

## 2021-01-08 RX ORDER — BENZONATATE 200 MG/1
200 CAPSULE ORAL 3 TIMES DAILY PRN
Qty: 30 CAPSULE | Refills: 1 | Status: SHIPPED | OUTPATIENT
Start: 2021-01-08 | End: 2021-01-08 | Stop reason: SDUPTHER

## 2021-01-08 NOTE — PROGRESS NOTES
PATIENT REQUESTING RX TO GO TO THE [facility], DR Lambert Severs SENT TO Caty Agustin

## 2021-01-12 RX ORDER — BENZONATATE 200 MG/1
200 CAPSULE ORAL 3 TIMES DAILY PRN
Qty: 30 CAPSULE | Refills: 1 | Status: SHIPPED | OUTPATIENT
Start: 2021-01-12 | End: 2021-01-13 | Stop reason: CLARIF

## 2021-01-13 ENCOUNTER — TELEPHONE (OUTPATIENT)
Dept: FAMILY MEDICINE CLINIC | Facility: CLINIC | Age: 51
End: 2021-01-13

## 2021-01-13 DIAGNOSIS — R09.81 NASAL CONGESTION: ICD-10-CM

## 2021-01-13 DIAGNOSIS — R05.9 COUGH: Primary | ICD-10-CM

## 2021-01-13 RX ORDER — METHYLPREDNISOLONE 4 MG/1
TABLET ORAL
Qty: 21 EACH | Refills: 0 | Status: SHIPPED | OUTPATIENT
Start: 2021-01-13 | End: 2021-03-22

## 2021-01-13 NOTE — TELEPHONE ENCOUNTER
Pt called states she haves beentaking Benzonatate for cough and medication haves actually made her cough worst  Pt is supposed to go back to work tomorrow and is wondering if their is anything else she can try please advise  Svetlanademetrio Vernon can be reached at [phone]

## 2021-03-22 ENCOUNTER — OFFICE VISIT (OUTPATIENT)
Dept: FAMILY MEDICINE CLINIC | Facility: CLINIC | Age: 51
End: 2021-03-22
Payer: COMMERCIAL

## 2021-03-22 ENCOUNTER — TELEPHONE (OUTPATIENT)
Dept: FAMILY MEDICINE CLINIC | Facility: CLINIC | Age: 51
End: 2021-03-22

## 2021-03-22 VITALS
SYSTOLIC BLOOD PRESSURE: 118 MMHG | HEIGHT: 61 IN | HEART RATE: 80 BPM | DIASTOLIC BLOOD PRESSURE: 70 MMHG | RESPIRATION RATE: 16 BRPM | WEIGHT: 186 LBS | BODY MASS INDEX: 35.12 KG/M2

## 2021-03-22 DIAGNOSIS — Z12.11 SCREENING FOR COLON CANCER: ICD-10-CM

## 2021-03-22 DIAGNOSIS — M25.50 ARTHRALGIA, UNSPECIFIED JOINT: ICD-10-CM

## 2021-03-22 DIAGNOSIS — R23.2 HOT FLASHES: Primary | ICD-10-CM

## 2021-03-22 PROBLEM — I10 HYPERTENSION, ESSENTIAL: Status: RESOLVED | Noted: 2017-07-17 | Resolved: 2021-03-22

## 2021-03-22 PROBLEM — U07.1 COVID-19 VIRUS INFECTION: Status: RESOLVED | Noted: 2020-12-17 | Resolved: 2021-03-22

## 2021-03-22 PROCEDURE — 99214 OFFICE O/P EST MOD 30 MIN: CPT | Performed by: FAMILY MEDICINE

## 2021-03-22 PROCEDURE — 3008F BODY MASS INDEX DOCD: CPT | Performed by: FAMILY MEDICINE

## 2021-03-22 PROCEDURE — 3725F SCREEN DEPRESSION PERFORMED: CPT | Performed by: FAMILY MEDICINE

## 2021-03-22 NOTE — PROGRESS NOTES
BMI Counseling: Body mass index is 35 14 kg/m²  The BMI is above normal  Nutrition recommendations include reducing portion sizes, decreasing overall calorie intake, 3-5 servings of fruits/vegetables daily, reducing fast food intake and consuming healthier snacks  Exercise recommendations include exercising 3-5 times per week  Assessment and Plan:    Problem List Items Addressed This Visit        Other    Arthralgia     Having pain before  Got covid         Relevant Medications    Diclofenac Sodium (VOLTAREN) 1 %    Other Relevant Orders    CK (with reflex to MB)    RF Screen w/ Reflex to Titer    JEREMY Screen w/ Reflex to Titer/Pattern    C-reactive protein    CBC and differential      Other Visit Diagnoses     Hot flashes    -  Primary    Relevant Medications    Diclofenac Sodium (VOLTAREN) 1 %    Other Relevant Orders    Follicle stimulating hormone    Luteinizing hormone    BMI 35 0-35 9,adult        Screening for colon cancer        Relevant Orders    Ambulatory referral to Gastroenterology                 Diagnoses and all orders for this visit:    Hot flashes  -     Follicle stimulating hormone; Future  -     Luteinizing hormone; Future    Arthralgia, unspecified joint  -     CK (with reflex to MB); Future  -     RF Screen w/ Reflex to Titer; Future  -     JEREMY Screen w/ Reflex to Titer/Pattern; Future  -     C-reactive protein; Future  -     CBC and differential; Future  -     Diclofenac Sodium (VOLTAREN) 1 %; Apply 2 g topically 4 (four) times a day    BMI 35 0-35 9,adult    Screening for colon cancer  -     Ambulatory referral to Gastroenterology; Future              Subjective:      Patient ID: Rupert Pillai is a 49 y o  female  CC:    Chief Complaint   Patient presents with    Leg Pain     pt here with several complaints with lots of joint pain, pt wants labs done     Knee Pain    Hair/Scalp Problem     hair loss- wants testing for menopause   BABAR Ceja       HPI:    Here for joint pain=legs and knees l>r, feels like leg is going to give out, feels better when walks but if walks for extended period both es hurt as well as her back, also hair thinning and having hot flashes-s/p hysterectomy with ovaries left behind, knee pain did improve when on Prednisone      The following portions of the patient's history were reviewed and updated as appropriate: allergies, current medications, past family history, past medical history, past social history, past surgical history and problem list     Review of Systems   Constitutional: Positive for diaphoresis and unexpected weight change  Negative for activity change, appetite change, chills and fever  Has gained weight   Respiratory: Positive for shortness of breath  When sleeping-thinks she might have sleep apnea   Cardiovascular: Negative for chest pain  Musculoskeletal: Positive for arthralgias  Neurological: Negative for dizziness and headaches  Data to review:       Objective:    Vitals:    03/22/21 1621   BP: 118/70   BP Location: Left arm   Patient Position: Sitting   Cuff Size: Large   Pulse: 80   Resp: 16   Weight: 84 4 kg (186 lb)   Height: 5' 1" (1 549 m)        Physical Exam  Vitals signs reviewed  Constitutional:       Appearance: Normal appearance  She is obese  Neck:      Comments: Thyroid not enlarged  Cardiovascular:      Rate and Rhythm: Normal rate and regular rhythm  Pulses: Normal pulses  Heart sounds: Normal heart sounds  Pulmonary:      Effort: Pulmonary effort is normal       Breath sounds: Normal breath sounds  Musculoskeletal:      Comments: Crepitus bilat knees, nl rom   Neurological:      Mental Status: She is alert  BMI Counseling: Body mass index is 35 14 kg/m²  The BMI is above normal  Nutrition recommendations include reducing portion sizes, decreasing overall calorie intake, 3-5 servings of fruits/vegetables daily, reducing fast food intake and consuming healthier snacks   Exercise recommendations include exercising 3-5 times per week

## 2021-03-22 NOTE — TELEPHONE ENCOUNTER
Pt would like to know what can she take in replacement of her naproxen  States this medication is not working for her and forgot to mention this at her office visit  Please advise

## 2021-03-22 NOTE — PATIENT INSTRUCTIONS
Await lab, trial Voltaren gel  Obesity   AMBULATORY CARE:   Obesity  is when your body mass index (BMI) is greater than 30  Your healthcare provider will use your height and weight to measure your BMI  The risks of obesity include  many health problems, such as injuries or physical disability  You may need tests to check for the following:  · Diabetes    · High blood pressure or high cholesterol    · Heart disease    · Gallbladder or liver disease    · Cancer of the colon, breast, prostate, liver, or kidney    · Sleep apnea    · Arthritis or gout    Seek care immediately if:   · You have a severe headache, confusion, or difficulty speaking  · You have weakness on one side of your body  · You have chest pain, sweating, or shortness of breath  Contact your healthcare provider if:   · You have symptoms of gallbladder or liver disease, such as pain in your upper abdomen  · You have knee or hip pain and discomfort while walking  · You have symptoms of diabetes, such as intense hunger and thirst, and frequent urination  · You have symptoms of sleep apnea, such as snoring or daytime sleepiness  · You have questions or concerns about your condition or care  Treatment for obesity  focuses on helping you lose weight to improve your health  Even a small decrease in BMI can reduce the risk for many health problems  Your healthcare provider will help you set a weight-loss goal   · Lifestyle changes  are the first step in treating obesity  These include making healthy food choices and getting regular physical activity  Your healthcare provider may suggest a weight-loss program that involves coaching, education, and therapy  · Medicine  may help you lose weight when it is used with a healthy diet and physical activity  · Surgery  can help you lose weight if you are very obese and have other health problems  There are several types of weight-loss surgery   Ask your healthcare provider for more information  Be successful losing weight:   · Set small, realistic goals  An example of a small goal is to walk for 20 minutes 5 days a week  Anther goal is to lose 5% of your body weight  · Tell friends, family members, and coworkers about your goals  and ask for their support  Ask a friend to lose weight with you, or join a weight-loss support group  · Identify foods or triggers that may cause you to overeat , and find ways to avoid them  Remove tempting high-calorie foods from your home and workplace  Place a bowl of fresh fruit on your kitchen counter  If stress causes you to eat, then find other ways to cope with stress  · Keep a diary to track what you eat and drink  Also write down how many minutes of physical activity you do each day  Weigh yourself once a week and record it in your diary  Eating changes: You will need to eat 500 to 1,000 fewer calories each day than you currently eat to lose 1 to 2 pounds a week  The following changes will help you cut calories:  · Eat smaller portions  Use small plates, no larger than 9 inches in diameter  Fill your plate half full of fruits and vegetables  Measure your food using measuring cups until you know what a serving size looks like  · Eat 3 meals and 1 or 2 snacks each day  Plan your meals in advance  Ever [place] and eat at home most of the time  Eat slowly  Do not skip meals  Skipping meals can lead to overeating later in the day  This can make it harder for you to lose weight  Talk with a dietitian to help you make a meal plan and schedule that is right for you  · Eat fruits and vegetables at every meal   They are low in calories and high in fiber, which makes you feel full  Do not add butter, margarine, or cream sauce to vegetables  Use herbs to season steamed vegetables  · Eat less fat and fewer fried foods  Eat more baked or grilled chicken and fish  These protein sources are lower in calories and fat than red meat   Limit fast food  Dress your salads with olive oil and vinegar instead of bottled dressing  · Limit the amount of sugar you eat  Do not drink sugary beverages  Limit alcohol  Activity changes:  Physical activity is good for your body in many ways  It helps you burn calories and build strong muscles  It decreases stress and depression, and improves your mood  It can also help you sleep better  Talk to your healthcare provider before you begin an exercise program   · Exercise for at least 30 minutes 5 days a week  Start slowly  Set aside time each day for physical activity that you enjoy and that is convenient for you  It is best to do both weight training and an activity that increases your heart rate, such as walking, bicycling, or swimming  · Find ways to be more active  Do yard work and housecleaning  Walk up the stairs instead of using elevators  Spend your leisure time going to events that require walking, such as outdoor festivals or fairs  This extra physical activity can help you lose weight and keep it off  Follow up with your healthcare provider as directed: You may need to meet with a dietitian  Write down your questions so you remember to ask them during your visits  © Copyright [address] [place] [address] Drive Information is for End User's use only and may not be sold, redistributed or otherwise used for commercial purposes  All illustrations and images included in CareNotes® are the copyrighted property of A [employer]  or [facility] Margo Crawford   The above information is an  only  It is not intended as medical advice for individual conditions or treatments  Talk to your doctor, nurse or pharmacist before following any medical regimen to see if it is safe and effective for you      Weight Management   AMBULATORY CARE:   Why it is important to manage your weight:  Being overweight increases your risk of health conditions such as heart disease, high blood pressure, type 2 diabetes, and certain types of cancer  It can also increase your risk for osteoarthritis, sleep apnea, and other respiratory problems  Aim for a slow, steady weight loss  Even a small amount of weight loss can lower your risk of health problems  How to lose weight safely:  A safe and healthy way to lose weight is to eat fewer calories and get regular exercise  · You can lose up about 1 pound a week by decreasing the number of calories you eat by 500 calories each day  You can decrease calories by eating smaller portion sizes or by cutting out high-calorie foods  Read labels to find out how many calories are in the foods you eat  · You can also burn calories with exercise such as walking, swimming, or biking  You will be more likely to keep weight off if you make these changes part of your lifestyle  Exercise at least 30 minutes per day on most days of the week  You can also fit in more physical activity by taking the stairs instead of the elevator or parking farther away from stores  Ask your healthcare provider about the best exercise plan for you  Healthy meal plan for weight management:  A healthy meal plan includes a variety of foods, contains fewer calories, and helps you stay healthy  A healthy meal plan includes the following:     · Eat whole-grain foods more often  A healthy meal plan should contain fiber  Fiber is the part of grains, fruits, and vegetables that is not broken down by your body  Whole-grain foods are healthy and provide extra fiber in your diet  Some examples of whole-grain foods are whole-wheat breads and pastas, oatmeal, brown rice, and bulgur  · Eat a variety of vegetables every day  Include dark, leafy greens such as spinach, kale, isamar greens, and mustard greens  Eat yellow and orange vegetables such as carrots, sweet potatoes, and winter squash  · Eat a variety of fruits every day  Choose fresh or canned fruit (canned in its own juice or light syrup) instead of juice   Fruit juice has very little or no fiber  · Eat low-fat dairy foods  Drink fat-free (skim) milk or 1% milk  Eat fat-free yogurt and low-fat cottage cheese  Try low-fat cheeses such as mozzarella and other reduced-fat cheeses  · Choose meat and other protein foods that are low in fat  Choose beans or other legumes such as split peas or lentils  Choose fish, skinless poultry (chicken or turkey), or lean cuts of red meat (beef or pork)  Before you cook meat or poultry, cut off any visible fat  · Use less fat and oil  Try baking foods instead of frying them  Add less fat, such as margarine, sour cream, regular salad dressing and mayonnaise to foods  Eat fewer high-fat foods  Some examples of high-fat foods include french fries, doughnuts, ice cream, and cakes  · Eat fewer sweets  Limit foods and drinks that are high in sugar  This includes candy, cookies, regular soda, and sweetened drinks  Ways to decrease calories:   · Eat smaller portions  ? Use a small plate with smaller servings  ? Do not eat second helpings  ? When you eat at a restaurant, ask for a box and place half of your meal in the box before you eat  ? Share an entrée with someone else  · Replace high-calorie snacks with healthy, low-calorie snacks  ? Choose fresh fruit, vegetables, fat-free rice cakes, or air-popped popcorn instead of potato chips, nuts, or chocolate  ? Choose water or calorie-free drinks instead of soda or sweetened drinks  · Do not shop for groceries when you are hungry  You may be more likely to make unhealthy food choices  Take a grocery list of healthy foods and shop after you have eaten  · Eat regular meals  Do not skip meals  Skipping meals can lead to overeating later in the day  This can make it harder for you to lose weight   Eat a healthy snack in place of a meal if you do not have time to eat a regular meal  Talk with a dietitian to help you create a meal plan and schedule that is right for you     Other things to consider as you try to lose weight:   · Be aware of situations that may give you the urge to overeat, such as eating while watching television  Find ways to avoid these situations  For example, read a book, go for a walk, or do crafts  · Meet with a weight loss support group or friends who are also trying to lose weight  This may help you stay motivated to continue working on your weight loss goals  © Copyright [address] Information is for End User's use only and may not be sold, redistributed or otherwise used for commercial purposes  All illustrations and images included in CareNotes® are the copyrighted property of A [employer] A [employer] , Inc  or [address] [place], [place] [address]   The above information is an  only  It is not intended as medical advice for individual conditions or treatments  Talk to your doctor, nurse or pharmacist before following any medical regimen to see if it is safe and effective for you

## 2021-03-25 DIAGNOSIS — M25.50 ARTHRALGIA, UNSPECIFIED JOINT: Primary | ICD-10-CM

## 2021-03-25 RX ORDER — CELECOXIB 200 MG/1
200 CAPSULE ORAL 2 TIMES DAILY PRN
Qty: 30 CAPSULE | Refills: 3 | Status: SHIPPED | OUTPATIENT
Start: 2021-03-25 | End: 2021-09-01

## 2021-03-25 NOTE — TELEPHONE ENCOUNTER
Pt states she tried the Diclofenac gel and naproxen and neither helped at all for her knee and back pain  Please advise what else can be given

## 2021-03-29 ENCOUNTER — TELEPHONE (OUTPATIENT)
Dept: FAMILY MEDICINE CLINIC | Facility: CLINIC | Age: 51
End: 2021-03-29

## 2021-03-29 NOTE — TELEPHONE ENCOUNTER
PATIENT SPOKE TO HER INSURANCE COMPANY AND THEY STATE THAT THE GENERIC CONTRAVE IS THE PREFERRED MEDICATION AND WILL BE COVERED FOR PATIENT TO TRY  HER CO-PAY WOULD BE $10/MNTH OR $25/90 DAY  CAN SHE PLEASE HAVE THE 90 DAY SUPPLY? DR Lillie Ponce KNOWS WHAT THE PATIENT IS REFERRING TO  [facility] TO [facility] [address] [facility] [address]

## 2021-03-30 ENCOUNTER — TELEPHONE (OUTPATIENT)
Dept: FAMILY MEDICINE CLINIC | Facility: CLINIC | Age: 51
End: 2021-03-30

## 2021-03-30 RX ORDER — NALTREXONE HYDROCHLORIDE AND BUPROPION HYDROCHLORIDE 8; 90 MG/1; MG/1
1 TABLET, EXTENDED RELEASE ORAL DAILY
Qty: 30 TABLET | Refills: 5 | Status: SHIPPED | OUTPATIENT
Start: 2021-03-30 | End: 2021-09-01

## 2021-04-05 ENCOUNTER — HOSPITAL ENCOUNTER (EMERGENCY)
Facility: HOSPITAL | Age: 51
Discharge: HOME | End: 2021-04-05
Attending: EMERGENCY MEDICINE | Admitting: EMERGENCY MEDICINE
Payer: COMMERCIAL

## 2021-04-05 ENCOUNTER — TELEPHONE (OUTPATIENT)
Dept: FAMILY MEDICINE CLINIC | Facility: CLINIC | Age: 51
End: 2021-04-05

## 2021-04-05 VITALS
SYSTOLIC BLOOD PRESSURE: 186 MMHG | DIASTOLIC BLOOD PRESSURE: 105 MMHG | OXYGEN SATURATION: 97 % | RESPIRATION RATE: 18 BRPM | HEART RATE: 87 BPM | TEMPERATURE: 98.2 F

## 2021-04-05 DIAGNOSIS — Z86.16 HISTORY OF COVID-19: ICD-10-CM

## 2021-04-05 DIAGNOSIS — M54.42 LEFT-SIDED LOW BACK PAIN WITH LEFT-SIDED SCIATICA: Primary | ICD-10-CM

## 2021-04-05 PROCEDURE — 99283 EMERGENCY DEPT VISIT LOW MDM: CPT

## 2021-04-05 PROCEDURE — 99284 EMERGENCY DEPT VISIT MOD MDM: CPT | Performed by: EMERGENCY MEDICINE

## 2021-04-05 PROCEDURE — 96372 THER/PROPH/DIAG INJ SC/IM: CPT

## 2021-04-05 RX ORDER — METHOCARBAMOL 500 MG/1
1000 TABLET, FILM COATED ORAL EVERY 8 HOURS PRN
Qty: 30 TABLET | Refills: 0 | Status: SHIPPED | OUTPATIENT
Start: 2021-04-05 | End: 2021-09-01

## 2021-04-05 RX ORDER — NAPROXEN 500 MG/1
500 TABLET ORAL 2 TIMES DAILY PRN
Qty: 20 TABLET | Refills: 0 | Status: SHIPPED | OUTPATIENT
Start: 2021-04-05 | End: 2021-09-01 | Stop reason: SDUPTHER

## 2021-04-05 RX ORDER — PREDNISONE 20 MG/1
40 TABLET ORAL DAILY
Qty: 10 TABLET | Refills: 0 | Status: SHIPPED | OUTPATIENT
Start: 2021-04-05 | End: 2021-04-10

## 2021-04-05 RX ORDER — LIDOCAINE 50 MG/G
1 PATCH TOPICAL EVERY 24 HOURS
Qty: 30 PATCH | Refills: 0 | Status: SHIPPED | OUTPATIENT
Start: 2021-04-05 | End: 2021-09-01

## 2021-04-05 RX ORDER — METHOCARBAMOL 500 MG/1
1000 TABLET, FILM COATED ORAL ONCE
Status: COMPLETED | OUTPATIENT
Start: 2021-04-05 | End: 2021-04-05

## 2021-04-05 RX ORDER — LIDOCAINE 50 MG/G
1 PATCH TOPICAL ONCE
Status: DISCONTINUED | OUTPATIENT
Start: 2021-04-05 | End: 2021-04-05 | Stop reason: HOSPADM

## 2021-04-05 RX ORDER — KETOROLAC TROMETHAMINE 30 MG/ML
30 INJECTION, SOLUTION INTRAMUSCULAR; INTRAVENOUS ONCE
Status: COMPLETED | OUTPATIENT
Start: 2021-04-05 | End: 2021-04-05

## 2021-04-05 RX ORDER — PREDNISONE 20 MG/1
40 TABLET ORAL ONCE
Status: COMPLETED | OUTPATIENT
Start: 2021-04-05 | End: 2021-04-05

## 2021-04-05 RX ADMIN — PREDNISONE 40 MG: 20 TABLET ORAL at 04:03

## 2021-04-05 RX ADMIN — LIDOCAINE 1 PATCH: 50 PATCH TOPICAL at 04:03

## 2021-04-05 RX ADMIN — KETOROLAC TROMETHAMINE 30 MG: 30 INJECTION, SOLUTION INTRAMUSCULAR; INTRAVENOUS at 04:03

## 2021-04-05 RX ADMIN — METHOCARBAMOL 1000 MG: 500 TABLET ORAL at 04:03

## 2021-04-05 NOTE — Clinical Note
Daytongama Steven was seen and treated in our emergency department on 4/5/2021  Diagnosis:     Tiffany    She may return on this date: 04/07/2021         If you have any questions or concerns, please don't hesitate to call        Azalea Severe, RN    ______________________________           _______________          _______________  Hospital Representative                              Date                                Time

## 2021-04-05 NOTE — Clinical Note
Maria Ines Swanson was seen and treated in our emergency department on 4/5/2021  Diagnosis:     Devonte Mondragon  may return to work on return date  She may return on this date: 04/07/2021         If you have any questions or concerns, please don't hesitate to call        Jimmy Denise RN    ______________________________           _______________          _______________  Hospital Representative                              Date                                Time

## 2021-04-05 NOTE — ED PROVIDER NOTES
History  Chief Complaint   Patient presents with    Leg Pain     Pt c/o bilateral leg pain since december  Denies injuries  48 yo female who presents with pain c/w sciatica since December when diagnosed with COVID  Pain travels L lumbar down L buttock and L post thigh  Occasionally pain crosses lumbar and R leg feels fatigued from placing so much weight on it from pain on L leg  History provided by:  Patient   used: No    Back Pain  Location:  Lumbar spine and gluteal region  Quality:  Aching  Pain severity:  Severe  Onset quality:  Gradual  Duration:  4 months  Timing:  Constant  Progression:  Waxing and waning  Chronicity:  New (since having COVID Dec 2020)  Relieved by:  Nothing  Worsened by:  Palpation (moving L leg, ambulation)  Associated symptoms: leg pain (post left leg)    Associated symptoms: no abdominal pain, no bladder incontinence, no bowel incontinence, no chest pain, no dysuria, no fever, no headaches, no paresthesias, no perianal numbness and no tingling        Prior to Admission Medications   Prescriptions Last Dose Informant Patient Reported? Taking?    Diclofenac Sodium (VOLTAREN) 1 %   No No   Sig: Apply 2 g topically 4 (four) times a day   Naltrexone-buPROPion HCl ER (Contrave) 8-90 MG TB12   No No   Sig: Take 1 tablet by mouth daily   albuterol (Ventolin HFA) 90 mcg/act inhaler   No No   Sig: Inhale 1-2 puffs every 6 (six) hours as needed for wheezing   celecoxib (CeleBREX) 200 mg capsule   No No   Sig: Take 1 capsule (200 mg total) by mouth 2 (two) times a day as needed for mild pain or moderate pain   dicyclomine (BENTYL) 20 mg tablet   No No   Sig: Take 1 tablet (20 mg total) by mouth 2 (two) times a day as needed (diarrhea) for up to 10 doses   fluticasone (FLONASE) 50 mcg/act nasal spray   No No   Si spray into each nostril daily   montelukast (SINGULAIR) 10 mg tablet   No No   Sig: Take 1 tablet (10 mg total) by mouth daily at bedtime Facility-Administered Medications: None       Past Medical History:   Diagnosis Date    Anemia     resolved 12/08/16    Anxiety     Depression     Hypertension     Mental disorder     Trichomonal vulvovaginitis     last assessed 02/14/13       Past Surgical History:   Procedure Laterality Date    APPENDECTOMY      LA LAPAROSCOPY W TOT HYSTERECTUTERUS <=250 GRAM  W TUBE/OVARY N/A 5/31/2018    Procedure: ROBOTIC TOTAL LAPAROSCOPIC HYSTERECTOMY,    BILATERAL SALPINGECTOMY AND INTRA-OP CYSTO;  Surgeon: Star Mota MD;  Location: [facility];  Service: Gynecology Oncology    TUBAL LIGATION         Family History   Problem Relation Age of Onset    Hypertension Mother     Cancer Mother     No Known Problems Father     No Known Problems Sister     No Known Problems Maternal Grandmother     No Known Problems Maternal Grandfather     No Known Problems Paternal Grandmother     No Known Problems Paternal Grandfather      I have reviewed and agree with the history as documented  E-Cigarette/Vaping     E-Cigarette/Vaping Substances     Social History     Tobacco Use    Smoking status: Never Smoker    Smokeless tobacco: Never Used   Substance Use Topics    Alcohol use: Yes     Alcohol/week: 4 0 standard drinks     Types: 4 Glasses of wine per week    Drug use: No       Review of Systems   Constitutional: Negative for appetite change, chills, fatigue and fever  HENT: Negative for sore throat  Eyes: Negative for visual disturbance  Respiratory: Negative for shortness of breath  Cardiovascular: Negative for chest pain  Gastrointestinal: Negative for abdominal pain, bowel incontinence, diarrhea, nausea and vomiting  Genitourinary: Negative for bladder incontinence, dysuria, frequency, vaginal bleeding and vaginal discharge  Musculoskeletal: Positive for back pain (L lumbar radiating down L post thigh)  Negative for neck pain and neck stiffness  Skin: Negative for pallor and rash  Allergic/Immunologic: Negative for immunocompromised state  Neurological: Negative for tingling, light-headedness, headaches and paresthesias  Psychiatric/Behavioral: Negative for confusion  All other systems reviewed and are negative  Physical Exam  Physical Exam  Vitals signs and nursing note reviewed  Constitutional:       General: She is in acute distress (uncomfortable attempting to move in bed for exam)  Appearance: She is well-developed  HENT:      Head: Normocephalic and atraumatic  Eyes:      Extraocular Movements: Extraocular movements intact  Neck:      Musculoskeletal: Neck supple  Cardiovascular:      Rate and Rhythm: Normal rate and regular rhythm  Heart sounds: No murmur  Pulmonary:      Effort: Pulmonary effort is normal  No respiratory distress  Breath sounds: Normal breath sounds  Abdominal:      General: Bowel sounds are normal       Palpations: Abdomen is soft  Tenderness: There is no abdominal tenderness  Musculoskeletal: Normal range of motion  General: Tenderness (muscle spasm L lumbar and tenderness over L piriformis muscle) present  Skin:     General: Skin is warm  Capillary Refill: Capillary refill takes less than 2 seconds  Coloration: Skin is not pale  Findings: No rash  Neurological:      General: No focal deficit present  Mental Status: She is alert and oriented to person, place, and time  Mental status is at baseline        Deep Tendon Reflexes: Reflexes normal    Psychiatric:         Behavior: Behavior normal          Vital Signs  ED Triage Vitals [04/05/21 0331]   Temperature Pulse Respirations Blood Pressure SpO2   98 2 °F (36 8 °C) 87 18 (!) 186/105 97 %      Temp Source Heart Rate Source Patient Position - Orthostatic VS BP Location FiO2 (%)   Oral Monitor -- Right arm --      Pain Score       --           Vitals:    04/05/21 0331   BP: (!) 186/105   Pulse: 87         Visual Acuity      ED Medications  Medications   lidocaine (LIDODERM) 5 % patch 1 patch (1 patch Topical Medication Applied 4/5/21 0403)   ketorolac (TORADOL) injection 30 mg (30 mg Intramuscular Given 4/5/21 0403)   predniSONE tablet 40 mg (40 mg Oral Given 4/5/21 0403)   methocarbamol (ROBAXIN) tablet 1,000 mg (1,000 mg Oral Given 4/5/21 0403)       Diagnostic Studies  Results Reviewed     None                 No orders to display              Procedures  Procedures         ED Course  ED Course as of Apr 05 0413 Mon Apr 05, 2021   0329 Pt seen and examined  48 yo female who presents with pain c/w sciatica since December when diagnosed with COVID  Pain travels L lumbar down L buttock and L post thigh  Occasionally pain crosses lumbar and R leg feels fatigued from placing so much weight on it from pain on L leg  Will give IM toradol, prednisone, lidoderm patch and robaxin  Pt to f/u with comp spine program as needed  MDM    Disposition  Final diagnoses:   Left-sided low back pain with left-sided sciatica   History of COVID-19     Time reflects when diagnosis was documented in both MDM as applicable and the Disposition within this note     Time User Action Codes Description Comment    4/5/2021  3:56 AM Pat DENTON Add [M54 42] Left-sided low back pain with left-sided sciatica     4/5/2021  3:57 AM Jose Raul Larios Add [Z86 16] History of COVID-19       ED Disposition     ED Disposition Condition Date/Time Comment    Discharge Stable Mon Apr 5, 2021  3:56 AM Sharron Caba discharge to home/self care              Follow-up Information     Follow up With Specialties Details Why Contact Info Additional Information    Leandra Ferreira MD Family Medicine Schedule an appointment as soon as possible for a visit  As needed [id number] [facility] [phone]       [address] [facility] Schedule an appointment as soon as possible for a visit [phone]          Patient's Medications   Discharge Prescriptions    LIDOCAINE (LIDODERM) 5 %    Apply 1 patch topically every 24 hours Remove & Discard patch within 12 hours or as directed by MD       Start Date: 4/5/2021  End Date: 5/5/2021       Order Dose: 1 patch       Quantity: 30 patch    Refills: 0    METHOCARBAMOL (ROBAXIN) 500 MG TABLET    Take 2 tablets (1,000 mg total) by mouth every 8 (eight) hours as needed for muscle spasms for up to 7 days       Start Date: 4/5/2021  End Date: 4/12/2021       Order Dose: 1,000 mg       Quantity: 30 tablet    Refills: 0    NAPROXEN (NAPROSYN) 500 MG TABLET    Take 1 tablet (500 mg total) by mouth 2 (two) times a day as needed for mild pain or moderate pain for up to 10 days       Start Date: 4/5/2021  End Date: 4/15/2021       Order Dose: 500 mg       Quantity: 20 tablet    Refills: 0    PREDNISONE 20 MG TABLET    Take 2 tablets (40 mg total) by mouth daily for 5 days       Start Date: 4/5/2021  End Date: 4/10/2021       Order Dose: 40 mg       Quantity: 10 tablet    Refills: 0     No discharge procedures on file      PDMP Review       Value Time User    PDMP Reviewed  Yes 1/8/2020  9:32 PM Enma Barron MD          ED Provider  Electronically Signed by           Tram Gaming DO  04/05/21 5303

## 2021-04-06 ENCOUNTER — OFFICE VISIT (OUTPATIENT)
Dept: FAMILY MEDICINE CLINIC | Facility: CLINIC | Age: 51
End: 2021-04-06
Payer: COMMERCIAL

## 2021-04-06 ENCOUNTER — NURSE TRIAGE (OUTPATIENT)
Dept: PHYSICAL THERAPY | Facility: OTHER | Age: 51
End: 2021-04-06

## 2021-04-06 ENCOUNTER — HOSPITAL ENCOUNTER (OUTPATIENT)
Dept: RADIOLOGY | Facility: HOSPITAL | Age: 51
Discharge: HOME | End: 2021-04-06
Payer: COMMERCIAL

## 2021-04-06 VITALS
DIASTOLIC BLOOD PRESSURE: 88 MMHG | RESPIRATION RATE: 16 BRPM | HEIGHT: 61 IN | SYSTOLIC BLOOD PRESSURE: 142 MMHG | BODY MASS INDEX: 35.12 KG/M2 | WEIGHT: 186 LBS | HEART RATE: 88 BPM

## 2021-04-06 DIAGNOSIS — M54.42 ACUTE LEFT-SIDED LOW BACK PAIN WITH LEFT-SIDED SCIATICA: Primary | ICD-10-CM

## 2021-04-06 DIAGNOSIS — M25.562 ACUTE PAIN OF LEFT KNEE: ICD-10-CM

## 2021-04-06 PROCEDURE — 1036F TOBACCO NON-USER: CPT | Performed by: NURSE PRACTITIONER

## 2021-04-06 PROCEDURE — 3008F BODY MASS INDEX DOCD: CPT | Performed by: NURSE PRACTITIONER

## 2021-04-06 PROCEDURE — 73562 X-RAY EXAM OF KNEE 3: CPT

## 2021-04-06 PROCEDURE — 99214 OFFICE O/P EST MOD 30 MIN: CPT | Performed by: NURSE PRACTITIONER

## 2021-04-06 NOTE — ASSESSMENT & PLAN NOTE
Patient will be maintained on the pain regimen that was prescribed by the ED physician  Comprehensive spine program and pain management referrals placed

## 2021-04-06 NOTE — LETTER
April 6, 2021     Patient: Marisela Montes De Oca   YOB: 1970   Date of Visit: 4/6/2021       To Whom it May Concern: Marisela Montes De Oca is under my professional care  She was seen in my office on 4/6/2021  She may return to work on 4/9/2021 with no restrictions  The patient has an acute bout of sciatica which she is being treated for  She is excused from work from 4/5/21-4/8/21  If you have any questions or concerns, please don't hesitate to call           Sincerely,          STERLING Hernandez        CC: No Recipients

## 2021-04-06 NOTE — TELEPHONE ENCOUNTER
Additional Information   Negative: Is this related to a work injury?  Negative: Is this related to an MVA?  Negative: Are you currently recieving homecare services?  Negative: Has the patient had unexplained weight loss?  Negative: Does the patient have a fever?  Negative: Is the patient experiencing blood in sputum?  Negative: Is the patient experiencing urine retention?  Negative: Is the patient experiencing acute drop foot or paralysis?  Negative: Has the patient experienced major trauma? (fall from height, high speed collision, direct blow to spine) and is also experiencing nausea, light-headedness, or loss of consciousness?  Negative: Is this a chronic condition? Background - Initial Assessment  Clinical complaint: lower left side back pain which radiates down the leftt leg to the thigh  Numbness and tingling in the left leg  No history of back in past  No injury or incident noted  Date of onset: 12/2020  Frequency of pain: constant  Quality of pain: numb, sharp, shooting and throbbing    Protocols used: [facility] COMPREHENSIVE SPINE PROGRAM PROTOCOL    This RN did review in detail the Comprehensive Spine Program and what we can provide for their back pain  Patient is agreeable to being triaged by this RN and would like to proceed with Physical Therapy  Referral was placed for Physical Therapy at the [facility] site  Patients information was sent to the  to make evaluation appointment  Patient made aware that the PT office  will be calling to schedule the appointment  Patient was provided with the phone number to the PT office  No further questions and/or concerns were voiced by the patient at this time  Patient states understanding of the referral that was placed  Referral Closed

## 2021-04-06 NOTE — PROGRESS NOTES
Assessment and Plan:    Problem List Items Addressed This Visit        Other    Acute left-sided low back pain with left-sided sciatica - Primary     Patient will be maintained on the pain regimen that was prescribed by the ED physician  Comprehensive spine program and pain management referrals placed  Relevant Orders    Ambulatory Referral to Comprehensive Spine Program    Ambulatory referral to Pain Management    Acute pain of left knee     This pain is most likely from osteoarthritis however, she does report instability in the knee therefore, x-ray of the knee ordered to assess for any acute fractures or abnormalities  This can also be evaluated by PT  Relevant Orders    XR knee 3 vw left non injury                 Diagnoses and all orders for this visit:    Acute left-sided low back pain with left-sided sciatica  -     Ambulatory Referral to Comprehensive Spine Program; Future  -     Ambulatory referral to Pain Management; Future    Acute pain of left knee  -     XR knee 3 vw left non injury; Future              Subjective:      Patient ID: Emeka Patterson is a 49 y o  female  CC:    Chief Complaint   Patient presents with    Follow-up     pt here for a er follow up  BABAR Ceja       HPI:    Left sided low back pain with sciatica: Seen in the ED yesterday for this  She states this pain has been occurring since December  Patient was evaluated in ED for this pain yesterday and was started on Lidocaine patches, Robaxin q  Nico Simple 8 PRN, Naproxen bid PRN, and a 5 day course of Prednisone 40 mg  Patient was also prescribed Celebrex bid PRN in the past for pain  Patient states that she has taken Prednisone and Robaxin so far but it has not seemed to help much  She states that the Lidoderm was not covered by her insurance so she has not used this yet  Denies any bowel or bladder incontinence       Left knee: States that she was stretching this morning and she felt the knee "pop " She is noted to have pain in this knee in the past  She was prescribed Voltaren gel in the past and Celebrex but she states he has not taken Celebrex over concerns of side effects  She states that Voltaren does not help to control her pain  Patient reports that she feels that her knee is going to give out on her sometimes especially when going down stairs  Reports that she has associated numbness from the left sided sciatica that goes in to her left knee and down her left leg  She states she then compensates with her right leg and this causes right knee pain as well  The following portions of the patient's history were reviewed and updated as appropriate: allergies, current medications, past family history, past medical history, past social history, past surgical history and problem list       Review of Systems   Constitutional: Negative for chills and fever  HENT: Negative for ear pain and sore throat  Eyes: Negative for pain and visual disturbance  Respiratory: Negative for cough, chest tightness, shortness of breath and wheezing  Cardiovascular: Negative for chest pain, palpitations and leg swelling  Gastrointestinal: Negative for abdominal pain, constipation, diarrhea, nausea and vomiting  Endocrine: Negative for cold intolerance and heat intolerance  Genitourinary: Negative for decreased urine volume, dysuria and hematuria  Musculoskeletal: Positive for arthralgias (left knee pain), back pain (lower back pain with associated left sided sciatica ) and gait problem (pain with ambulation )  Negative for joint swelling and myalgias  Skin: Negative for color change and rash  Allergic/Immunologic: Negative for environmental allergies  Neurological: Positive for numbness (reports some associated numbness in LLE)  Negative for dizziness, seizures, syncope, weakness, light-headedness and headaches  Hematological: Negative for adenopathy  Psychiatric/Behavioral: Negative for confusion   The patient is not nervous/anxious  All other systems reviewed and are negative  Data to review:       Objective:    Vitals:    04/06/21 0808   BP: 142/88   BP Location: Left arm   Patient Position: Sitting   Cuff Size: Large   Pulse: 88   Resp: 16   Weight: 84 4 kg (186 lb)   Height: 5' 1" (1 549 m)        Physical Exam  Vitals signs and nursing note reviewed  Constitutional:       General: She is not in acute distress  Appearance: Normal appearance  She is well-developed  She is not ill-appearing  HENT:      Head: Normocephalic and atraumatic  Eyes:      Conjunctiva/sclera: Conjunctivae normal    Neck:      Musculoskeletal: Normal range of motion and neck supple  Cardiovascular:      Rate and Rhythm: Normal rate and regular rhythm  Pulses: Normal pulses  Carotid pulses are 2+ on the right side and 2+ on the left side  Posterior tibial pulses are 2+ on the right side and 2+ on the left side  Heart sounds: Normal heart sounds  No murmur  Pulmonary:      Effort: Pulmonary effort is normal  No respiratory distress  Breath sounds: Normal breath sounds  No wheezing or rhonchi  Abdominal:      General: Abdomen is flat  Bowel sounds are normal  There is no distension  Palpations: Abdomen is soft  Tenderness: There is no abdominal tenderness  There is no guarding  Musculoskeletal:      Left knee: She exhibits decreased range of motion  She exhibits no swelling, no effusion, no deformity and no erythema  No tenderness found  No medial joint line, no lateral joint line, no MCL, no LCL and no patellar tendon tenderness noted  Lumbar back: She exhibits decreased range of motion, tenderness, bony tenderness, pain and spasm  She exhibits no swelling, no edema and no deformity  Right lower leg: No edema  Left lower leg: No edema  Comments: Pain noted upon palpation of lumbar spine  Pain also elicited with palpation of SI joint on left side   Straight leg raise test positive in left leg  La's and anterior drawer tests elicited pain in the patient however, I am unsure if these can e considered true positives because this pain may have still been related to sciatica as I had to lift the patient's left leg to perform these tests  Noted crepitus with manipulation of left knee and decreased ROM and pain with movement  Skin:     General: Skin is warm and dry  Capillary Refill: Capillary refill takes less than 2 seconds  Neurological:      General: No focal deficit present  Mental Status: She is alert and oriented to person, place, and time  Sensory: No sensory deficit  Motor: No weakness  Coordination: Coordination normal    Psychiatric:         Mood and Affect: Mood normal          Behavior: Behavior normal          Thought Content:  Thought content normal          Judgment: Judgment normal

## 2021-04-06 NOTE — PATIENT INSTRUCTIONS
Problem List Items Addressed This Visit        Other    Acute left-sided low back pain with left-sided sciatica - Primary     Patient will be maintained on the pain regimen that was prescribed by the ED physician  Comprehensive spine program and pain management referrals placed  Relevant Orders    Ambulatory Referral to Comprehensive Spine Program    Ambulatory referral to Pain Management    Acute pain of left knee     This pain is most likely from osteoarthritis however, she does report instability in the knee therefore, x-ray of the knee ordered to assess for any acute fractures or abnormalities  This can also be evaluated by PT  Relevant Orders    XR knee 3 vw left non injury        COVID-19 Home Care Guidelines    Your healthcare provider and/or public health staff have evaluated you and have determined that you do not need to remain in the hospital at this time  At this time you can be isolated at home where you will be monitored by staff from your local or state health department  You should carefully follow the prevention and isolation steps below until a healthcare provider or local or state health department says that you can return to your normal activities  Stay home except to get medical care    People who are mildly ill with COVID-19 are able to isolate at home during their illness  You should restrict activities outside your home, except for getting medical care  Do not go to work, school, or public areas  Avoid using public transportation, ride-sharing, or taxis  Separate yourself from other people and animals in your home    People: As much as possible, you should stay in a specific room and away from other people in your home  Also, you should use a separate bathroom, if available  Animals: You should restrict contact with pets and other animals while you are sick with COVID-19, just like you would around other people   Although there have not been reports of pets or other animals becoming sick with COVID-19, it is still recommended that people sick with COVID-19 limit contact with animals until more information is known about the virus  When possible, have another member of your household care for your animals while you are sick  If you are sick with COVID-19, avoid contact with your pet, including petting, snuggling, being kissed or licked, and sharing food  If you must care for your pet or be around animals while you are sick, wash your hands before and after you interact with pets and wear a facemask  See COVID-19 and Animals for more information  Call ahead before visiting your doctor    If you have a medical appointment, call the healthcare provider and tell them that you have or may have COVID-19  This will help the healthcare providers office take steps to keep other people from getting infected or exposed  Wear a facemask    You should wear a facemask when you are around other people (e g , sharing a room or vehicle) or pets and before you enter a healthcare providers office  If you are not able to wear a facemask (for example, because it causes trouble breathing), then people who live with you should not stay in the same room with you, or they should wear a facemask if they enter your room  Cover your coughs and sneezes    Cover your mouth and nose with a tissue when you cough or sneeze  Throw used tissues in a lined trash can  Immediately wash your hands with soap and water for at least 20 seconds or, if soap and water are not available, clean your hands with an alcohol-based hand  that contains at least 60% alcohol  Clean your hands often    Wash your hands often with soap and water for at least 20 seconds, especially after blowing your nose, coughing, or sneezing; going to the bathroom; and before eating or preparing food   If soap and water are not readily available, use an alcohol-based hand  with at least 60% alcohol, covering all surfaces of your hands and rubbing them together until they feel dry  Soap and water are the best option if hands are visibly dirty  Avoid touching your eyes, nose, and mouth with unwashed hands  Avoid sharing personal household items    You should not share dishes, drinking glasses, cups, eating utensils, towels, or bedding with other people or pets in your home  After using these items, they should be washed thoroughly with soap and water  Clean all high-touch surfaces everyday    High touch surfaces include counters, tabletops, doorknobs, bathroom fixtures, toilets, phones, keyboards, tablets, and bedside tables  Also, clean any surfaces that may have blood, stool, or body fluids on them  Use a household cleaning spray or wipe, according to the label instructions  Labels contain instructions for safe and effective use of the cleaning product including precautions you should take when applying the product, such as wearing gloves and making sure you have good ventilation during use of the product  Monitor your symptoms    Seek prompt medical attention if your illness is worsening (e g , difficulty breathing)  Before seeking care, call your healthcare provider and tell them that you have, or are being evaluated for, COVID-19  Put on a facemask before you enter the facility  These steps will help the healthcare providers office to keep other people in the office or waiting room from getting infected or exposed  Ask your healthcare provider to call the local or state health department  Persons who are placed under active monitoring or facilitated self-monitoring should follow instructions provided by their local health department or occupational health professionals, as appropriate  If you have a medical emergency and need to call 911, notify the dispatch personnel that you have, or are being evaluated for COVID-19  If possible, put on a facemask before emergency medical services arrive      Discontinuing home isolation    Patients with confirmed COVID-19 should remain under home isolation precautions until the following conditions are met:   - They have had no fever for at least 24 hours (that is one full day of no fever without the use medicine that reduces fevers)  AND  - other symptoms have improved (for example, when their cough or shortness of breath have improved)  AND  - If had mild or moderate illness, at least 10 days have passed since their symptoms first appeared or if severe illness (needed oxygen) or immunosuppressed, at least 20 days have passed since symptoms first appeared  Patients with confirmed COVID-19 should also notify close contacts (including their workplace) and ask that they self-quarantine  Currently, close contact is defined as being within 6 feet for 15 minutes or more from the period 24 hours starting 48 hours before symptom onset to the time at which the patient went into isolation  Close contacts of patients diagnosed with COVID-19 should be instructed by the patient to self-quarantine for 14 days from the last time of their last contact with the patient       Source: [employer]

## 2021-04-06 NOTE — ASSESSMENT & PLAN NOTE
This pain is most likely from osteoarthritis however, she does report instability in the knee therefore, x-ray of the knee ordered to assess for any acute fractures or abnormalities    This can also be evaluated by PT

## 2021-04-08 ENCOUNTER — TELEPHONE (OUTPATIENT)
Dept: FAMILY MEDICINE CLINIC | Facility: CLINIC | Age: 51
End: 2021-04-08

## 2021-04-08 DIAGNOSIS — M54.42 ACUTE LEFT-SIDED LOW BACK PAIN WITH LEFT-SIDED SCIATICA: Primary | ICD-10-CM

## 2021-04-08 RX ORDER — TRAMADOL HYDROCHLORIDE 50 MG/1
50 TABLET ORAL EVERY 6 HOURS PRN
Qty: 30 TABLET | Refills: 0 | Status: SHIPPED | OUTPATIENT
Start: 2021-04-08 | End: 2021-09-01

## 2021-04-08 NOTE — TELEPHONE ENCOUNTER
DL:  PT STILL IN A LOT OF PAIN, AND WILL NEED A NOTE FOR WORK  SHE IS ASKING WILL YOU REFILL HER PREDNISONE AND SHE STILL HAS NOT HEARD ANYTHING ABOUT THE PAIN PATCHES    PT CAN RE REACHED ON HER CELL [phone]

## 2021-04-08 NOTE — TELEPHONE ENCOUNTER
Patient should continue the Prednisone until it is finished  Tramadol was ordered for the patient to be taken every 6 hours  She can begin the Tramadol as soon as she picks it up from her pharmacy

## 2021-04-08 NOTE — TELEPHONE ENCOUNTER
It hasn't been 5 days since I prescribed the Prednisone how can she be finished taking it and need a refill?

## 2021-04-08 NOTE — TELEPHONE ENCOUNTER
Patient called back and she was given the prednisone in the hospital, it was for 10 pills and take 2 a day  Tomorrow will be the last day  She is wondering if she is supposed to take them longer, if so that is why she asked for a refill

## 2021-04-12 ENCOUNTER — TELEPHONE (OUTPATIENT)
Dept: PAIN MEDICINE | Facility: MEDICAL CENTER | Age: 51
End: 2021-04-12

## 2021-04-12 NOTE — TELEPHONE ENCOUNTER
Called patient as she missed pain management appointment this morning, left message for patient to call back if she wishes to reschedule

## 2021-07-06 ENCOUNTER — HOSPITAL ENCOUNTER (EMERGENCY)
Facility: HOSPITAL | Age: 51
Discharge: HOME | End: 2021-07-06
Attending: EMERGENCY MEDICINE | Admitting: EMERGENCY MEDICINE
Payer: COMMERCIAL

## 2021-07-06 VITALS
SYSTOLIC BLOOD PRESSURE: 144 MMHG | HEART RATE: 87 BPM | TEMPERATURE: 97.8 F | RESPIRATION RATE: 16 BRPM | OXYGEN SATURATION: 98 % | DIASTOLIC BLOOD PRESSURE: 97 MMHG | BODY MASS INDEX: 34.16 KG/M2 | WEIGHT: 180.78 LBS

## 2021-07-06 DIAGNOSIS — H10.10 ALLERGIC CONJUNCTIVITIS: Primary | ICD-10-CM

## 2021-07-06 DIAGNOSIS — H11.421 CHEMOSIS OF CONJUNCTIVA, RIGHT: ICD-10-CM

## 2021-07-06 PROCEDURE — 99284 EMERGENCY DEPT VISIT MOD MDM: CPT | Performed by: EMERGENCY MEDICINE

## 2021-07-06 PROCEDURE — 99282 EMERGENCY DEPT VISIT SF MDM: CPT

## 2021-07-06 RX ORDER — TETRACAINE HYDROCHLORIDE 5 MG/ML
1 SOLUTION OPHTHALMIC ONCE
Status: COMPLETED | OUTPATIENT
Start: 2021-07-06 | End: 2021-07-06

## 2021-07-06 RX ADMIN — FLUORESCEIN SODIUM 1 STRIP: 1 STRIP OPHTHALMIC at 11:51

## 2021-07-06 RX ADMIN — TETRACAINE HYDROCHLORIDE 1 DROP: 5 SOLUTION OPHTHALMIC at 11:51

## 2021-07-06 NOTE — Clinical Note
Gino Huertas was seen and treated in our emergency department on 7/6/2021  Diagnosis:     Yaa Sinclair  may return to work on return date  She may return on this date: 07/07/2021         If you have any questions or concerns, please don't hesitate to call        Simba    ______________________________           _______________          _______________  Hospital Representative                              Date                                Time

## 2021-07-06 NOTE — ED PROVIDER NOTES
History  Chief Complaint   Patient presents with    Eye Redness     b/l eye redness and pain after swimming in the pool yesterday,      A 56-year-old female with past medical history of hypertension, anxiety and depression; presents with bilateral eye redness, R>L  Patient states yesterday she was swimming in a pool, when she had sudden onset of eye irritation, congestion, scratchy throat and cough  Patient states over the course of several hours she developed persistent eye irritation, followed by swelling, tearing and redness  Patient states she took a Benadryl with some improvement in her eye irritation, and resolution of the congestion, scratchy throat and cough  Patient states this morning her eye redness and irritation persisted, which prompted ED evaluation  Patient does complain of pain around the eye, worse with palpation  Patient denies pain with ocular motion  Patient denies visual changes  Patient denies recurrence of the congestion, scratchy throat and cough  Patient has otherwise not had fever, chills, chest pain, shortness of breath, abdominal pain, nausea, vomiting, diarrhea, peripheral edema and rashes  Patient reports that she does usually wear contacts, and was able to remove the left contact yesterday however is unsure if the right contact is still in  A/P:  Bilateral eye redness, right > left  On exam, patient has significant conjunctival injection and chemosis of the right eye  No foreign bodies appreciated  No corneal abrasion noted to the right eye  Suspect symptoms are allergic in nature  Will recommend antihistamine eyedrops  Will provide [facility] follow up in the event symptoms persist       History provided by:  Patient      Prior to Admission Medications   Prescriptions Last Dose Informant Patient Reported? Taking?    Diclofenac Sodium (VOLTAREN) 1 %   No No   Sig: Apply 2 g topically 4 (four) times a day   Naltrexone-buPROPion HCl ER (Contrave) 8-90 MG TB12   No No   Sig: Take 1 tablet by mouth daily   albuterol (Ventolin HFA) 90 mcg/act inhaler   No No   Sig: Inhale 1-2 puffs every 6 (six) hours as needed for wheezing   celecoxib (CeleBREX) 200 mg capsule   No No   Sig: Take 1 capsule (200 mg total) by mouth 2 (two) times a day as needed for mild pain or moderate pain   dicyclomine (BENTYL) 20 mg tablet   No No   Sig: Take 1 tablet (20 mg total) by mouth 2 (two) times a day as needed (diarrhea) for up to 10 doses   fluticasone (FLONASE) 50 mcg/act nasal spray   No No   Si spray into each nostril daily   lidocaine (LIDODERM) 5 %   No No   Sig: Apply 1 patch topically every 24 hours Remove & Discard patch within 12 hours or as directed by MD   Patient not taking: Reported on 2021   methocarbamol (ROBAXIN) 500 mg tablet   No No   Sig: Take 2 tablets (1,000 mg total) by mouth every 8 (eight) hours as needed for muscle spasms for up to 7 days   montelukast (SINGULAIR) 10 mg tablet   No No   Sig: Take 1 tablet (10 mg total) by mouth daily at bedtime   naproxen (NAPROSYN) 500 mg tablet   No No   Sig: Take 1 tablet (500 mg total) by mouth 2 (two) times a day as needed for mild pain or moderate pain for up to 10 days   traMADol (ULTRAM) 50 mg tablet   No No   Sig: Take 1 tablet (50 mg total) by mouth every 6 (six) hours as needed for moderate pain      Facility-Administered Medications: None       Past Medical History:   Diagnosis Date    Anemia     resolved 16    Anxiety     Depression     Hypertension     Mental disorder     Trichomonal vulvovaginitis     last assessed 13       Past Surgical History:   Procedure Laterality Date    APPENDECTOMY      CA LAPAROSCOPY W TOT HYSTERECTUTERUS <=250 GRAM  W TUBE/OVARY N/A 2018    Procedure: ROBOTIC TOTAL LAPAROSCOPIC HYSTERECTOMY,    BILATERAL SALPINGECTOMY AND INTRA-OP CYSTO;  Surgeon: Thais Forrest MD;  Location: [facility];  Service: Gynecology Oncology    TUBAL LIGATION Family History   Problem Relation Age of Onset    Hypertension Mother     Cancer Mother     No Known Problems Father     No Known Problems Sister     No Known Problems Maternal Grandmother     No Known Problems Maternal Grandfather     No Known Problems Paternal Grandmother     No Known Problems Paternal Grandfather      I have reviewed and agree with the history as documented  E-Cigarette/Vaping     E-Cigarette/Vaping Substances     Social History     Tobacco Use    Smoking status: Never Smoker    Smokeless tobacco: Never Used   Substance Use Topics    Alcohol use: Yes     Alcohol/week: 4 0 standard drinks     Types: 4 Glasses of wine per week    Drug use: No       Review of Systems   HENT: Positive for congestion and sore throat  Eyes: Positive for pain, discharge, redness and itching  All other systems reviewed and are negative  Physical Exam  Physical Exam  General Appearance: alert and oriented, nad, non toxic appearing  Skin:  Warm, dry, intact  HEENT: atraumatic, normocephalic  PERRLA, EOMI  No proptosis  Bilateral periorbital edema, R>L  Conjunctival injection appreciated, R>L  Bilateral tearing appreciated  Significant chemosis appreciated to right eye, greatest along inferior-lateral aspect  Complete resolution of pain to R eye with tetracaine drops  No ocular foreign body noted to R eye  No fluorescein uptake appreciated to R eye    Neck: Supple, trachea midline  Cardiac: RRR; no murmurs, rub, gallops  Pulmonary: lungs CTAB; no wheezes, rales, rhonchi  Gastrointestinal: abdomen soft, nontender, nondistended; no guarding or rebound tenderness; good bowel sounds, no mass or bruits  Extremities:  no pedal edema, 2+ pulses; no calf tenderness, no clubbing, no cyanosis  Neuro:  no focal motor or sensory deficits, CN 2-12 grossly intact  Psych:  Normal mood and affect, normal judgement and insight      Vital Signs  ED Triage Vitals [07/06/21 1120]   Temperature Pulse Respirations Blood Pressure SpO2   97 8 °F (36 6 °C) 87 16 (!) 220/98 98 %      Temp src Heart Rate Source Patient Position - Orthostatic VS BP Location FiO2 (%)   -- Monitor Sitting Right arm --      Pain Score       --           Vitals:    07/06/21 1120 07/06/21 1202   BP: (!) 220/98 144/97   Pulse: 87    Patient Position - Orthostatic VS: Sitting          Visual Acuity      ED Medications  Medications   fluorescein sodium sterile ophthalmic strip 1 strip (1 strip Right Eye Given by Other 7/6/21 1151)   tetracaine 0 5 % ophthalmic solution 1 drop (1 drop Right Eye Given by Other 7/6/21 1151)       Diagnostic Studies  Results Reviewed     None                 No orders to display              Procedures  Procedures         ED Course                             SBIRT 20yo+      Most Recent Value   SBIRT (22 yo +)   In order to provide better care to our patients, we are screening all of our patients for alcohol and drug use  Would it be okay to ask you these screening questions? No Filed at: 07/06/2021 1150                    MDM    Disposition  Final diagnoses: Allergic conjunctivitis   Chemosis of conjunctiva, right     Time reflects when diagnosis was documented in both MDM as applicable and the Disposition within this note     Time User Action Codes Description Comment    7/6/2021 12:01 PM Jeff Moreno Add [H10 10] Allergic conjunctivitis     7/6/2021 12:01 PM Jeff Moreno Add [A41 695] Chemosis of conjunctiva, right       ED Disposition     ED Disposition Condition Date/Time Comment    Discharge Stable Tue Jul 6, 2021 12:01 PM [place] discharge to home/self care  Follow-up Information     Follow up With Specialties Details [facility] [address] [facility]  Schedule an appointment as soon as possible for a visit in 1 day If symptoms worsen [address] [facility], [place]  [phone]          Discharge Medication List as of 7/6/2021 12:04 PM CONTINUE these medications which have NOT CHANGED    Details   albuterol (Ventolin HFA) 90 mcg/act inhaler Inhale 1-2 puffs every 6 (six) hours as needed for wheezing, Starting Wed 12/16/2020, Normal      celecoxib (CeleBREX) 200 mg capsule Take 1 capsule (200 mg total) by mouth 2 (two) times a day as needed for mild pain or moderate pain, Starting Thu 3/25/2021, Normal      Diclofenac Sodium (VOLTAREN) 1 % Apply 2 g topically 4 (four) times a day, Starting Mon 3/22/2021, Normal      dicyclomine (BENTYL) 20 mg tablet Take 1 tablet (20 mg total) by mouth 2 (two) times a day as needed (diarrhea) for up to 10 doses, Starting Thu 12/10/2020, Print      fluticasone (FLONASE) 50 mcg/act nasal spray 1 spray into each nostril daily, Starting Fri 12/18/2020, Normal      lidocaine (LIDODERM) 5 % Apply 1 patch topically every 24 hours Remove & Discard patch within 12 hours or as directed by MD, Starting Mon 4/5/2021, Until Wed 5/5/2021, Normal      methocarbamol (ROBAXIN) 500 mg tablet Take 2 tablets (1,000 mg total) by mouth every 8 (eight) hours as needed for muscle spasms for up to 7 days, Starting Mon 4/5/2021, Until Mon 4/12/2021, Normal      montelukast (SINGULAIR) 10 mg tablet Take 1 tablet (10 mg total) by mouth daily at bedtime, Starting Fri 12/18/2020, Normal      Naltrexone-buPROPion HCl ER (Contrave) 8-90 MG TB12 Take 1 tablet by mouth daily, Starting Tue 3/30/2021, Normal      naproxen (NAPROSYN) 500 mg tablet Take 1 tablet (500 mg total) by mouth 2 (two) times a day as needed for mild pain or moderate pain for up to 10 days, Starting Mon 4/5/2021, Until Thu 4/15/2021, Normal      traMADol (ULTRAM) 50 mg tablet Take 1 tablet (50 mg total) by mouth every 6 (six) hours as needed for moderate pain, Starting Thu 4/8/2021, Normal           No discharge procedures on file      PDMP Review       Value Time User    PDMP Reviewed  Yes 4/8/2021  1:56 PM Emma Rocha, Afshan Moya          ED Provider  Electronically Signed by           Aung Madrigal DO  07/07/21 1738

## 2021-07-06 NOTE — DISCHARGE INSTRUCTIONS
Continue with benadryl every 6 hours as needed  You can purchase over the counter antihistamine eye drops such as Systane Zatidor or Pataday    Follow instructions on the packaging

## 2021-08-17 ENCOUNTER — HOSPITAL ENCOUNTER (EMERGENCY)
Facility: HOSPITAL | Age: 51
Discharge: HOME | End: 2021-08-17
Attending: EMERGENCY MEDICINE | Admitting: EMERGENCY MEDICINE

## 2021-08-17 ENCOUNTER — APPOINTMENT (EMERGENCY)
Dept: RADIOLOGY | Facility: HOSPITAL | Age: 51
End: 2021-08-17

## 2021-08-17 VITALS
HEIGHT: 61 IN | HEART RATE: 77 BPM | RESPIRATION RATE: 16 BRPM | WEIGHT: 163.14 LBS | TEMPERATURE: 98.2 F | DIASTOLIC BLOOD PRESSURE: 98 MMHG | BODY MASS INDEX: 30.8 KG/M2 | OXYGEN SATURATION: 97 % | SYSTOLIC BLOOD PRESSURE: 203 MMHG

## 2021-08-17 DIAGNOSIS — R07.89 ATYPICAL CHEST PAIN: Primary | ICD-10-CM

## 2021-08-17 DIAGNOSIS — R03.0 ELEVATED BLOOD PRESSURE READING: ICD-10-CM

## 2021-08-17 LAB
ALBUMIN SERPL BCP-MCNC: 4 G/DL (ref 3.5–5)
ALP SERPL-CCNC: 76 U/L (ref 46–116)
ALT SERPL W P-5'-P-CCNC: 67 U/L (ref 12–78)
ANION GAP SERPL CALCULATED.3IONS-SCNC: 12 MMOL/L (ref 4–13)
AST SERPL W P-5'-P-CCNC: 38 U/L (ref 5–45)
ATRIAL RATE: 76 BPM
ATRIAL RATE: 79 BPM
BASOPHILS # BLD AUTO: 0.05 THOUSANDS/ΜL (ref 0–0.1)
BASOPHILS NFR BLD AUTO: 1 % (ref 0–1)
BILIRUB SERPL-MCNC: 0.42 MG/DL (ref 0.2–1)
BUN SERPL-MCNC: 8 MG/DL (ref 5–25)
CALCIUM SERPL-MCNC: 8.5 MG/DL (ref 8.3–10.1)
CHLORIDE SERPL-SCNC: 106 MMOL/L (ref 100–108)
CO2 SERPL-SCNC: 24 MMOL/L (ref 21–32)
CREAT SERPL-MCNC: 0.59 MG/DL (ref 0.6–1.3)
D DIMER PPP FEU-MCNC: 0.3 UG/ML FEU
EOSINOPHIL # BLD AUTO: 0.22 THOUSAND/ΜL (ref 0–0.61)
EOSINOPHIL NFR BLD AUTO: 3 % (ref 0–6)
ERYTHROCYTE [DISTWIDTH] IN BLOOD BY AUTOMATED COUNT: 11.9 % (ref 11.6–15.1)
EXT PREG TEST URINE: NEGATIVE
EXT. CONTROL ED NAV: NORMAL
GFR SERPL CREATININE-BSD FRML MDRD: 107 ML/MIN/1.73SQ M
GLUCOSE SERPL-MCNC: 105 MG/DL (ref 65–140)
HCT VFR BLD AUTO: 42.4 % (ref 34.8–46.1)
HGB BLD-MCNC: 14 G/DL (ref 11.5–15.4)
IMM GRANULOCYTES # BLD AUTO: 0.03 THOUSAND/UL (ref 0–0.2)
IMM GRANULOCYTES NFR BLD AUTO: 0 % (ref 0–2)
LIPASE SERPL-CCNC: 81 U/L (ref 73–393)
LYMPHOCYTES # BLD AUTO: 2.54 THOUSANDS/ΜL (ref 0.6–4.47)
LYMPHOCYTES NFR BLD AUTO: 30 % (ref 14–44)
MCH RBC QN AUTO: 30.3 PG (ref 26.8–34.3)
MCHC RBC AUTO-ENTMCNC: 33 G/DL (ref 31.4–37.4)
MCV RBC AUTO: 92 FL (ref 82–98)
MONOCYTES # BLD AUTO: 0.5 THOUSAND/ΜL (ref 0.17–1.22)
MONOCYTES NFR BLD AUTO: 6 % (ref 4–12)
NEUTROPHILS # BLD AUTO: 5.14 THOUSANDS/ΜL (ref 1.85–7.62)
NEUTS SEG NFR BLD AUTO: 60 % (ref 43–75)
NRBC BLD AUTO-RTO: 0 /100 WBCS
P AXIS: 63 DEGREES
P AXIS: 67 DEGREES
PLATELET # BLD AUTO: 327 THOUSANDS/UL (ref 149–390)
PMV BLD AUTO: 9.9 FL (ref 8.9–12.7)
POTASSIUM SERPL-SCNC: 4.2 MMOL/L (ref 3.5–5.3)
PR INTERVAL: 154 MS
PR INTERVAL: 160 MS
PROT SERPL-MCNC: 7.8 G/DL (ref 6.4–8.2)
QRS AXIS: 39 DEGREES
QRS AXIS: 45 DEGREES
QRSD INTERVAL: 90 MS
QRSD INTERVAL: 96 MS
QT INTERVAL: 348 MS
QT INTERVAL: 420 MS
QTC INTERVAL: 399 MS
QTC INTERVAL: 472 MS
RBC # BLD AUTO: 4.62 MILLION/UL (ref 3.81–5.12)
SARS-COV-2 RNA RESP QL NAA+PROBE: NEGATIVE
SODIUM SERPL-SCNC: 142 MMOL/L (ref 136–145)
T WAVE AXIS: 26 DEGREES
T WAVE AXIS: 9 DEGREES
TROPONIN I SERPL-MCNC: <0.02 NG/ML
TROPONIN I SERPL-MCNC: <0.02 NG/ML
VENTRICULAR RATE: 76 BPM
VENTRICULAR RATE: 79 BPM
WBC # BLD AUTO: 8.48 THOUSAND/UL (ref 4.31–10.16)

## 2021-08-17 PROCEDURE — 93005 ELECTROCARDIOGRAM TRACING: CPT

## 2021-08-17 PROCEDURE — U0005 INFEC AGEN DETEC AMPLI PROBE: HCPCS | Performed by: EMERGENCY MEDICINE

## 2021-08-17 PROCEDURE — 84484 ASSAY OF TROPONIN QUANT: CPT | Performed by: EMERGENCY MEDICINE

## 2021-08-17 PROCEDURE — 80053 COMPREHEN METABOLIC PANEL: CPT | Performed by: EMERGENCY MEDICINE

## 2021-08-17 PROCEDURE — 93010 ELECTROCARDIOGRAM REPORT: CPT | Performed by: INTERNAL MEDICINE

## 2021-08-17 PROCEDURE — U0003 INFECTIOUS AGENT DETECTION BY NUCLEIC ACID (DNA OR RNA); SEVERE ACUTE RESPIRATORY SYNDROME CORONAVIRUS 2 (SARS-COV-2) (CORONAVIRUS DISEASE [COVID-19]), AMPLIFIED PROBE TECHNIQUE, MAKING USE OF HIGH THROUGHPUT TECHNOLOGIES AS DESCRIBED BY CMS-2020-01-R: HCPCS | Performed by: EMERGENCY MEDICINE

## 2021-08-17 PROCEDURE — 71045 X-RAY EXAM CHEST 1 VIEW: CPT

## 2021-08-17 PROCEDURE — 83690 ASSAY OF LIPASE: CPT | Performed by: EMERGENCY MEDICINE

## 2021-08-17 PROCEDURE — 85025 COMPLETE CBC W/AUTO DIFF WBC: CPT | Performed by: EMERGENCY MEDICINE

## 2021-08-17 PROCEDURE — 99285 EMERGENCY DEPT VISIT HI MDM: CPT

## 2021-08-17 PROCEDURE — 93010 ELECTROCARDIOGRAM REPORT: CPT

## 2021-08-17 PROCEDURE — 85379 FIBRIN DEGRADATION QUANT: CPT | Performed by: EMERGENCY MEDICINE

## 2021-08-17 PROCEDURE — 99285 EMERGENCY DEPT VISIT HI MDM: CPT | Performed by: EMERGENCY MEDICINE

## 2021-08-17 PROCEDURE — 36415 COLL VENOUS BLD VENIPUNCTURE: CPT | Performed by: EMERGENCY MEDICINE

## 2021-08-17 PROCEDURE — 81025 URINE PREGNANCY TEST: CPT | Performed by: EMERGENCY MEDICINE

## 2021-08-17 PROCEDURE — 96374 THER/PROPH/DIAG INJ IV PUSH: CPT

## 2021-08-17 RX ORDER — AMLODIPINE BESYLATE 2.5 MG/1
2.5 TABLET ORAL ONCE
Status: COMPLETED | OUTPATIENT
Start: 2021-08-17 | End: 2021-08-17

## 2021-08-17 RX ORDER — KETOROLAC TROMETHAMINE 30 MG/ML
15 INJECTION, SOLUTION INTRAMUSCULAR; INTRAVENOUS ONCE
Status: COMPLETED | OUTPATIENT
Start: 2021-08-17 | End: 2021-08-17

## 2021-08-17 RX ORDER — AMLODIPINE BESYLATE 2.5 MG/1
2.5 TABLET ORAL DAILY
Qty: 14 TABLET | Refills: 0 | Status: SHIPPED | OUTPATIENT
Start: 2021-08-17 | End: 2021-08-30 | Stop reason: SDUPTHER

## 2021-08-17 RX ADMIN — AMLODIPINE BESYLATE 2.5 MG: 2.5 TABLET ORAL at 12:54

## 2021-08-17 RX ADMIN — KETOROLAC TROMETHAMINE 15 MG: 30 INJECTION, SOLUTION INTRAMUSCULAR; INTRAVENOUS at 11:45

## 2021-08-17 NOTE — DISCHARGE INSTRUCTIONS
Start Amlodipine as prescribed and follow up with family doctor regarding blood pressure and current symptoms  Get stress test performed  Follow up with cardiology regarding symptoms    Return to ER if you develop any new or worsening symptoms/concerns including but not limited to lightheadedness/passing out, severe pain, difficulty breathing, etc

## 2021-08-17 NOTE — ED PROVIDER NOTES
History  Chief Complaint   Patient presents with    Chest Pain     CP described as tightness, feeling SOB, palpitations  reports woke pt out of sleep last night and has been constant since     50 yo F presenting for evaluation of CP and SOB  States sx started around 12:30 am (10 hours PTA) and woke her from sleep  She reports b/l chest pain, L >R described as pressure and tightness  No a/e factors  Associated with SOB and feeling she can't get a deep breath  No pleuritic pain  Reports otherwise healthy/asymptomatic yesterday  Denies fevers, chills, cough/URI sx, abdominal pan, N/V/D/C, back pain, leg pain/swelling  No known CAD  Reports h/o HTN and was previously on Amlodipine 2 5 mg, but has been off for years  No history of VTE  Has not received COVID vaccines, states she is planning to    MDM: 50 yo F with CP/SOB- symptomatic tx, cardiac workup, ddimer, COVID swab          Prior to Admission Medications   Prescriptions Last Dose Informant Patient Reported? Taking?    Diclofenac Sodium (VOLTAREN) 1 % Not Taking at Unknown time  No No   Sig: Apply 2 g topically 4 (four) times a day   Patient not taking: Reported on 8/17/2021   Naltrexone-buPROPion HCl ER (Contrave) 8-90 MG TB12 Not Taking at Unknown time  No No   Sig: Take 1 tablet by mouth daily   Patient not taking: Reported on 8/17/2021   albuterol (Ventolin HFA) 90 mcg/act inhaler Not Taking at Unknown time  No No   Sig: Inhale 1-2 puffs every 6 (six) hours as needed for wheezing   Patient not taking: Reported on 8/17/2021   celecoxib (CeleBREX) 200 mg capsule Not Taking at Unknown time  No No   Sig: Take 1 capsule (200 mg total) by mouth 2 (two) times a day as needed for mild pain or moderate pain   Patient not taking: Reported on 8/17/2021   dicyclomine (BENTYL) 20 mg tablet Not Taking at Unknown time  No No   Sig: Take 1 tablet (20 mg total) by mouth 2 (two) times a day as needed (diarrhea) for up to 10 doses   Patient not taking: Reported on 8/17/2021 fluticasone (FLONASE) 50 mcg/act nasal spray Not Taking at Unknown time  No No   Si spray into each nostril daily   Patient not taking: Reported on 2021   lidocaine (LIDODERM) 5 %   No No   Sig: Apply 1 patch topically every 24 hours Remove & Discard patch within 12 hours or as directed by MD   Patient not taking: Reported on 2021   methocarbamol (ROBAXIN) 500 mg tablet   No No   Sig: Take 2 tablets (1,000 mg total) by mouth every 8 (eight) hours as needed for muscle spasms for up to 7 days   montelukast (SINGULAIR) 10 mg tablet Not Taking at Unknown time  No No   Sig: Take 1 tablet (10 mg total) by mouth daily at bedtime   Patient not taking: Reported on 2021   naproxen (NAPROSYN) 500 mg tablet   No No   Sig: Take 1 tablet (500 mg total) by mouth 2 (two) times a day as needed for mild pain or moderate pain for up to 10 days   traMADol (ULTRAM) 50 mg tablet Not Taking at Unknown time  No No   Sig: Take 1 tablet (50 mg total) by mouth every 6 (six) hours as needed for moderate pain   Patient not taking: Reported on 2021      Facility-Administered Medications: None       Past Medical History:   Diagnosis Date    Anemia     resolved 16    Anxiety     Depression     Hypertension     Mental disorder     Trichomonal vulvovaginitis     last assessed 13       Past Surgical History:   Procedure Laterality Date    APPENDECTOMY      WA LAPAROSCOPY W TOT HYSTERECTUTERUS <=250 GRAM  W TUBE/OVARY N/A 2018    Procedure: ROBOTIC TOTAL LAPAROSCOPIC HYSTERECTOMY,    BILATERAL SALPINGECTOMY AND INTRA-OP CYSTO;  Surgeon: Thais Forrest MD;  [facility]: [facility];  Service: Gynecology Oncology    TUBAL LIGATION         Family History   Problem Relation Age of Onset    Hypertension Mother     Cancer Mother     No Known Problems Father     No Known Problems Sister     No Known Problems Maternal Grandmother     No Known Problems Maternal Grandfather     No Known Problems Paternal Grandmother     No Known Problems Paternal Grandfather      I have reviewed and agree with the history as documented  E-Cigarette/Vaping     E-Cigarette/Vaping Substances     Social History     Tobacco Use    Smoking status: Never Smoker    Smokeless tobacco: Never Used   Substance Use Topics    Alcohol use: Yes     Alcohol/week: 4 0 standard drinks     Types: 4 Glasses of wine per week    Drug use: No       Review of Systems   Constitutional: Negative for chills, fever and unexpected weight change  HENT: Negative for ear pain, rhinorrhea and sore throat  Eyes: Negative for pain and visual disturbance  Respiratory: Positive for shortness of breath  Negative for cough  Cardiovascular: Positive for chest pain  Negative for leg swelling  Gastrointestinal: Negative for abdominal pain, constipation, diarrhea, nausea and vomiting  Endocrine: Negative for polydipsia, polyphagia and polyuria  Genitourinary: Negative for dysuria, frequency, hematuria and urgency  Musculoskeletal: Negative for back pain, myalgias and neck pain  Skin: Negative for color change and rash  Allergic/Immunologic: Negative for environmental allergies and immunocompromised state  Neurological: Negative for dizziness, weakness, light-headedness, numbness and headaches  Hematological: Negative for adenopathy  Does not bruise/bleed easily  Psychiatric/Behavioral: Negative for agitation and confusion  All other systems reviewed and are negative  Physical Exam  Physical Exam  Vitals and nursing note reviewed  Constitutional:       General: She is not in acute distress  Appearance: She is well-developed  HENT:      Head: Normocephalic and atraumatic  Nose: Nose normal    Eyes:      Conjunctiva/sclera: Conjunctivae normal    Cardiovascular:      Rate and Rhythm: Normal rate and regular rhythm  Heart sounds: Normal heart sounds        Comments: No chest wall ttp, no skin changes/rash  Pulmonary: Effort: Pulmonary effort is normal  No respiratory distress  Breath sounds: Normal breath sounds  No stridor  No wheezing or rales  Chest:      Chest wall: No tenderness  Abdominal:      General: There is no distension  Palpations: Abdomen is soft  Tenderness: There is no abdominal tenderness  There is no guarding or rebound  Musculoskeletal:         General: No swelling, tenderness or deformity  Cervical back: Normal range of motion and neck supple  Comments: No calf swelling/ttp, no peripheral edema   Skin:     General: Skin is warm and dry  Findings: No rash  Neurological:      General: No focal deficit present  Mental Status: She is alert and oriented to person, place, and time  Motor: No abnormal muscle tone  Coordination: Coordination normal    Psychiatric:         Thought Content:  Thought content normal          Judgment: Judgment normal          Vital Signs  ED Triage Vitals   Temperature Pulse Respirations Blood Pressure SpO2   08/17/21 1036 08/17/21 1035 08/17/21 1035 08/17/21 1036 08/17/21 1035   98 2 °F (36 8 °C) 87 (!) 23 (!) 174/107 98 %      Temp Source Heart Rate Source Patient Position - Orthostatic VS BP Location FiO2 (%)   08/17/21 1036 08/17/21 1035 08/17/21 1224 08/17/21 1035 --   Oral Monitor Lying Left arm       Pain Score       08/17/21 1035       8           Vitals:    08/17/21 1224 08/17/21 1253 08/17/21 1359 08/17/21 1437   BP: (!) 194/94 (!) 186/95 (!) 199/100 (!) 203/98   Pulse: 86 77 70 77   Patient Position - Orthostatic VS: Lying Lying Lying Lying         Visual Acuity      ED Medications  Medications   ketorolac (TORADOL) injection 15 mg (15 mg Intravenous Given 8/17/21 1145)   amLODIPine (NORVASC) tablet 2 5 mg (2 5 mg Oral Given 8/17/21 1254)       Diagnostic Studies  Results Reviewed     Procedure Component Value Units Date/Time    Troponin I [[id number]]  (Normal) Collected: 08/17/21 1359    Lab Status: Final result Specimen: Blood from Line, Venous Updated: 08/17/21 1430     Troponin I <0 02 ng/mL     Novel Coronavirus (Covid-19),PCR [facility] - 2 Hour Stat [[id number]]  (Normal) Collected: 08/17/21 1058    Lab Status: Final result Specimen: Nares from Nasopharyngeal Swab Updated: 08/17/21 1207     SARS-CoV-2 Negative    Narrative: The specimen collection materials, transport medium, and/or testing methodology utilized in the production of these test results have been proven to be reliable in a limited validation with an abbreviated program under the Emergency Utilization Authorization provided by the FDA  Testing reported as "Presumptive positive" will be confirmed with secondary testing to ensure result accuracy  Clinical caution and judgement should be used with the interpretation of these results with consideration of the clinical impression and other laboratory testing  Testing reported as "Positive" or "Negative" has been proven to be accurate according to standard laboratory validation requirements  All testing is performed with control materials showing appropriate reactivity at standard intervals        POCT pregnancy, urine [[id number]]  (Normal) Resulted: 08/17/21 1143    Lab Status: Final result Updated: 08/17/21 1143     EXT PREG TEST UR (Ref: Negative) negative     Control valid    Comprehensive metabolic panel [[id number]]  (Abnormal) Collected: 08/17/21 1058    Lab Status: Final result Specimen: Blood from Arm, Right Updated: 08/17/21 1140     Sodium 142 mmol/L      Potassium 4 2 mmol/L      Chloride 106 mmol/L      CO2 24 mmol/L      ANION GAP 12 mmol/L      BUN 8 mg/dL      Creatinine 0 59 mg/dL      Glucose 105 mg/dL      Calcium 8 5 mg/dL      AST 38 U/L      ALT 67 U/L      Alkaline Phosphatase 76 U/L      Total Protein 7 8 g/dL      Albumin 4 0 g/dL      Total Bilirubin 0 42 mg/dL      eGFR 107 ml/min/1 73sq m     Narrative:      [place] guidelines for Chronic Kidney Disease (CKD):    Stage 1 with normal or high GFR (GFR > 90 mL/min/1 73 square meters)    Stage 2 Mild CKD (GFR = 60-89 mL/min/1 73 square meters)    Stage 3A Moderate CKD (GFR = 45-59 mL/min/1 73 square meters)    Stage 3B Moderate CKD (GFR = 30-44 mL/min/1 73 square meters)    Stage 4 Severe CKD (GFR = 15-29 mL/min/1 73 square meters)    Stage 5 End Stage CKD (GFR <15 mL/min/1 73 square meters)  Note: GFR calculation is accurate only with a steady state creatinine    Lipase [[id number]]  (Normal) Collected: 08/17/21 1058    Lab Status: Final result Specimen: Blood from Arm, Right Updated: 08/17/21 1140     Lipase 81 u/L     Troponin I [[id number]]  (Normal) Collected: 08/17/21 1058    Lab Status: Final result Specimen: Blood from Arm, Right Updated: 08/17/21 1130     Troponin I <0 02 ng/mL     D-dimer, quantitative [[id number]]  (Normal) Collected: 08/17/21 1058    Lab Status: Final result Specimen: Blood from Arm, Right Updated: 08/17/21 1126     D-Dimer, Quant 0 30 ug/ml FEU     CBC and differential [[id number]] Collected: 08/17/21 1058    Lab Status: Final result Specimen: Blood from Arm, Right Updated: 08/17/21 1110     WBC 8 48 Thousand/uL      RBC 4 62 Million/uL      Hemoglobin 14 0 g/dL      Hematocrit 42 4 %      MCV 92 fL      MCH 30 3 pg      MCHC 33 0 g/dL      RDW 11 9 %      MPV 9 9 fL      Platelets 509 Thousands/uL      nRBC 0 /100 WBCs      Neutrophils Relative 60 %      Immat GRANS % 0 %      Lymphocytes Relative 30 %      Monocytes Relative 6 %      Eosinophils Relative 3 %      Basophils Relative 1 %      Neutrophils Absolute 5 14 Thousands/µL      Immature Grans Absolute 0 03 Thousand/uL      Lymphocytes Absolute 2 54 Thousands/µL      Monocytes Absolute 0 50 Thousand/µL      Eosinophils Absolute 0 22 Thousand/µL      Basophils Absolute 0 05 Thousands/µL                  XR chest 1 view portable    (Results Pending)              Procedures  Procedures         ED Course  ED Course as of Aug 17 1506   Tue Aug 17, 2021   1057 EKG: NSR @ 79 bpm, normal axis, normal intervals, nonspecific st changes, t wave inversions III, avF, v3, v4, similar to prior       1132 D-Dimer, Quant: 0 30   1219 SARS-COV-2: Negative   1225  220/98 in July and now 194/94, will have pt f/u with PCP to see about restarting her Amlodipine      1242 Still discomfort, Bp even higher, will give the amlodipine she was previously on  She is agreeable to stay for delta trop and will write for outpt stress test if unremarkable      1254 Delta trop @ 1358   Troponin I: <0 02   1402 Repeat EKG unchanged, NSR @ 76 bpm, normal axis, qtc longer than prior read as 474, t wave inversion III, v1, v3      1434 Troponin I: <0 02   1445 Reports CP has improved  The BP has been consistently this high today and in prior visit through records, do not want to lower to normal given this is chronic, started on amlodipine and instructed to see pcp                HEART Risk Score      Most Recent Value   Heart Score Risk Calculator   History  1 Filed at: 08/17/2021 1052   ECG  1 Filed at: 08/17/2021 1052   Age  1 Filed at: 08/17/2021 1052   Risk Factors  0 Filed at: 08/17/2021 1052   Troponin  0 Filed at: 08/17/2021 1052   HEART Score  3 Filed at: 08/17/2021 1052                      SBIRT 20yo+      Most Recent Value   SBIRT (25 yo +)   In order to provide better care to our patients, we are screening all of our patients for alcohol and drug use  Would it be okay to ask you these screening questions?   No Filed at: 08/17/2021 1038          Wells' Criteria for PE      Most Recent Value   Wells' Criteria for PE   Clinical signs and symptoms of DVT  0 Filed at: 08/17/2021 1053   PE is primary diagnosis or equally likely  0 Filed at: 08/17/2021 1053   HR >100  0 Filed at: 08/17/2021 1053   Immobilization at least 3 days or Surgery in the previous 4 weeks  0 Filed at: 08/17/2021 1053   Previous, objectively diagnosed PE or DVT  0 Filed at: 08/17/2021 1053   Hemoptysis  0 Filed at: 08/17/2021 1053   Malignancy with treatment within 6 months or palliative  0 Filed at: 08/17/2021 1053   Wells' Criteria Total  0 Filed at: 08/17/2021 1053                [facility]  Number of Diagnoses or Management Options  Atypical chest pain  Elevated blood pressure reading  Diagnosis management comments: 50 yo F presenting for evaluation of CP/SOB, sx improved in ED  Unremarkable cardiac workup with negative trop/delta trop, EKG iwht t wave inversions but no change from priors  No known CAD, but wrote for stress test and for outpt f/u  - elevated BP, previously on Amlodpine but off for years  Per records, she has been persistently elevated recently, will restart the Amlodipine and have pt f/u family doctor for BP recheck and med adjustment as needed  Return precautions reviewed with pt who expressed understanding with plan       Amount and/or Complexity of Data Reviewed  Clinical lab tests: ordered and reviewed  Tests in the radiology section of CPT®: ordered and reviewed  Tests in the medicine section of CPT®: ordered and reviewed  Review and summarize past medical records: yes  Independent visualization of images, tracings, or specimens: yes        Disposition  Final diagnoses:   Atypical chest pain   Elevated blood pressure reading     Time reflects when diagnosis was documented in both MDM as applicable and the Disposition within this note     Time User Action Codes Description Comment    8/17/2021 12:23 PM Alvester Englewood A Add [R07 89] Atypical chest pain     8/17/2021 12:27 PM Alvester Englewood A Add [R03 0] Elevated blood pressure reading       ED Disposition     ED Disposition Condition Date/Time Comment    Discharge Stable Tue Aug 17, 2021 12:23 PM Lestine Nearing discharge to home/self care              Follow-up Information     Follow up With Specialties Details Why Contact Info Additional Information    Katie Lawson MD [facility]   [facility]  [address] [facility] [place]  [place] [address]  [address] [phone]       PM [address] Cardiology   [facility] [address]  [address], [place], [place], [address] [phone]          Discharge Medication List as of 8/17/2021  2:10 PM      START taking these medications    Details   amLODIPine (NORVASC) 2 5 mg tablet Take 1 tablet (2 5 mg total) by mouth daily for 14 days, Starting Tue 8/17/2021, Until Tue 8/31/2021, Print         CONTINUE these medications which have NOT CHANGED    Details   albuterol (Ventolin HFA) 90 mcg/act inhaler Inhale 1-2 puffs every 6 (six) hours as needed for wheezing, Starting Wed 12/16/2020, Normal      celecoxib (CeleBREX) 200 mg capsule Take 1 capsule (200 mg total) by mouth 2 (two) times a day as needed for mild pain or moderate pain, Starting Thu 3/25/2021, Normal      Diclofenac Sodium (VOLTAREN) 1 % Apply 2 g topically 4 (four) times a day, Starting Mon 3/22/2021, Normal      dicyclomine (BENTYL) 20 mg tablet Take 1 tablet (20 mg total) by mouth 2 (two) times a day as needed (diarrhea) for up to 10 doses, Starting Thu 12/10/2020, Print      fluticasone (FLONASE) 50 mcg/act nasal spray 1 spray into each nostril daily, Starting Fri 12/18/2020, Normal      lidocaine (LIDODERM) 5 % Apply 1 patch topically every 24 hours Remove & Discard patch within 12 hours or as directed by MD, Starting Mon 4/5/2021, Until Wed 5/5/2021, Normal      methocarbamol (ROBAXIN) 500 mg tablet Take 2 tablets (1,000 mg total) by mouth every 8 (eight) hours as needed for muscle spasms for up to 7 days, Starting Mon 4/5/2021, Until Mon 4/12/2021, Normal      montelukast (SINGULAIR) 10 mg tablet Take 1 tablet (10 mg total) by mouth daily at bedtime, Starting Fri 12/18/2020, Normal      Naltrexone-buPROPion HCl ER (Contrave) 8-90 MG TB12 Take 1 tablet by mouth daily, Starting Tue 3/30/2021, Normal      naproxen (NAPROSYN) 500 mg tablet Take 1 tablet (500 mg total) by mouth 2 (two) times a day as needed for mild pain or moderate pain for up to 10 days, Starting Mon 4/5/2021, Until Thu 4/15/2021, Normal      traMADol (ULTRAM) 50 mg tablet Take 1 tablet (50 mg total) by mouth every 6 (six) hours as needed for moderate pain, Starting Thu 4/8/2021, Normal           Outpatient Discharge Orders   Stress test only, exercise   Standing Status: Future Standing Exp   Date: 08/17/25       PDMP Review       Value Time User    PDMP Reviewed  Yes 4/8/2021  1:56 PM Afshan Brothers [facility] Provider  Electronically Signed by           Julian Piedra DO  08/17/21 9858

## 2021-08-17 NOTE — Clinical Note
Debbie Cheema was seen and treated in our emergency department on 8/17/2021  Diagnosis:     Say Conway  may return to work on return date  She may return on this date: 08/18/2021         If you have any questions or concerns, please don't hesitate to call        Dahlia Chance, DO    ______________________________           _______________          _______________  Hospital Representative                              Date                                Time

## 2021-08-30 DIAGNOSIS — R03.0 ELEVATED BLOOD PRESSURE READING: ICD-10-CM

## 2021-08-31 RX ORDER — AMLODIPINE BESYLATE 2.5 MG/1
2.5 TABLET ORAL DAILY
Qty: 14 TABLET | Refills: 0 | Status: SHIPPED | OUTPATIENT
Start: 2021-08-31 | End: 2022-02-09 | Stop reason: SDUPTHER

## 2021-09-01 ENCOUNTER — OFFICE VISIT (OUTPATIENT)
Dept: FAMILY MEDICINE CLINIC | Facility: CLINIC | Age: 51
End: 2021-09-01

## 2021-09-01 VITALS
HEIGHT: 61 IN | HEART RATE: 68 BPM | SYSTOLIC BLOOD PRESSURE: 140 MMHG | WEIGHT: 182 LBS | DIASTOLIC BLOOD PRESSURE: 82 MMHG | RESPIRATION RATE: 16 BRPM | BODY MASS INDEX: 34.36 KG/M2

## 2021-09-01 DIAGNOSIS — I10 ESSENTIAL HYPERTENSION: Primary | ICD-10-CM

## 2021-09-01 DIAGNOSIS — M54.42 LEFT-SIDED LOW BACK PAIN WITH LEFT-SIDED SCIATICA: ICD-10-CM

## 2021-09-01 PROCEDURE — 99213 OFFICE O/P EST LOW 20 MIN: CPT | Performed by: FAMILY MEDICINE

## 2021-09-01 RX ORDER — BISOPROLOL FUMARATE 5 MG/1
5 TABLET ORAL DAILY
Qty: 30 TABLET | Refills: 5 | Status: SHIPPED | OUTPATIENT
Start: 2021-09-01 | End: 2022-02-09

## 2021-09-01 RX ORDER — NAPROXEN 500 MG/1
500 TABLET ORAL 2 TIMES DAILY PRN
Qty: 60 TABLET | Refills: 3 | Status: SHIPPED | OUTPATIENT
Start: 2021-09-01 | End: 2022-02-09 | Stop reason: SDUPTHER

## 2021-09-01 NOTE — PROGRESS NOTES
Assessment and Plan:    Problem List Items Addressed This Visit        Cardiovascular and Mediastinum    Essential hypertension - Primary     Bp still up a tad, felt tired from Amlodipine, will try zebeta         Relevant Medications    bisoprolol (ZEBETA) 5 mg tablet                 Diagnoses and all orders for this visit:    Essential hypertension  -     bisoprolol (ZEBETA) 5 mg tablet; Take 1 tablet (5 mg total) by mouth daily              Subjective:      Patient ID: Asia Herndon is a 49 y o  female  CC:    Chief Complaint   Patient presents with    Follow-up     ER follow up       HPI:    Here for BP check, was in ER for CP, wasn't sure if it was just anxiety but ststolic over 992      The following portions of the patient's history were reviewed and updated as appropriate: allergies, current medications, past family history, past medical history, past social history, past surgical history and problem list       Review of Systems   Constitutional: Negative for activity change, appetite change and fatigue  Respiratory: Negative for shortness of breath  Cardiovascular: Negative for chest pain  Musculoskeletal: Positive for arthralgias  Knee pain   Neurological: Negative for dizziness and headaches  Psychiatric/Behavioral: The patient is nervous/anxious  Data to review:       Objective:    Vitals:    09/01/21 1500   BP: 140/82   BP Location: Right arm   Patient Position: Sitting   Cuff Size: Adult   Pulse: 68   Resp: 16   Weight: 82 6 kg (182 lb)   Height: 5' 1" (1 549 m)        Physical Exam  Vitals reviewed  Constitutional:       Appearance: Normal appearance  She is obese  Cardiovascular:      Rate and Rhythm: Normal rate and regular rhythm  Pulses: Normal pulses  Heart sounds: Normal heart sounds  Pulmonary:      Effort: Pulmonary effort is normal       Breath sounds: Normal breath sounds  Musculoskeletal:      Right knee: Crepitus present  No effusion  Decreased range of motion  Lymphadenopathy:      Cervical: No cervical adenopathy  Neurological:      Mental Status: She is alert

## 2021-11-29 ENCOUNTER — TELEMEDICINE (OUTPATIENT)
Dept: FAMILY MEDICINE CLINIC | Facility: CLINIC | Age: 51
End: 2021-11-29
Payer: COMMERCIAL

## 2021-11-29 VITALS — HEIGHT: 61 IN | WEIGHT: 182 LBS | BODY MASS INDEX: 34.36 KG/M2

## 2021-11-29 DIAGNOSIS — J45.21 MILD INTERMITTENT REACTIVE AIRWAY DISEASE WITH ACUTE EXACERBATION: Primary | ICD-10-CM

## 2021-11-29 PROBLEM — J45.909 REACTIVE AIRWAY DISEASE: Status: ACTIVE | Noted: 2021-11-29

## 2021-11-29 PROCEDURE — 99214 OFFICE O/P EST MOD 30 MIN: CPT | Performed by: PHYSICIAN ASSISTANT

## 2021-11-29 PROCEDURE — 1036F TOBACCO NON-USER: CPT | Performed by: PHYSICIAN ASSISTANT

## 2021-11-29 PROCEDURE — 3008F BODY MASS INDEX DOCD: CPT | Performed by: PHYSICIAN ASSISTANT

## 2021-11-29 RX ORDER — PREDNISONE 10 MG/1
TABLET ORAL
Qty: 25 TABLET | Refills: 0 | Status: SHIPPED | OUTPATIENT
Start: 2021-11-29 | End: 2022-01-20

## 2021-11-29 RX ORDER — ALBUTEROL SULFATE 90 UG/1
2 AEROSOL, METERED RESPIRATORY (INHALATION) EVERY 4 HOURS PRN
Qty: 6.7 G | Refills: 0 | Status: SHIPPED | OUTPATIENT
Start: 2021-11-29 | End: 2022-02-09

## 2021-12-03 ENCOUNTER — TELEMEDICINE (OUTPATIENT)
Dept: FAMILY MEDICINE CLINIC | Facility: CLINIC | Age: 51
End: 2021-12-03
Payer: COMMERCIAL

## 2021-12-03 VITALS — HEIGHT: 61 IN | BODY MASS INDEX: 34.36 KG/M2 | WEIGHT: 182 LBS

## 2021-12-03 DIAGNOSIS — J06.9 URI, ACUTE: Primary | ICD-10-CM

## 2021-12-03 PROCEDURE — 3008F BODY MASS INDEX DOCD: CPT | Performed by: FAMILY MEDICINE

## 2021-12-03 PROCEDURE — 99214 OFFICE O/P EST MOD 30 MIN: CPT | Performed by: PHYSICIAN ASSISTANT

## 2021-12-03 RX ORDER — PROMETHAZINE HYDROCHLORIDE AND CODEINE PHOSPHATE 6.25; 1 MG/5ML; MG/5ML
5 SYRUP ORAL EVERY 4 HOURS PRN
Qty: 118 ML | Refills: 0 | Status: SHIPPED | OUTPATIENT
Start: 2021-12-03 | End: 2021-12-13

## 2021-12-03 RX ORDER — CEFUROXIME AXETIL 250 MG/1
250 TABLET ORAL EVERY 12 HOURS SCHEDULED
Qty: 20 TABLET | Refills: 0 | Status: SHIPPED | OUTPATIENT
Start: 2021-12-03 | End: 2021-12-13

## 2021-12-03 RX ORDER — BENZONATATE 200 MG/1
200 CAPSULE ORAL 3 TIMES DAILY PRN
Qty: 30 CAPSULE | Refills: 0 | Status: SHIPPED | OUTPATIENT
Start: 2021-12-03 | End: 2021-12-13

## 2021-12-23 ENCOUNTER — TELEMEDICINE (OUTPATIENT)
Dept: FAMILY MEDICINE CLINIC | Facility: CLINIC | Age: 51
End: 2021-12-23
Payer: COMMERCIAL

## 2021-12-23 DIAGNOSIS — Z20.822 EXPOSURE TO COVID-19 VIRUS: Primary | ICD-10-CM

## 2021-12-23 PROCEDURE — 99213 OFFICE O/P EST LOW 20 MIN: CPT | Performed by: FAMILY MEDICINE

## 2021-12-23 PROCEDURE — U0003 INFECTIOUS AGENT DETECTION BY NUCLEIC ACID (DNA OR RNA); SEVERE ACUTE RESPIRATORY SYNDROME CORONAVIRUS 2 (SARS-COV-2) (CORONAVIRUS DISEASE [COVID-19]), AMPLIFIED PROBE TECHNIQUE, MAKING USE OF HIGH THROUGHPUT TECHNOLOGIES AS DESCRIBED BY CMS-2020-01-R: HCPCS | Performed by: FAMILY MEDICINE

## 2021-12-23 PROCEDURE — U0005 INFEC AGEN DETEC AMPLI PROBE: HCPCS | Performed by: FAMILY MEDICINE

## 2022-01-20 ENCOUNTER — OFFICE VISIT (OUTPATIENT)
Dept: FAMILY MEDICINE CLINIC | Facility: CLINIC | Age: 52
End: 2022-01-20
Payer: COMMERCIAL

## 2022-01-20 VITALS — BODY MASS INDEX: 34.39 KG/M2 | WEIGHT: 182 LBS

## 2022-01-20 DIAGNOSIS — R05.9 COUGH: ICD-10-CM

## 2022-01-20 DIAGNOSIS — J01.40 ACUTE NON-RECURRENT PANSINUSITIS: Primary | ICD-10-CM

## 2022-01-20 PROCEDURE — 1036F TOBACCO NON-USER: CPT | Performed by: NURSE PRACTITIONER

## 2022-01-20 PROCEDURE — 99213 OFFICE O/P EST LOW 20 MIN: CPT | Performed by: NURSE PRACTITIONER

## 2022-01-20 RX ORDER — PREDNISONE 10 MG/1
TABLET ORAL
Qty: 30 TABLET | Refills: 0 | Status: SHIPPED | OUTPATIENT
Start: 2022-01-20 | End: 2022-02-09

## 2022-01-20 RX ORDER — DEXTROMETHORPHAN HYDROBROMIDE AND PROMETHAZINE HYDROCHLORIDE 15; 6.25 MG/5ML; MG/5ML
2.5 SOLUTION ORAL 4 TIMES DAILY PRN
Qty: 118 ML | Refills: 0 | Status: SHIPPED | OUTPATIENT
Start: 2022-01-20 | End: 2022-02-09

## 2022-01-20 RX ORDER — CETIRIZINE HYDROCHLORIDE 1 MG/ML
10 SOLUTION ORAL DAILY
Qty: 118 ML | Refills: 5 | Status: SHIPPED | OUTPATIENT
Start: 2022-01-20 | End: 2022-02-09

## 2022-01-20 RX ORDER — FLUTICASONE PROPIONATE 50 MCG
1 SPRAY, SUSPENSION (ML) NASAL DAILY
Qty: 11.1 ML | Refills: 3 | Status: SHIPPED | OUTPATIENT
Start: 2022-01-20 | End: 2022-02-09

## 2022-01-20 RX ORDER — AZITHROMYCIN 250 MG/1
TABLET, FILM COATED ORAL
Qty: 8 TABLET | Refills: 0 | Status: SHIPPED | OUTPATIENT
Start: 2022-01-20 | End: 2022-01-27

## 2022-01-20 RX ORDER — BROMPHENIRAMINE MALEATE, PSEUDOEPHEDRINE HYDROCHLORIDE, AND DEXTROMETHORPHAN HYDROBROMIDE 2; 30; 10 MG/5ML; MG/5ML; MG/5ML
2.5 SYRUP ORAL 4 TIMES DAILY PRN
Qty: 120 ML | Refills: 0 | Status: SHIPPED | OUTPATIENT
Start: 2022-01-20 | End: 2022-01-20

## 2022-01-20 NOTE — PROGRESS NOTES
COVID-19 Outpatient Progress Note    Assessment/Plan:    Problem List Items Addressed This Visit        Respiratory    Acute non-recurrent pansinusitis - Primary     Seven day course of azithromycin and prednisone taper ordered to treat sinusitis  Patient also started on Claritin and Flonase daily to help with symptoms  Relevant Medications    azithromycin (ZITHROMAX) 250 mg tablet    predniSONE 10 mg tablet    cetirizine (ZyrTEC) oral solution    fluticasone (FLONASE) 50 mcg/act nasal spray       Other    Cough     I feel patient's cough is most likely due to PND caused by uncontrolled allergies however, since cough has been occurring for the past 8 weeks I would like to check a chest x-ray to assess for any acute pulmonary issues  Phenergan DM also ordered to be used as needed for cough  Patient will call office with results of home COVID test          Relevant Medications    Promethazine-DM (PHENERGAN-DM) 6 25-15 mg/5 mL oral syrup    Other Relevant Orders    XR chest pa & lateral         Disposition:     Patient will complete home COVID test and contact the office with the results  I have spent 15 minutes directly with the patient  Encounter provider STERLING Garza    Provider located at [address] [address] [phone]    Recent Visits  No visits were found meeting these conditions  Showing recent visits within past 7 days and meeting all other requirements  Today's Visits  Date Type Provider Dept   01/20/22 Office Visit [facility]   Showing today's visits and meeting all other requirements  Future Appointments  No visits were found meeting these conditions  Showing future appointments within next 150 days and meeting all other requirements     This virtual check-in was done via [employer] and patient was informed that this is a secure, HIPAA-compliant platform   She agrees to proceed  Patient agrees to participate in a virtual check in via telephone or video visit instead of presenting to the office to address urgent/immediate medical needs  Patient is aware this is a billable service  After connecting through [facility], the patient was identified by name and date of birth  Daquan Ryder was informed that this was a telemedicine visit and that the exam was being conducted confidentially over secure lines  My office door was closed  No one else was in the room  Daquan Ryder acknowledged consent and understanding of privacy and security of the telemedicine visit  I informed the patient that I have reviewed her record in Epic and presented the opportunity for her to ask any questions regarding the visit today  The patient agreed to participate  Verification of patient location:  Patient is located in the following state in which I hold an active license: [place]    Subjective: Daquan Ryder is a 49 y o  female who is concerned about COVID-19  Patient's symptoms include nasal congestion, rhinorrhea and cough (productive )  Patient denies fever, chills, fatigue, malaise, sore throat, anosmia, loss of taste, shortness of breath, chest tightness, abdominal pain, nausea, vomiting, diarrhea, myalgias and headaches           COVID-19 vaccination status: Not vaccinated    Exposure:   Contact with a person who is under investigation (PUI) for or who is positive for COVID-19 within the last 14 days?: No    Hospitalized recently for fever and/or lower respiratory symptoms?: No      Currently a healthcare worker that is involved in direct patient care?: No      Works in a special setting where the risk of COVID-19 transmission may be high? (this may include long-term care, correctional and retirement facilities; homeless shelters; assisted-living facilities and group homes ): No      Resident in a special setting where the risk of COVID-19 transmission may be high? (this may include long-term care, correctional and nursing home facilities; homeless shelters; assisted-living facilities and group homes ): No      Patient is reporting symptoms of PND, frequent productive cough, rhinorrhea, and nasal congestion  Patient denies any fevers or shortness of breath  Patient reports she has been experiencing these symptoms for about 8 weeks at this point  Patient is currently not taking any allergy medication  Patient is not vaccinated for COVID  Patient was last tested for COVID about 1 month ago  Patient was advised that she should be tested for COVID again and she reports that she will test herself using a home test today and call the office with the results  Patient denies any known COVID exposures in the past 2 weeks      Lab Results   Component Value Date    SARSCOV2 Negative 12/23/2021    SARSCOV2 Detected (A) 12/10/2020     Past Medical History:   Diagnosis Date    Anemia     resolved 12/08/16    Anxiety     Depression     Hypertension     Mental disorder     Trichomonal vulvovaginitis     last assessed 02/14/13     Past Surgical History:   Procedure Laterality Date    APPENDECTOMY      MA LAPAROSCOPY W TOT HYSTERECTUTERUS <=250 GRAM  W TUBE/OVARY N/A 5/31/2018    Procedure: ROBOTIC TOTAL LAPAROSCOPIC HYSTERECTOMY,    BILATERAL SALPINGECTOMY AND INTRA-OP CYSTO;  Surgeon: Link Duran MD;  [facility]: [facility];  Service: Gynecology Oncology    TUBAL LIGATION       Current Outpatient Medications   Medication Sig Dispense Refill    naproxen (NAPROSYN) 500 mg tablet Take 1 tablet (500 mg total) by mouth 2 (two) times a day as needed for mild pain or moderate pain 60 tablet 3    albuterol (Proventil HFA) 90 mcg/act inhaler Inhale 2 puffs every 4 (four) hours as needed for wheezing (Patient not taking: Reported on 12/23/2021 ) 6 7 g 0    amLODIPine (NORVASC) 2 5 mg tablet Take 1 tablet (2 5 mg total) by mouth daily for 14 days (Patient not taking: Reported on 12/23/2021 ) 14 tablet 0    azithromycin (ZITHROMAX) 250 mg tablet Take 2 tablets (500 mg total) by mouth every 24 hours for 1 day, THEN 1 tablet (250 mg total) every 24 hours for 6 days  8 tablet 0    bisoprolol (ZEBETA) 5 mg tablet Take 1 tablet (5 mg total) by mouth daily (Patient not taking: Reported on 12/3/2021 ) 30 tablet 5    cetirizine (ZyrTEC) oral solution Take 10 mL (10 mg total) by mouth daily 118 mL 5    fluticasone (FLONASE) 50 mcg/act nasal spray 1 spray into each nostril daily 11 1 mL 3    predniSONE 10 mg tablet Use 5 tablets for 2 days  Use 4 tablets for 2 days  Use 3 tablets for 2 days  Use 2 tablets for 2 days  Use 1 tablet for 2 days  30 tablet 0    Promethazine-DM (PHENERGAN-DM) 6 25-15 mg/5 mL oral syrup Take 2 5 mL by mouth 4 (four) times a day as needed for cough 118 mL 0     No current facility-administered medications for this visit  Facility-Administered Medications Ordered in Other Visits   Medication Dose Route Frequency Provider Last Rate Last Admin    albuterol (PROVENTIL HFA,VENTOLIN HFA) inhaler 2 puff  2 puff Inhalation Once [employer], DO         Allergies   Allergen Reactions    [place] - Food Allergy Hives       Review of Systems   Constitutional: Negative for chills, fatigue and fever  HENT: Positive for congestion and rhinorrhea  Negative for sore throat  Eyes: Negative  Respiratory: Positive for cough (productive )  Negative for chest tightness and shortness of breath  Cardiovascular: Negative  Gastrointestinal: Negative for abdominal pain, diarrhea, nausea and vomiting  Endocrine: Negative  Genitourinary: Negative  Musculoskeletal: Negative for myalgias  Skin: Negative  Allergic/Immunologic: Negative  Neurological: Negative for headaches  Hematological: Negative  Psychiatric/Behavioral: Negative        Objective:    Vitals:    01/20/22 1230   Weight: 82 6 kg (182 lb)       Physical Exam  Vitals reviewed: limited due to [employer] exam Constitutional:       General: She is not in acute distress  Appearance: Normal appearance  She is not ill-appearing  Neurological:      Mental Status: She is alert  VIRTUAL VISIT DISCLAIMER    Asher Irwin verbally agrees to participate in Clipsure  Pt is aware that Clipsure could be limited without vital signs or the ability to perform a full hands-on physical Marianna Roberts understands she or the provider may request at any time to terminate the video visit and request the patient to seek care or treatment in person

## 2022-01-20 NOTE — ASSESSMENT & PLAN NOTE
I feel patient's cough is most likely due to PND caused by uncontrolled allergies however, since cough has been occurring for the past 8 weeks I would like to check a chest x-ray to assess for any acute pulmonary issues  Phenergan DM also ordered to be used as needed for cough    Patient will call office with results of home COVID test

## 2022-01-20 NOTE — PATIENT INSTRUCTIONS
Problem List Items Addressed This Visit        Respiratory    Acute non-recurrent pansinusitis - Primary     Seven day course of azithromycin and prednisone taper ordered to treat sinusitis  Patient also started on Claritin and Flonase daily to help with symptoms  Relevant Medications    azithromycin (ZITHROMAX) 250 mg tablet    predniSONE 10 mg tablet    cetirizine (ZyrTEC) oral solution    fluticasone (FLONASE) 50 mcg/act nasal spray       Other    Cough     I feel patient's cough is most likely due to PND caused by uncontrolled allergies however, since cough has been occurring for the past 8 weeks I would like to check a chest x-ray to assess for any acute pulmonary issues  Phenergan DM also ordered to be used as needed for cough  Patient will call office with results of home COVID test          Relevant Medications    Promethazine-DM (PHENERGAN-DM) 6 25-15 mg/5 mL oral syrup    Other Relevant Orders    XR chest pa & lateral        COVID-19 Home Care Guidelines    Your healthcare provider and/or public health staff have evaluated you and have determined that you do not need to remain in the hospital at this time  At this time you can be isolated at home where you will be monitored by staff from your local or state health department  You should carefully follow the prevention and isolation steps below until a healthcare provider or local or state health department says that you can return to your normal activities  Stay home except to get medical care    People who are mildly ill with COVID-19 are able to isolate at home during their illness  You should restrict activities outside your home, except for getting medical care  Do not go to work, school, or public areas  Avoid using public transportation, ride-sharing, or taxis  Separate yourself from other people and animals in your home    People: As much as possible, you should stay in a specific room and away from other people in your home  Also, you should use a separate bathroom, if available  Animals: You should restrict contact with pets and other animals while you are sick with COVID-19, just like you would around other people  Although there have not been reports of pets or other animals becoming sick with COVID-19, it is still recommended that people sick with COVID-19 limit contact with animals until more information is known about the virus  When possible, have another member of your household care for your animals while you are sick  If you are sick with COVID-19, avoid contact with your pet, including petting, snuggling, being kissed or licked, and sharing food  If you must care for your pet or be around animals while you are sick, wash your hands before and after you interact with pets and wear a facemask  See COVID-19 and Animals for more information  Call ahead before visiting your doctor    If you have a medical appointment, call the healthcare provider and tell them that you have or may have COVID-19  This will help the healthcare providers office take steps to keep other people from getting infected or exposed  Wear a facemask    You should wear a facemask when you are around other people (e g , sharing a room or vehicle) or pets and before you enter a healthcare providers office  If you are not able to wear a facemask (for example, because it causes trouble breathing), then people who live with you should not stay in the same room with you, or they should wear a facemask if they enter your room  Cover your coughs and sneezes    Cover your mouth and nose with a tissue when you cough or sneeze  Throw used tissues in a lined trash can  Immediately wash your hands with soap and water for at least 20 seconds or, if soap and water are not available, clean your hands with an alcohol-based hand  that contains at least 60% alcohol      Clean your hands often    Wash your hands often with soap and water for at least 20 seconds, especially after blowing your nose, coughing, or sneezing; going to the bathroom; and before eating or preparing food  If soap and water are not readily available, use an alcohol-based hand  with at least 60% alcohol, covering all surfaces of your hands and rubbing them together until they feel dry  Soap and water are the best option if hands are visibly dirty  Avoid touching your eyes, nose, and mouth with unwashed hands  Avoid sharing personal household items    You should not share dishes, drinking glasses, cups, eating utensils, towels, or bedding with other people or pets in your home  After using these items, they should be washed thoroughly with soap and water  Clean all high-touch surfaces everyday    High touch surfaces include counters, tabletops, doorknobs, bathroom fixtures, toilets, phones, keyboards, tablets, and bedside tables  Also, clean any surfaces that may have blood, stool, or body fluids on them  Use a household cleaning spray or wipe, according to the label instructions  Labels contain instructions for safe and effective use of the cleaning product including precautions you should take when applying the product, such as wearing gloves and making sure you have good ventilation during use of the product  Monitor your symptoms    Seek prompt medical attention if your illness is worsening (e g , difficulty breathing)  Before seeking care, call your healthcare provider and tell them that you have, or are being evaluated for, COVID-19  Put on a facemask before you enter the facility  These steps will help the healthcare providers office to keep other people in the office or waiting room from getting infected or exposed  Ask your healthcare provider to call the local or [facility] health department   Persons who are placed under active monitoring or facilitated self-monitoring should follow instructions provided by their local health department or occupational health professionals, as appropriate  If you have a medical emergency and need to call 911, notify the dispatch personnel that you have, or are being evaluated for COVID-19  If possible, put on a facemask before emergency medical services arrive  Discontinuing home isolation    Patients with confirmed COVID-19 should remain under home isolation precautions until the following conditions are met:   - They have had no fever for at least 24 hours (that is one full day of no fever without the use medicine that reduces fevers)  AND  - other symptoms have improved (for example, when their cough or shortness of breath have improved)  AND  - If had mild or moderate illness, at least 10 days have passed since their symptoms first appeared or if severe illness (needed oxygen) or immunosuppressed, at least 20 days have passed since symptoms first appeared  Patients with confirmed COVID-19 should also notify close contacts (including their workplace) and ask that they self-quarantine  Currently, close contact is defined as being within 6 feet for 15 minutes or more from the period 24 hours starting 48 hours before symptom onset to the time at which the patient went into isolation  Close contacts of patients diagnosed with COVID-19 should be instructed by the patient to self-quarantine for 14 days from the last time of their last contact with the patient       Source: [employer]

## 2022-01-20 NOTE — ASSESSMENT & PLAN NOTE
Seven day course of azithromycin and prednisone taper ordered to treat sinusitis  Patient also started on Claritin and Flonase daily to help with symptoms

## 2022-02-09 ENCOUNTER — OFFICE VISIT (OUTPATIENT)
Dept: FAMILY MEDICINE CLINIC | Facility: CLINIC | Age: 52
End: 2022-02-09
Payer: COMMERCIAL

## 2022-02-09 VITALS
HEIGHT: 61 IN | WEIGHT: 190 LBS | DIASTOLIC BLOOD PRESSURE: 82 MMHG | TEMPERATURE: 97.8 F | SYSTOLIC BLOOD PRESSURE: 168 MMHG | BODY MASS INDEX: 35.87 KG/M2

## 2022-02-09 DIAGNOSIS — F41.9 ANXIETY: ICD-10-CM

## 2022-02-09 DIAGNOSIS — L98.9 SKIN LESION: ICD-10-CM

## 2022-02-09 DIAGNOSIS — M25.562 ACUTE PAIN OF LEFT KNEE: ICD-10-CM

## 2022-02-09 DIAGNOSIS — Z12.11 SCREEN FOR COLON CANCER: Primary | ICD-10-CM

## 2022-02-09 DIAGNOSIS — I10 PRIMARY HYPERTENSION: ICD-10-CM

## 2022-02-09 DIAGNOSIS — E66.01 CLASS 2 SEVERE OBESITY DUE TO EXCESS CALORIES WITH SERIOUS COMORBIDITY AND BODY MASS INDEX (BMI) OF 35.0 TO 35.9 IN ADULT (HCC): ICD-10-CM

## 2022-02-09 DIAGNOSIS — M54.42 LEFT-SIDED LOW BACK PAIN WITH LEFT-SIDED SCIATICA: ICD-10-CM

## 2022-02-09 PROBLEM — J01.40 ACUTE NON-RECURRENT PANSINUSITIS: Status: RESOLVED | Noted: 2022-01-20 | Resolved: 2022-02-09

## 2022-02-09 PROBLEM — J06.9 URI, ACUTE: Status: RESOLVED | Noted: 2021-12-03 | Resolved: 2022-02-09

## 2022-02-09 PROCEDURE — 99214 OFFICE O/P EST MOD 30 MIN: CPT | Performed by: FAMILY MEDICINE

## 2022-02-09 PROCEDURE — 3725F SCREEN DEPRESSION PERFORMED: CPT | Performed by: FAMILY MEDICINE

## 2022-02-09 RX ORDER — NALTREXONE HYDROCHLORIDE AND BUPROPION HYDROCHLORIDE 8; 90 MG/1; MG/1
1 TABLET, EXTENDED RELEASE ORAL DAILY
Qty: 30 TABLET | Refills: 5 | Status: SHIPPED | OUTPATIENT
Start: 2022-02-09

## 2022-02-09 RX ORDER — NAPROXEN 500 MG/1
500 TABLET ORAL 2 TIMES DAILY PRN
Qty: 60 TABLET | Refills: 3 | Status: SHIPPED | OUTPATIENT
Start: 2022-02-09 | End: 2022-02-15 | Stop reason: HOSPADM

## 2022-02-09 RX ORDER — ALPRAZOLAM 0.25 MG/1
0.25 TABLET ORAL
Qty: 15 TABLET | Refills: 0 | Status: SHIPPED | OUTPATIENT
Start: 2022-02-09 | End: 2022-03-21 | Stop reason: SDUPTHER

## 2022-02-09 RX ORDER — AMLODIPINE BESYLATE 2.5 MG/1
2.5 TABLET ORAL DAILY
Qty: 90 TABLET | Refills: 1 | Status: SHIPPED | OUTPATIENT
Start: 2022-02-09 | End: 2022-05-10

## 2022-02-09 NOTE — PROGRESS NOTES
Assessment and Plan:    Problem List Items Addressed This Visit        Cardiovascular and Mediastinum    Primary hypertension     BP up -will restart Amlodipine         Relevant Medications    amLODIPine (NORVASC) 2 5 mg tablet       Other    Acute pain of left knee    Anxiety    Relevant Medications    ALPRAZolam (XANAX) 0 25 mg tablet      Other Visit Diagnoses     Screen for colon cancer    -  Primary    Relevant Orders    Ambulatory referral for colonoscopy    Class 2 severe obesity due to excess calories with serious comorbidity and body mass index (BMI) of 35 0 to 35 9 in adult [facility])        Relevant Medications    Naltrexone-buPROPion HCl ER (Contrave) 8-90 MG TB12    Skin lesion        Relevant Orders    Ambulatory Referral to Dermatology          Depression Screening Follow-up Plan: Patient's depression screening was positive with a PHQ-2 score of 2  Their PHQ-9 score was   Patient assessed for underlying major depression  They have no active suicidal ideations  Brief counseling provided and recommend additional follow-up/re-evaluation next office visit  Continue regular follow-up with their psychologist/therapist/psychiatrist who is managing their mental health condition(s)  Diagnoses and all orders for this visit:    Screen for colon cancer  -     Ambulatory referral for colonoscopy; Future    Acute pain of left knee    Class 2 severe obesity due to excess calories with serious comorbidity and body mass index (BMI) of 35 0 to 35 9 in adult ([facility])  -     Naltrexone-buPROPion HCl ER (Contrave) 8-90 MG TB12; Take 1 tablet by mouth in the morning    Primary hypertension  -     amLODIPine (NORVASC) 2 5 mg tablet; Take 1 tablet (2 5 mg total) by mouth daily    Anxiety  -     ALPRAZolam (XANAX) 0 25 mg tablet; Take 1 tablet (0 25 mg total) by mouth daily at bedtime as needed for anxiety    Skin lesion  -     Ambulatory Referral to Dermatology;  Future              Subjective:      Patient ID: Maria Ines Swanson is a 49 y o  female  CC:    Chief Complaint   Patient presents with    Nevus     check mole on right side  mgb    Weight Loss     discuss losing weight   mgb       HPI:    Issues with sleeping/anxiety, gained weight and knee hurting more, new insurance covers Contrave,has mole that has moth gotten larger and darker-wants to see derm      The following portions of the patient's history were reviewed and updated as appropriate: allergies, current medications, past family history, past medical history, past social history, past surgical history and problem list       Review of Systems   Constitutional: Negative for activity change, appetite change and fatigue  Respiratory: Negative for shortness of breath  Cardiovascular: Negative for chest pain  Skin:        Skin lesion   Neurological: Negative for dizziness and headaches  Psychiatric/Behavioral: The patient is nervous/anxious  Data to review:       Objective:    Vitals:    02/09/22 1621   BP: 168/82   BP Location: Right arm   Patient Position: Sitting   Cuff Size: Large   Temp: 97 8 °F (36 6 °C)   TempSrc: Temporal   Weight: 86 2 kg (190 lb)   Height: 5' 1" (1 549 m)        Physical Exam  Vitals reviewed  Constitutional:       Appearance: Normal appearance  She is obese  Cardiovascular:      Rate and Rhythm: Normal rate and regular rhythm  Pulses: Normal pulses  Heart sounds: Normal heart sounds  Pulmonary:      Effort: Pulmonary effort is normal       Breath sounds: Normal breath sounds  Musculoskeletal:         General: Tenderness present  Comments: r knee   Lymphadenopathy:      Cervical: No cervical adenopathy  Skin:     Comments: 1 cm dark mole r side of chest wall  raised   Neurological:      Mental Status: She is alert

## 2022-02-11 ENCOUNTER — TELEPHONE (OUTPATIENT)
Dept: FAMILY MEDICINE CLINIC | Facility: CLINIC | Age: 52
End: 2022-02-11

## 2022-02-11 NOTE — TELEPHONE ENCOUNTER
Jarek Beltrán: [id number] Need help?   Call us at [phone]    Status New(Not sent to plan)  Member Inquiry service failure or member validation failure    Drug Contrave 8-90MG er tablets   Form [employer] Electronic [place] Form (2139 NCPDP)

## 2022-02-11 NOTE — TELEPHONE ENCOUNTER
Unable to submit prior authorization for patient's Contrave ER 8-90 MG tablet due to Eligibility ResultsNo eligibility was found  Please reconfirm the patient's plan before submitting

## 2022-02-11 NOTE — TELEPHONE ENCOUNTER
Ruth Schulteolic: [id number] - [place] Case ID: [id number]  Need help?  Call us at [phone]    Status Sent to Plan today  Cannot find matching patient    Drug Contrave 8-90MG er tablets   Form [employer] Electronic [place] Form (8460 NCPDP)

## 2022-02-14 ENCOUNTER — HOSPITAL ENCOUNTER (OUTPATIENT)
Facility: HOSPITAL | Age: 52
Setting detail: OBSERVATION
Discharge: HOME | End: 2022-02-15
Attending: EMERGENCY MEDICINE | Admitting: INTERNAL MEDICINE
Payer: COMMERCIAL

## 2022-02-14 ENCOUNTER — TELEPHONE (OUTPATIENT)
Dept: FAMILY MEDICINE CLINIC | Facility: CLINIC | Age: 52
End: 2022-02-14

## 2022-02-14 ENCOUNTER — APPOINTMENT (EMERGENCY)
Dept: RADIOLOGY | Facility: HOSPITAL | Age: 52
End: 2022-02-14
Payer: COMMERCIAL

## 2022-02-14 DIAGNOSIS — T78.2XXA ANAPHYLAXIS: Primary | ICD-10-CM

## 2022-02-14 DIAGNOSIS — R21 RASH: ICD-10-CM

## 2022-02-14 DIAGNOSIS — R00.0 TACHYCARDIA: ICD-10-CM

## 2022-02-14 DIAGNOSIS — R94.31 ABNORMAL EKG: ICD-10-CM

## 2022-02-14 DIAGNOSIS — R06.02 SOB (SHORTNESS OF BREATH): ICD-10-CM

## 2022-02-14 PROBLEM — T78.40XA ALLERGIC REACTION: Status: ACTIVE | Noted: 2022-02-14

## 2022-02-14 LAB
2HR DELTA HS TROPONIN: 6 NG/L
4HR DELTA HS TROPONIN: 7 NG/L
ANION GAP SERPL CALCULATED.3IONS-SCNC: 15 MMOL/L (ref 4–13)
ATRIAL RATE: 103 BPM
ATRIAL RATE: 117 BPM
ATRIAL RATE: 92 BPM
BASOPHILS # BLD AUTO: 0.09 THOUSANDS/ΜL (ref 0–0.1)
BASOPHILS NFR BLD AUTO: 0 % (ref 0–1)
BUN SERPL-MCNC: 10 MG/DL (ref 5–25)
CALCIUM SERPL-MCNC: 7.8 MG/DL (ref 8.3–10.1)
CARDIAC TROPONIN I PNL SERPL HS: 10 NG/L
CARDIAC TROPONIN I PNL SERPL HS: 3 NG/L
CARDIAC TROPONIN I PNL SERPL HS: 9 NG/L
CHLORIDE SERPL-SCNC: 103 MMOL/L (ref 100–108)
CO2 SERPL-SCNC: 20 MMOL/L (ref 21–32)
CREAT SERPL-MCNC: 0.71 MG/DL (ref 0.6–1.3)
EOSINOPHIL # BLD AUTO: 0.45 THOUSAND/ΜL (ref 0–0.61)
EOSINOPHIL NFR BLD AUTO: 2 % (ref 0–6)
ERYTHROCYTE [DISTWIDTH] IN BLOOD BY AUTOMATED COUNT: 12 % (ref 11.6–15.1)
GFR SERPL CREATININE-BSD FRML MDRD: 99 ML/MIN/1.73SQ M
GLUCOSE SERPL-MCNC: 219 MG/DL (ref 65–140)
HCT VFR BLD AUTO: 40.9 % (ref 34.8–46.1)
HGB BLD-MCNC: 13.3 G/DL (ref 11.5–15.4)
IMM GRANULOCYTES # BLD AUTO: 0.2 THOUSAND/UL (ref 0–0.2)
IMM GRANULOCYTES NFR BLD AUTO: 1 % (ref 0–2)
LYMPHOCYTES # BLD AUTO: 4.55 THOUSANDS/ΜL (ref 0.6–4.47)
LYMPHOCYTES NFR BLD AUTO: 21 % (ref 14–44)
MCH RBC QN AUTO: 31.5 PG (ref 26.8–34.3)
MCHC RBC AUTO-ENTMCNC: 32.5 G/DL (ref 31.4–37.4)
MCV RBC AUTO: 97 FL (ref 82–98)
MONOCYTES # BLD AUTO: 0.58 THOUSAND/ΜL (ref 0.17–1.22)
MONOCYTES NFR BLD AUTO: 3 % (ref 4–12)
NEUTROPHILS # BLD AUTO: 15.87 THOUSANDS/ΜL (ref 1.85–7.62)
NEUTS SEG NFR BLD AUTO: 73 % (ref 43–75)
NRBC BLD AUTO-RTO: 0 /100 WBCS
P AXIS: 60 DEGREES
P AXIS: 68 DEGREES
P AXIS: 69 DEGREES
PLATELET # BLD AUTO: 297 THOUSANDS/UL (ref 149–390)
PLATELET # BLD AUTO: 335 THOUSANDS/UL (ref 149–390)
PMV BLD AUTO: 9.5 FL (ref 8.9–12.7)
PMV BLD AUTO: 9.6 FL (ref 8.9–12.7)
POTASSIUM SERPL-SCNC: 3.2 MMOL/L (ref 3.5–5.3)
PR INTERVAL: 154 MS
PR INTERVAL: 156 MS
PR INTERVAL: 200 MS
QRS AXIS: 38 DEGREES
QRS AXIS: 44 DEGREES
QRS AXIS: 8 DEGREES
QRSD INTERVAL: 102 MS
QRSD INTERVAL: 96 MS
QRSD INTERVAL: 98 MS
QT INTERVAL: 294 MS
QT INTERVAL: 308 MS
QT INTERVAL: 382 MS
QTC INTERVAL: 403 MS
QTC INTERVAL: 410 MS
QTC INTERVAL: 472 MS
RBC # BLD AUTO: 4.22 MILLION/UL (ref 3.81–5.12)
SODIUM SERPL-SCNC: 138 MMOL/L (ref 136–145)
T WAVE AXIS: -30 DEGREES
T WAVE AXIS: -69 DEGREES
T WAVE AXIS: 6 DEGREES
VENTRICULAR RATE: 103 BPM
VENTRICULAR RATE: 117 BPM
VENTRICULAR RATE: 92 BPM
WBC # BLD AUTO: 21.74 THOUSAND/UL (ref 4.31–10.16)

## 2022-02-14 PROCEDURE — 71045 X-RAY EXAM CHEST 1 VIEW: CPT

## 2022-02-14 PROCEDURE — 93010 ELECTROCARDIOGRAM REPORT: CPT | Performed by: INTERNAL MEDICINE

## 2022-02-14 PROCEDURE — 99220 PR INITIAL OBSERVATION CARE/DAY 70 MINUTES: CPT | Performed by: INTERNAL MEDICINE

## 2022-02-14 PROCEDURE — 99285 EMERGENCY DEPT VISIT HI MDM: CPT | Performed by: EMERGENCY MEDICINE

## 2022-02-14 PROCEDURE — 86003 ALLG SPEC IGE CRUDE XTRC EA: CPT | Performed by: INTERNAL MEDICINE

## 2022-02-14 PROCEDURE — 96366 THER/PROPH/DIAG IV INF ADDON: CPT

## 2022-02-14 PROCEDURE — 80048 BASIC METABOLIC PNL TOTAL CA: CPT | Performed by: EMERGENCY MEDICINE

## 2022-02-14 PROCEDURE — 93005 ELECTROCARDIOGRAM TRACING: CPT

## 2022-02-14 PROCEDURE — 84484 ASSAY OF TROPONIN QUANT: CPT | Performed by: EMERGENCY MEDICINE

## 2022-02-14 PROCEDURE — 85025 COMPLETE CBC W/AUTO DIFF WBC: CPT | Performed by: EMERGENCY MEDICINE

## 2022-02-14 PROCEDURE — 36415 COLL VENOUS BLD VENIPUNCTURE: CPT | Performed by: EMERGENCY MEDICINE

## 2022-02-14 PROCEDURE — 94640 AIRWAY INHALATION TREATMENT: CPT

## 2022-02-14 PROCEDURE — 84484 ASSAY OF TROPONIN QUANT: CPT | Performed by: INTERNAL MEDICINE

## 2022-02-14 PROCEDURE — 96372 THER/PROPH/DIAG INJ SC/IM: CPT

## 2022-02-14 PROCEDURE — 96375 TX/PRO/DX INJ NEW DRUG ADDON: CPT

## 2022-02-14 PROCEDURE — 99285 EMERGENCY DEPT VISIT HI MDM: CPT

## 2022-02-14 PROCEDURE — 96365 THER/PROPH/DIAG IV INF INIT: CPT

## 2022-02-14 PROCEDURE — 85049 AUTOMATED PLATELET COUNT: CPT | Performed by: INTERNAL MEDICINE

## 2022-02-14 PROCEDURE — 82785 ASSAY OF IGE: CPT | Performed by: INTERNAL MEDICINE

## 2022-02-14 RX ORDER — ALBUTEROL SULFATE 2.5 MG/3ML
2.5 SOLUTION RESPIRATORY (INHALATION) ONCE
Status: COMPLETED | OUTPATIENT
Start: 2022-02-14 | End: 2022-02-14

## 2022-02-14 RX ORDER — ATORVASTATIN CALCIUM 40 MG/1
40 TABLET, FILM COATED ORAL EVERY EVENING
Status: DISCONTINUED | OUTPATIENT
Start: 2022-02-14 | End: 2022-02-15 | Stop reason: HOSPADM

## 2022-02-14 RX ORDER — DIPHENHYDRAMINE HYDROCHLORIDE 50 MG/ML
25 INJECTION INTRAMUSCULAR; INTRAVENOUS ONCE
Status: COMPLETED | OUTPATIENT
Start: 2022-02-14 | End: 2022-02-14

## 2022-02-14 RX ORDER — ALPRAZOLAM 0.25 MG/1
0.25 TABLET ORAL
Status: DISCONTINUED | OUTPATIENT
Start: 2022-02-14 | End: 2022-02-15 | Stop reason: HOSPADM

## 2022-02-14 RX ORDER — ASPIRIN 81 MG/1
81 TABLET, CHEWABLE ORAL DAILY
Status: DISCONTINUED | OUTPATIENT
Start: 2022-02-15 | End: 2022-02-15 | Stop reason: HOSPADM

## 2022-02-14 RX ORDER — HYDRALAZINE HYDROCHLORIDE 20 MG/ML
10 INJECTION INTRAMUSCULAR; INTRAVENOUS EVERY 6 HOURS PRN
Status: DISCONTINUED | OUTPATIENT
Start: 2022-02-14 | End: 2022-02-15 | Stop reason: HOSPADM

## 2022-02-14 RX ORDER — AMLODIPINE BESYLATE 2.5 MG/1
2.5 TABLET ORAL DAILY
Status: DISCONTINUED | OUTPATIENT
Start: 2022-02-14 | End: 2022-02-15 | Stop reason: HOSPADM

## 2022-02-14 RX ORDER — ACETAMINOPHEN 325 MG/1
650 TABLET ORAL EVERY 6 HOURS PRN
Status: DISCONTINUED | OUTPATIENT
Start: 2022-02-14 | End: 2022-02-15 | Stop reason: HOSPADM

## 2022-02-14 RX ORDER — SODIUM CHLORIDE, SODIUM GLUCONATE, SODIUM ACETATE, POTASSIUM CHLORIDE, MAGNESIUM CHLORIDE, SODIUM PHOSPHATE, DIBASIC, AND POTASSIUM PHOSPHATE .53; .5; .37; .037; .03; .012; .00082 G/100ML; G/100ML; G/100ML; G/100ML; G/100ML; G/100ML; G/100ML
500 INJECTION, SOLUTION INTRAVENOUS ONCE
Status: COMPLETED | OUTPATIENT
Start: 2022-02-14 | End: 2022-02-14

## 2022-02-14 RX ORDER — POTASSIUM CHLORIDE 20 MEQ/1
40 TABLET, EXTENDED RELEASE ORAL ONCE
Status: COMPLETED | OUTPATIENT
Start: 2022-02-14 | End: 2022-02-14

## 2022-02-14 RX ORDER — PREDNISONE 20 MG/1
40 TABLET ORAL DAILY
Status: DISCONTINUED | OUTPATIENT
Start: 2022-02-15 | End: 2022-02-15 | Stop reason: HOSPADM

## 2022-02-14 RX ORDER — NAPROXEN 250 MG/1
500 TABLET ORAL 2 TIMES DAILY PRN
Status: DISCONTINUED | OUTPATIENT
Start: 2022-02-14 | End: 2022-02-15

## 2022-02-14 RX ORDER — DEXAMETHASONE SODIUM PHOSPHATE 4 MG/ML
8 INJECTION, SOLUTION INTRA-ARTICULAR; INTRALESIONAL; INTRAMUSCULAR; INTRAVENOUS; SOFT TISSUE ONCE
Status: COMPLETED | OUTPATIENT
Start: 2022-02-14 | End: 2022-02-14

## 2022-02-14 RX ORDER — ALBUTEROL SULFATE 90 UG/1
2 AEROSOL, METERED RESPIRATORY (INHALATION) ONCE
Status: DISCONTINUED | OUTPATIENT
Start: 2022-02-14 | End: 2022-02-14

## 2022-02-14 RX ORDER — EPINEPHRINE 1 MG/ML
0.3 INJECTION, SOLUTION, CONCENTRATE INTRAVENOUS ONCE
Status: COMPLETED | OUTPATIENT
Start: 2022-02-14 | End: 2022-02-14

## 2022-02-14 RX ORDER — ONDANSETRON 2 MG/ML
4 INJECTION INTRAMUSCULAR; INTRAVENOUS EVERY 6 HOURS PRN
Status: DISCONTINUED | OUTPATIENT
Start: 2022-02-14 | End: 2022-02-15 | Stop reason: HOSPADM

## 2022-02-14 RX ADMIN — FAMOTIDINE 20 MG: 10 INJECTION INTRAVENOUS at 10:05

## 2022-02-14 RX ADMIN — HYDRALAZINE HYDROCHLORIDE 10 MG: 20 INJECTION, SOLUTION INTRAMUSCULAR; INTRAVENOUS at 22:59

## 2022-02-14 RX ADMIN — ENOXAPARIN SODIUM 40 MG: 40 INJECTION SUBCUTANEOUS at 15:24

## 2022-02-14 RX ADMIN — SODIUM CHLORIDE, SODIUM GLUCONATE, SODIUM ACETATE, POTASSIUM CHLORIDE, MAGNESIUM CHLORIDE, SODIUM PHOSPHATE, DIBASIC, AND POTASSIUM PHOSPHATE 500 ML: .53; .5; .37; .037; .03; .012; .00082 INJECTION, SOLUTION INTRAVENOUS at 10:14

## 2022-02-14 RX ADMIN — POTASSIUM CHLORIDE 40 MEQ: 1500 TABLET, EXTENDED RELEASE ORAL at 13:14

## 2022-02-14 RX ADMIN — ALBUTEROL SULFATE 2.5 MG: 2.5 SOLUTION RESPIRATORY (INHALATION) at 10:08

## 2022-02-14 RX ADMIN — DEXAMETHASONE SODIUM PHOSPHATE 8 MG: 4 INJECTION INTRA-ARTICULAR; INTRALESIONAL; INTRAMUSCULAR; INTRAVENOUS; SOFT TISSUE at 10:06

## 2022-02-14 RX ADMIN — EPINEPHRINE 0.3 MG: 1 INJECTION, SOLUTION, CONCENTRATE INTRAVENOUS at 10:10

## 2022-02-14 RX ADMIN — ALPRAZOLAM 0.25 MG: 0.25 TABLET ORAL at 23:05

## 2022-02-14 RX ADMIN — ATORVASTATIN CALCIUM 40 MG: 40 TABLET, FILM COATED ORAL at 17:14

## 2022-02-14 RX ADMIN — DIPHENHYDRAMINE HYDROCHLORIDE 25 MG: 50 INJECTION, SOLUTION INTRAMUSCULAR; INTRAVENOUS at 10:04

## 2022-02-14 RX ADMIN — HYDRALAZINE HYDROCHLORIDE 10 MG: 20 INJECTION, SOLUTION INTRAMUSCULAR; INTRAVENOUS at 16:11

## 2022-02-14 NOTE — TELEPHONE ENCOUNTER
Pt called complaining of a possible allergic reaction  She states she woke up with hives, chest pain, and shortness of breath  She said she no longer has the chest pain or shortness of breath, just huge hives  The first available appointment for today was at 12:30 PM; so pt is going to go to urgent care to be evaluated

## 2022-02-14 NOTE — PLAN OF CARE
Problem: PAIN - ADULT  Goal: Verbalizes/displays adequate comfort level or baseline comfort level  Description: Interventions:  - Encourage patient to monitor pain and request assistance  - Assess pain using appropriate pain scale  - Administer analgesics based on type and severity of pain and evaluate response  - Implement non-pharmacological measures as appropriate and evaluate response  - Consider cultural and social influences on pain and pain management  - Notify physician/advanced practitioner if interventions unsuccessful or patient reports new pain  Outcome: Progressing     Problem: INFECTION - ADULT  Goal: Absence or prevention of progression during hospitalization  Description: INTERVENTIONS:  - Assess and monitor for signs and symptoms of infection  - Monitor lab/diagnostic results  - Monitor all insertion sites, i e  indwelling lines, tubes, and drains  - Monitor endotracheal if appropriate and nasal secretions for changes in amount and color  - [place] appropriate cooling/warming therapies per order  - Administer medications as ordered  - Instruct and encourage patient and family to use good hand hygiene technique  - Identify and instruct in appropriate isolation precautions for identified infection/condition  Outcome: Progressing  Goal: Absence of fever/infection during neutropenic period  Description: INTERVENTIONS:  - Monitor WBC    Outcome: Progressing     Problem: DISCHARGE PLANNING  Goal: Discharge to home or other facility with appropriate resources  Description: INTERVENTIONS:  - Identify barriers to discharge w/patient and caregiver  - Arrange for needed discharge resources and transportation as appropriate  - Identify discharge learning needs (meds, wound care, etc )  - Arrange for interpretive services to assist at discharge as needed  - Refer to Case Management Department for coordinating discharge planning if the patient needs post-hospital services based on physician/advanced practitioner order or complex needs related to functional status, cognitive ability, or social support system  Outcome: Progressing     Problem: Knowledge Deficit  Goal: Patient/family/caregiver demonstrates understanding of disease process, treatment plan, medications, and discharge instructions  Description: Complete learning assessment and assess knowledge base    Interventions:  - Provide teaching at level of understanding  - Provide teaching via preferred learning methods  Outcome: Progressing     Problem: RESPIRATORY - ADULT  Goal: Achieves optimal ventilation and oxygenation  Description: INTERVENTIONS:  - Assess for changes in respiratory status  - Assess for changes in mentation and behavior  - Position to facilitate oxygenation and minimize respiratory effort  - Oxygen administered by appropriate delivery if ordered  - Initiate smoking cessation education as indicated  - Encourage broncho-pulmonary hygiene including cough, deep breathe, Incentive Spirometry  - Assess the need for suctioning and aspirate as needed  - Assess and instruct to report SOB or any respiratory difficulty  - Respiratory Therapy support as indicated  Outcome: Progressing

## 2022-02-14 NOTE — H&P
[address]  H&P- Linnea Espino 1970, 49 y o  female MRN: [id number]  Unit/Bed#: ED 15 Encounter: [id number]  Primary Care Provider: Neeta Lynne MD   Date and time admitted to hospital: 2/14/2022  9:41 AM    Assessment and Plan  * Tachycardia  Assessment & Plan  Patient presenting with tachycardia in the setting of recent allergic reaction and epinephrine and Decadron use  Low clinical suspicion for ACS  ERIKA score of 0  Will trend troponins given patient did have EKG changes which were nonspecific  Monitored on telemetry    Allergic reaction  Assessment & Plan  Unknown trigger - will start on prednisone 40 milligrams for 5 days  Pepcid once a day daily  Close monitor    Reactive airway disease  Assessment & Plan  No evidence of exacerbation  Close monitor, patient will need to see allergist as an outpatient        Code Status:  Full code    VTE Prophylaxis: Enoxaparin (Lovenox)  / sequential compression device     POLST: There is no POLST form on file for this patient (pre-hospital)  Discussion with family: Sister    Anticipated Length of Stay:  Patient will be admitted on an Observation basis with an anticipated length of stay of  less than 2 midnights  Justification for Hospital Stay: Tachycardia    Total Time for Visit, including Counseling / Coordination of Care: 30 minutes  Greater than 50% of this total time spent on direct patient counseling and coordination of care  Chief Complaint:     Chief Complaint   Patient presents with    Allergic Reaction     Pt reports waking up with hives, difficulty breathing and dizziness  Pt reports feeling better after using albuterol       History of Present Illness: Linnea Espino is a 49 y o  female who presents with shortness of breath, as worse airway swelling  The patient has a past medical history of asthma  In the emergency room she was felt to have allergic reaction, she has no known triggers    She initially had tachycardia which prompted ER evaluation  The patient denies any recent travel or trip, no recent PET her cats, no recent medication changes  She denies any other nausea or vomiting, at the time examination she is breathing well, protecting her airway without any significant issues  Review of Systems:    A complete and comprehensive 14 point organ system review was performed and all other systems are negative other than stated above in the HPI    Past Medical and Surgical History:     Past Medical History:   Diagnosis Date    Anemia     resolved 12/08/16    Anxiety     Asthma     Depression     Hypertension     Mental disorder     Trichomonal vulvovaginitis     last assessed 02/14/13       Past Surgical History:   Procedure Laterality Date    APPENDECTOMY      [place] LAPAROSCOPY W TOT HYSTERECTUTERUS <=250 GRAM  W TUBE/OVARY N/A 5/31/2018    Procedure: ROBOTIC TOTAL LAPAROSCOPIC HYSTERECTOMY,    BILATERAL SALPINGECTOMY AND INTRA-OP CYSTO;  Surgeon: Mertha Babinski, MD;  Location: [facility];  Service: Gynecology Oncology    TUBAL LIGATION         Meds/Allergies:    Prior to Admission medications    Medication Sig Start Date End Date Taking? Authorizing Provider   ALPRAZolam Earleen Bump) 0 25 mg tablet Take 1 tablet (0 25 mg total) by mouth daily at bedtime as needed for anxiety 2/9/22   Neri Selby MD   amLODIPine (NORVASC) 2 5 mg tablet Take 1 tablet (2 5 mg total) by mouth daily 2/9/22 5/10/22  Neri Selby MD   Naltrexone-buPROPion HCl ER (Contrave) 8-90 MG TB12 Take 1 tablet by mouth in the morning 2/9/22   Neri Selby MD   naproxen (NAPROSYN) 500 mg tablet Take 1 tablet (500 mg total) by mouth 2 (two) times a day as needed for mild pain or moderate pain 2/9/22 3/11/22  Neri Selby MD     I have reviewed home medications with patient personally  Allergies:    Allergies   Allergen Reactions    Barley Grass - Food Allergy Hives       Social History:     Marital Status: Single   Occupation:  Is a  for a company    Substance Use History:   Social History     Substance and Sexual Activity   Alcohol Use Yes    Alcohol/week: 4 0 standard drinks    Types: 4 Glasses of wine per week     Social History     Tobacco Use   Smoking Status Never Smoker   Smokeless Tobacco Never Used     Social History     Substance and Sexual Activity   Drug Use No       Family History:    Family History   Problem Relation Age of Onset    Hypertension Mother     Cancer Mother     No Known Problems Father     No Known Problems Sister     No Known Problems Maternal Grandmother     No Known Problems Maternal Grandfather     No Known Problems Paternal Grandmother     No Known Problems Paternal Grandfather        Physical Exam:     Vitals:   Blood Pressure: (!) 173/96 (02/14/22 1100)  Pulse: (!) 118 (02/14/22 1100)  Temperature: 97 6 °F (36 4 °C) (02/14/22 0940)  Temp Source: Oral (02/14/22 0940)  Respirations: 20 (02/14/22 1100)  Weight - Scale: 86 kg (189 lb 9 5 oz) (02/14/22 0940)  SpO2: 96 % (02/14/22 1100)      General: well appearing, no acute distress  HEENT: atraumatic, PERRLA, moist mucosa, normal pharynx, normal tonsils and adenoids, normal tongue, no fluid in sinuses  Neck: Trachea midline, no carotid bruit, no masses  Respiratory: normal chest wall expansion, CTA B, no r/r/w, no rubs  Cardiovascular:  Tachycardia  Abdomen: Soft, non-tender, non-distended, normal bowel sounds in all quadrants, no hepatosplenomegaly, no tympany  Rectal: deferred  Musculoskeletal: normal ROM in upper and lower extremities  Integumentary: warm, dry, and pink, with no rash, purpura, or petechia  Heme/Lymph: no lymphadenopathy, no bruises  Neurological: Cranial Nerves II-XII grossly intact  Psychiatric: cooperative with normal mood, affect, and cognition    Additional Data:     Lab Results: I have personally reviewed pertinent reports        Results from last 7 days   Lab Units 02/14/22  1125   WBC Thousand/uL 21 74*   HEMOGLOBIN g/dL 13 3   HEMATOCRIT % 40 9   PLATELETS Thousands/uL 335   NEUTROS PCT % 73   LYMPHS PCT % 21   MONOS PCT % 3*   EOS PCT % 2     Results from last 7 days   Lab Units 02/14/22  1125   SODIUM mmol/L 138   POTASSIUM mmol/L 3 2*   CHLORIDE mmol/L 103   CO2 mmol/L 20*   BUN mg/dL 10   CREATININE mg/dL 0 71   ANION GAP mmol/L 15*   CALCIUM mg/dL 7 8*   GLUCOSE RANDOM mg/dL 219*                     Results from last 7 days   Lab Units 02/14/22  1125   HS TNI 0HR ng/L 3       Imaging: I have personally reviewed pertinent reports  XR chest 1 view portable    (Results Pending)       EKG, Pathology, and Other Studies Reviewed on Admission:   · EKG:  Telemetry monitor reviewed, demonstrates sinus tachycardia with no  acute ischemic changes    Allscripts / Epic Records Reviewed: Yes     ** Please Note: This note was completed in part utilizing [employer] Direct Software  Grammatical errors, random word insertions, spelling mistakes, and incomplete sentences may be an occasional consequence of this system secondary to software limitations, ambient noise, and hardware issues  If you have any questions or concerns about the content, text, or information contained within the body of this dictation, please contact the provider for clarification  **

## 2022-02-14 NOTE — ED PROVIDER NOTES
History  Chief Complaint   Patient presents with    Allergic Reaction     Pt reports waking up with hives, difficulty breathing and dizziness  Pt reports feeling better after using albuterol     Patient is a 60-year-old female with past medical history significant for hypertension presenting to the emergency department with possible allergic reaction  Patient states that she woke up around 7 this morning and felt short of breath  She took her albuterol inhaler which helped with her symptoms  About an hour and half later she woke up feeling short of breath, felt like her throat was closing as well as had a generalized itchy rash  Patient states that she took her inhaler and came to the hospital for evaluation  Patient denies any new detergents, new clothing, new soaps, any other inciting contact that could have triggered her allergic reaction  Patient endorses  grass allergy as well as dust allergy  Patient states she took her blood pressure medication right before coming to the hospital           Prior to Admission Medications   Prescriptions Last Dose Informant Patient Reported? Taking?    ALPRAZolam (XANAX) 0 25 mg tablet   No No   Sig: Take 1 tablet (0 25 mg total) by mouth daily at bedtime as needed for anxiety   Naltrexone-buPROPion HCl ER (Contrave) 8-90 MG TB12   No No   Sig: Take 1 tablet by mouth in the morning   amLODIPine (NORVASC) 2 5 mg tablet   No No   Sig: Take 1 tablet (2 5 mg total) by mouth daily   naproxen (NAPROSYN) 500 mg tablet   No No   Sig: Take 1 tablet (500 mg total) by mouth 2 (two) times a day as needed for mild pain or moderate pain      Facility-Administered Medications: None       Past Medical History:   Diagnosis Date    Anemia     resolved 12/08/16    Anxiety     Asthma     Depression     Hypertension     Mental disorder     Trichomonal vulvovaginitis     last assessed 02/14/13       Past Surgical History:   Procedure Laterality Date    APPENDECTOMY      [place] LAPAROSCOPY W TOT HYSTERECTUTERUS <=250 GRAM  W TUBE/OVARY N/A 5/31/2018    Procedure: ROBOTIC TOTAL LAPAROSCOPIC HYSTERECTOMY,    BILATERAL SALPINGECTOMY AND INTRA-OP CYSTO;  Surgeon: Moses Doshi MD;  [facility];  Service: Gynecology Oncology    TUBAL LIGATION         Family History   Problem Relation Age of Onset    Hypertension Mother     Cancer Mother     No Known Problems Father     No Known Problems Sister     No Known Problems Maternal Grandmother     No Known Problems Maternal Grandfather     No Known Problems Paternal Grandmother     No Known Problems Paternal Grandfather      I have reviewed and agree with the history as documented  E-Cigarette/Vaping     E-Cigarette/Vaping Substances     Social History     Tobacco Use    Smoking status: Never Smoker    Smokeless tobacco: Never Used   Substance Use Topics    Alcohol use: Yes     Alcohol/week: 4 0 standard drinks     Types: 4 Glasses of wine per week    Drug use: No        Review of Systems   Constitutional: Positive for activity change  Negative for chills, fatigue and fever  HENT: Negative for ear pain and sore throat  Eyes: Negative for pain and visual disturbance  Respiratory: Positive for shortness of breath and wheezing  Negative for cough and chest tightness  Cardiovascular: Negative for chest pain and palpitations  Gastrointestinal: Negative for abdominal pain and vomiting  Genitourinary: Negative for dysuria and hematuria  Musculoskeletal: Negative for arthralgias and back pain  Skin: Negative for color change and rash  Neurological: Negative for seizures and syncope  All other systems reviewed and are negative        Physical Exam  ED Triage Vitals [02/14/22 0940]   Temperature Pulse Respirations Blood Pressure SpO2   97 6 °F (36 4 °C) 93 20 (!) 204/113 97 %      Temp Source Heart Rate Source Patient Position - Orthostatic VS BP Location FiO2 (%)   Oral Monitor Sitting Right arm --      Pain Score       No Pain             Orthostatic Vital Signs  Vitals:    02/14/22 0940 02/14/22 1100   BP: (!) 204/113 (!) 173/96   Pulse: 93 (!) 118   Patient Position - Orthostatic VS: Sitting Lying       Physical Exam  Vitals and nursing note reviewed  Constitutional:       General: She is in acute distress  Appearance: She is well-developed  HENT:      Head: Normocephalic and atraumatic  Right Ear: External ear normal       Left Ear: External ear normal       Nose: Nose normal       Mouth/Throat:      Mouth: Mucous membranes are moist    Eyes:      Extraocular Movements: Extraocular movements intact  Conjunctiva/sclera: Conjunctivae normal       Pupils: Pupils are equal, round, and reactive to light  Cardiovascular:      Rate and Rhythm: Normal rate and regular rhythm  Heart sounds: Normal heart sounds  No murmur heard  Pulmonary:      Effort: No respiratory distress  Breath sounds: Wheezing present  Abdominal:      General: Abdomen is flat  Palpations: Abdomen is soft  Tenderness: There is no abdominal tenderness  There is no guarding or rebound  Musculoskeletal:         General: Normal range of motion  Cervical back: Normal range of motion and neck supple  Right lower leg: No edema  Left lower leg: No edema  Skin:     General: Skin is warm and dry  Capillary Refill: Capillary refill takes 2 to 3 seconds  Findings: Rash present  Neurological:      General: No focal deficit present  Mental Status: She is alert and oriented to person, place, and time     Psychiatric:         Mood and Affect: Mood normal          Behavior: Behavior normal          ED Medications  Medications   potassium chloride (K-DUR,KLOR-CON) CR tablet 40 mEq (has no administration in time range)   EPINEPHrine PF (ADRENALIN) 1 mg/mL injection 0 3 mg (0 3 mg Intramuscular Given 2/14/22 1010)   famotidine (PEPCID) injection 20 mg (20 mg Intravenous Given 2/14/22 1005) multi-electrolyte (ISOLYTE-S PH 7 4) bolus 500 mL (0 mL Intravenous Stopped 2/14/22 1214)   diphenhydrAMINE (BENADRYL) injection 25 mg (25 mg Intravenous Given 2/14/22 1004)   dexamethasone (DECADRON) injection 8 mg (8 mg Intravenous Given 2/14/22 1006)   albuterol inhalation solution 2 5 mg (2 5 mg Nebulization Given 2/14/22 1008)       Diagnostic Studies  Results Reviewed     Procedure Component Value Units Date/Time    HS Troponin I 4hr [[id number]]     Lab Status: No result Specimen: Blood     HS Troponin 0hr (reflex protocol) [[id number]]  (Normal) Collected: 02/14/22 1125    Lab Status: Final result Specimen: Blood from Arm, Left Updated: 02/14/22 1201     hs TnI 0hr 3 ng/L     HS Troponin I 2hr [[id number]]     Lab Status: No result Specimen: Blood     Basic metabolic panel [[id number]]  (Abnormal) Collected: 02/14/22 1125    Lab Status: Final result Specimen: Blood from Arm, Left Updated: 02/14/22 1200     Sodium 138 mmol/L      Potassium 3 2 mmol/L      Chloride 103 mmol/L      CO2 20 mmol/L      ANION GAP 15 mmol/L      BUN 10 mg/dL      Creatinine 0 71 mg/dL      Glucose 219 mg/dL      Calcium 7 8 mg/dL      eGFR 99 ml/min/1 73sq m     Narrative:      [place] guidelines for Chronic Kidney Disease (CKD):     Stage 1 with normal or high GFR (GFR > 90 mL/min/1 73 square meters)    Stage 2 Mild CKD (GFR = 60-89 mL/min/1 73 square meters)    Stage 3A Moderate CKD (GFR = 45-59 mL/min/1 73 square meters)    Stage 3B Moderate CKD (GFR = 30-44 mL/min/1 73 square meters)    Stage 4 Severe CKD (GFR = 15-29 mL/min/1 73 square meters)    Stage 5 End Stage CKD (GFR <15 mL/min/1 73 square meters)  Note: GFR calculation is accurate only with a steady state creatinine    CBC and differential [[id number]]  (Abnormal) Collected: 02/14/22 1125    Lab Status: Final result Specimen: Blood from Arm, Left Updated: 02/14/22 1133     WBC 21 74 Thousand/uL      RBC 4 22 Million/uL      Hemoglobin 13 3 g/dL      Hematocrit 40 9 %      MCV 97 fL      MCH 31 5 pg      MCHC 32 5 g/dL      RDW 12 0 %      MPV 9 6 fL      Platelets 912 Thousands/uL      nRBC 0 /100 WBCs      Neutrophils Relative 73 %      Immat GRANS % 1 %      Lymphocytes Relative 21 %      Monocytes Relative 3 %      Eosinophils Relative 2 %      Basophils Relative 0 %      Neutrophils Absolute 15 87 Thousands/µL      Immature Grans Absolute 0 20 Thousand/uL      Lymphocytes Absolute 4 55 Thousands/µL      Monocytes Absolute 0 58 Thousand/µL      Eosinophils Absolute 0 45 Thousand/µL      Basophils Absolute 0 09 Thousands/µL                  XR chest 1 view portable    (Results Pending)         Procedures  ECG 12 Lead Documentation Only    Date/Time: 2/14/2022 12:01 PM  Performed by: Aelna Cartwright DO  Authorized by: Alena Cartwright DO     Indications / Diagnosis:  Increased hr  ECG reviewed by me, the ED Provider: yes    Patient location:  ED  Previous ECG:     Previous ECG:  Compared to current    Similarity:  Changes noted  Interpretation:     Interpretation: abnormal    Rate:     ECG rate:  117    ECG rate assessment: tachycardic    Rhythm:     Rhythm: sinus rhythm    Ectopy:     Ectopy: none    QRS:     QRS axis:  Normal    QRS intervals:  Normal  Conduction:     Conduction: normal    ST segments:     ST segments:  Abnormal    Depression:  V5, V6, V4 and V3  T waves:     T waves: non-specific            ED Course  ED Course as of 02/14/22 1251   Mon Feb 14, 2022   0944 Blood Pressure(!): 204/113   0944 Temperature: 97 6 °F (36 4 °C)   0944 Temp Source: Oral   0944 Pulse: 93   0944 Respirations: 20   0944 SpO2: 97 %   0944 Patient has more than 2 organ systems involved with possible allergic reaction  Will treat for anaphylaxis  Patient is wheezing on exam and her throat looks erythematous and swollen  Will give patient epinephrine, albuterol inhaler, Decadron, famotidine, Benadryl will also give her a bolus of fluid  Patient has no questions or concerns at this time will frequently re-evaluate  1047 Patient re-evaluted resting comfortably states her throat and iching has improved  States shes breathing much better  Patient states her heart is racing, informed her that this oculd be due to the medications  Will check cardiac labs for heart racing  Will re-evaluation  1101 Blood Pressure(!): 173/96   1101 Pulse(!): 118   1101 Respirations: 20   1101 SpO2: 96 %   1136 WBC(!): 21 74   1200 Potassium(!): 3 2  Will treat with 40 potassium  1203 hs TnI 0hr: 3   1203 Patient feeling a little better is just complaining of feeling tired  Patient states she is still feeling as though her heart is racing  [facility]   [facility] [address] [facility] to be monitor as her heart rate and symptoms have not resolved since given the epi  Patient is in agreement has no questions or concerns at this time                                       MDM    Disposition  Final diagnoses:   Anaphylaxis   SOB (shortness of breath)   Rash   Tachycardia   Abnormal EKG     Time reflects when diagnosis was documented in both MDM as applicable and the Disposition within this note     Time User Action Codes Description Comment    2/14/2022 12:22 PM Binta Reilly Add [T78  2XXA] Anaphylaxis     2/14/2022 12:22 PM Palladino, Francyne Kuster Add [R06 02] SOB (shortness of breath)     2/14/2022 12:22 PM Binta Reilly Add [R21] Rash     2/14/2022 12:22 PM Palladino, Francyne Kuster Add [R00 0] Tachycardia     2/14/2022 12:22 PM Binta Reilly Add [R94 31] Abnormal EKG       ED Disposition     ED Disposition Condition Date/Time Comment    Admit Stable Mon Feb 14, 2022 12:22 PM Case was discussed with Dr Alyssa Harden and the patient's admission status was agreed to be obs tele to the service of Dr Madyson Newby    None         Patient's Medications   Discharge Prescriptions    No medications on file     No discharge procedures on file  PDMP Review       Value Time User    PDMP Reviewed  Yes 2/9/2022  5:03 PM Ariadna Estrella MD           ED Provider  Attending physically available and evaluated Theresa Nunes I managed the patient along with the ED Attending      Electronically Signed by         Gila Best DO  02/14/22 0161

## 2022-02-14 NOTE — ASSESSMENT & PLAN NOTE
Patient presenting with tachycardia in the setting of recent allergic reaction and epinephrine and Decadron use  Low clinical suspicion for ACS  ERIKA score of 0  Will trend troponins given patient did have EKG changes which were nonspecific  Monitored on telemetry

## 2022-02-14 NOTE — ASSESSMENT & PLAN NOTE
Unknown trigger - will start on prednisone 40 milligrams for 5 days  Pepcid once a day daily  Close monitor  NE RAST Panel

## 2022-02-14 NOTE — ED ATTENDING ATTESTATION
2/14/2022  IDanyelle DO, saw and evaluated the patient  I have discussed the patient with the resident/non-physician practitioner and agree with the resident's/non-physician practitioner's findings, Plan of Care, and MDM as documented in the resident's/non-physician practitioner's note, except where noted  All available labs and Radiology studies were reviewed  I was present for key portions of any procedure(s) performed by the resident/non-physician practitioner and I was immediately available to provide assistance  At this point I agree with the current assessment done in the Emergency Department  I have conducted an independent evaluation of this patient a history and physical is as follows:    48 yo F h/o HTN, asthma presenting for evaluation of allergic rxn  Pt states that around 7am she had SOB, felt like her asthma, used albuterol and went back to sleep  She then woke up about 1 hour PTA and had recurrent SOB, pruritis/rash and felt throat closing  States she has had hives in the past, but usually mild and resolve with Benadryl  Never had anaphylaxis before   No new exposures that pt can recall  Allergies to dust  HTN- Amlodipine    MDM: 48 yo F with allergic rx/anaphylaxis from unknown exposure- Epi, Steroids, Benadryl, pepcid, reassess    Pt later c/o CP, possibly from the epi, will get cardiac workup  WBC 21, pt does report completing course of steroids last week    EKG with new t wave inversions inferiorly/laterally, admitted for ACS r/o    ED Course         Critical Care Time  Procedures

## 2022-02-15 VITALS
HEART RATE: 77 BPM | WEIGHT: 189.6 LBS | OXYGEN SATURATION: 96 % | TEMPERATURE: 97.2 F | SYSTOLIC BLOOD PRESSURE: 160 MMHG | BODY MASS INDEX: 35.82 KG/M2 | RESPIRATION RATE: 18 BRPM | DIASTOLIC BLOOD PRESSURE: 100 MMHG

## 2022-02-15 LAB
A ALTERNATA IGE QN: <0.1 KUA/I
A FUMIGATUS IGE QN: <0.1 KUA/I
ALBUMIN SERPL BCP-MCNC: 3.7 G/DL (ref 3.5–5)
ALLERGEN COMMENT: ABNORMAL
ALP SERPL-CCNC: 69 U/L (ref 46–116)
ALT SERPL W P-5'-P-CCNC: 54 U/L (ref 12–78)
ANION GAP SERPL CALCULATED.3IONS-SCNC: 8 MMOL/L (ref 4–13)
AST SERPL W P-5'-P-CCNC: 25 U/L (ref 5–45)
BERMUDA GRASS IGE QN: <0.1 KUA/I
BILIRUB SERPL-MCNC: 0.28 MG/DL (ref 0.2–1)
BOXELDER IGE QN: <0.1 KUA/I
BUN SERPL-MCNC: 11 MG/DL (ref 5–25)
C HERBARUM IGE QN: <0.1 KUA/I
CALCIUM SERPL-MCNC: 8.7 MG/DL (ref 8.3–10.1)
CAT DANDER IGE QN: 1.08 KUA/I
CHLORIDE SERPL-SCNC: 104 MMOL/L (ref 100–108)
CMN PIGWEED IGE QN: <0.1 KUA/I
CO2 SERPL-SCNC: 24 MMOL/L (ref 21–32)
COMMON RAGWEED IGE QN: <0.1 KUA/I
COTTONWOOD IGE QN: <0.1 KUA/I
CREAT SERPL-MCNC: 0.61 MG/DL (ref 0.6–1.3)
D FARINAE IGE QN: <0.1 KUA/I
D PTERONYSS IGE QN: <0.1 KUA/I
DOG DANDER IGE QN: 1.76 KUA/I
ERYTHROCYTE [DISTWIDTH] IN BLOOD BY AUTOMATED COUNT: 12.2 % (ref 11.6–15.1)
EST. AVERAGE GLUCOSE BLD GHB EST-MCNC: 108 MG/DL
GFR SERPL CREATININE-BSD FRML MDRD: 106 ML/MIN/1.73SQ M
GLUCOSE SERPL-MCNC: 123 MG/DL (ref 65–140)
HBA1C MFR BLD: 5.4 %
HCT VFR BLD AUTO: 40.5 % (ref 34.8–46.1)
HGB BLD-MCNC: 13.6 G/DL (ref 11.5–15.4)
LONDON PLANE IGE QN: <0.1 KUA/I
MCH RBC QN AUTO: 32.2 PG (ref 26.8–34.3)
MCHC RBC AUTO-ENTMCNC: 33.6 G/DL (ref 31.4–37.4)
MCV RBC AUTO: 96 FL (ref 82–98)
MOUSE URINE PROT IGE QN: <0.1 KUA/I
MT JUNIPER IGE QN: <0.1 KUA/I
MUGWORT IGE QN: <0.1 KUA/I
P NOTATUM IGE QN: <0.1 KUA/I
PLATELET # BLD AUTO: 294 THOUSANDS/UL (ref 149–390)
PMV BLD AUTO: 9.6 FL (ref 8.9–12.7)
POTASSIUM SERPL-SCNC: 3.8 MMOL/L (ref 3.5–5.3)
PROT SERPL-MCNC: 7.3 G/DL (ref 6.4–8.2)
RBC # BLD AUTO: 4.23 MILLION/UL (ref 3.81–5.12)
ROACH IGE QN: <0.1 KUA/I
SHEEP SORREL IGE QN: <0.1 KUA/I
SILVER BIRCH IGE QN: <0.1 KUA/I
SODIUM SERPL-SCNC: 136 MMOL/L (ref 136–145)
TIMOTHY IGE QN: <0.1 KUA/I
TOTAL IGE SMQN RAST: 79.9 KU/L (ref 0–113)
WALNUT IGE QN: 0.55 KUA/I
WBC # BLD AUTO: 12.82 THOUSAND/UL (ref 4.31–10.16)
WHITE ASH IGE QN: <0.1 KUA/I
WHITE ELM IGE QN: <0.1 KUA/I
WHITE MULBERRY IGE QN: <0.1 KUA/I
WHITE OAK IGE QN: <0.1 KUA/I

## 2022-02-15 PROCEDURE — 99217 PR OBSERVATION CARE DISCHARGE MANAGEMENT: CPT | Performed by: INTERNAL MEDICINE

## 2022-02-15 PROCEDURE — 83036 HEMOGLOBIN GLYCOSYLATED A1C: CPT | Performed by: PHYSICIAN ASSISTANT

## 2022-02-15 PROCEDURE — 85027 COMPLETE CBC AUTOMATED: CPT | Performed by: PHYSICIAN ASSISTANT

## 2022-02-15 PROCEDURE — 97165 OT EVAL LOW COMPLEX 30 MIN: CPT

## 2022-02-15 PROCEDURE — 80053 COMPREHEN METABOLIC PANEL: CPT | Performed by: PHYSICIAN ASSISTANT

## 2022-02-15 RX ORDER — FAMOTIDINE 20 MG/1
20 TABLET, FILM COATED ORAL DAILY
Status: DISCONTINUED | OUTPATIENT
Start: 2022-02-15 | End: 2022-02-15 | Stop reason: HOSPADM

## 2022-02-15 RX ORDER — PREDNISONE 20 MG/1
40 TABLET ORAL DAILY
Qty: 10 TABLET | Refills: 0 | Status: SHIPPED | OUTPATIENT
Start: 2022-02-16 | End: 2022-02-21

## 2022-02-15 RX ORDER — LABETALOL 20 MG/4 ML (5 MG/ML) INTRAVENOUS SYRINGE
10 EVERY 4 HOURS PRN
Status: DISCONTINUED | OUTPATIENT
Start: 2022-02-15 | End: 2022-02-15 | Stop reason: HOSPADM

## 2022-02-15 RX ORDER — EPINEPHRINE 0.3 MG/.3ML
0.3 INJECTION SUBCUTANEOUS ONCE
Qty: 0.6 ML | Refills: 0 | Status: SHIPPED | OUTPATIENT
Start: 2022-02-15 | End: 2022-02-18

## 2022-02-15 RX ORDER — NAPROXEN 250 MG/1
500 TABLET ORAL 2 TIMES DAILY PRN
Status: DISCONTINUED | OUTPATIENT
Start: 2022-02-15 | End: 2022-02-15 | Stop reason: HOSPADM

## 2022-02-15 RX ORDER — FAMOTIDINE 20 MG/1
20 TABLET, FILM COATED ORAL DAILY
Qty: 30 TABLET | Refills: 0 | Status: SHIPPED | OUTPATIENT
Start: 2022-02-15

## 2022-02-15 RX ADMIN — LABETALOL HYDROCHLORIDE 10 MG: 5 INJECTION, SOLUTION INTRAVENOUS at 01:28

## 2022-02-15 RX ADMIN — ASPIRIN 81 MG: 81 TABLET, CHEWABLE ORAL at 08:53

## 2022-02-15 RX ADMIN — FAMOTIDINE 20 MG: 20 TABLET ORAL at 09:00

## 2022-02-15 RX ADMIN — AMLODIPINE BESYLATE 2.5 MG: 2.5 TABLET ORAL at 08:53

## 2022-02-15 RX ADMIN — ENOXAPARIN SODIUM 40 MG: 40 INJECTION SUBCUTANEOUS at 08:53

## 2022-02-15 RX ADMIN — PREDNISONE 40 MG: 20 TABLET ORAL at 08:53

## 2022-02-15 NOTE — DISCHARGE INSTRUCTIONS
Anaphylaxis   WHAT YOU NEED TO KNOW:   Anaphylaxis is a life-threatening allergic reaction that must be treated immediately  Your risk for anaphylaxis increases if you have asthma that is severe or not controlled  Medical conditions such as heart disease can also increase your risk  It is important to be prepared if you are at risk for anaphylaxis  Your symptoms can be worse each time you are exposed to the trigger  DISCHARGE INSTRUCTIONS:   Steps to take for signs or symptoms of anaphylaxis:   · Immediately give 1 shot of epinephrine  only into the outer thigh muscle  Even if your allergic reaction seems mild, it can quickly become anaphylaxis  This may happen even if you had a mild reaction to the allergen in the past  Each exposure can cause a different reaction  Watch for signs and symptoms of anaphylaxis every time you are exposed to a trigger  Be ready to give a shot of epinephrine  It is okay to inject epinephrine through clothing  Just be careful to avoid seams, zippers, or other parts that can prevent the needle from entering your skin  · Leave the shot in place  as directed  Your healthcare provider may recommend you leave it in place for up to 10 seconds before you remove it  This helps make sure all of the epinephrine is delivered  · Call 911 and go to the emergency department,  even if the shot improved symptoms  Do not drive yourself  Bring the used epinephrine shot with you  Call 911 for any of the following:   · You have a skin rash, hives, swelling, or itching  · You have trouble breathing, shortness of breath, wheezing, or coughing  · Your throat tightens or your lips or tongue swell  · You have difficulty swallowing or speaking  · You are dizzy, lightheaded, confused, or feel like you are going to faint  · You have nausea, diarrhea, or abdominal cramps, or you are vomiting  Seek care immediately if:   · Signs or symptoms of anaphylaxis return  Contact your healthcare provider if:   · You have questions or concerns about your condition or care  Medicines:   · Epinephrine  is medicine used to treat severe allergic reactions such as anaphylaxis  It is given as a shot into the outer thigh muscle  · Medicines  such as antihistamines, steroids, and bronchodilators decrease inflammation, open airways, and make breathing easier  · Take your medicine as directed  Contact your healthcare provider if you think your medicine is not helping or if you have side effects  Tell him or her if you are allergic to any medicine  Keep a list of the medicines, vitamins, and herbs you take  Include the amounts, and when and why you take them  Bring the list or the pill bottles to follow-up visits  Carry your medicine list with you in case of an emergency  Follow up with your healthcare provider as directed: Allergy testing may reveal allergies that can trigger anaphylaxis  Write down your questions so you remember to ask them during your visits  Safety precautions:   · Keep 2 shots of epinephrine with you at all times  You may need a second shot, because epinephrine only works for about 20 minutes and symptoms may return  Your healthcare provider can show you and family members how to give the shot  Check the expiration date every month and replace it before it expires  · Create an action plan  Your healthcare provider can help you create a written plan that explains the allergy and an emergency plan to treat a reaction  The plan explains when to give a second epinephrine shot if symptoms return or do not improve after the first  Give copies of the action plan and emergency instructions to family members, and work or school staff  Show them how to give a shot of epinephrine in case you are not able to give it to yourself  · Be careful when you exercise  If you have had exercise-induced anaphylaxis, do not exercise right after you eat   Stop exercising right away if you start to develop any signs or symptoms of anaphylaxis  You may first feel tired, warm, or have itchy skin  Hives, swelling, and severe breathing problems may develop if you continue to exercise  · Carry medical alert identification  Wear medical alert jewelry or carry a card that explains the allergy  Ask your healthcare provider where to get these items  · Identify and avoid known triggers  Read food labels for ingredients  Look for triggers in your environment  · Ask about treatments to prevent anaphylaxis  You may need allergy shots or other medicines to treat allergies  © Copyright [employer] 2021 Information is for End User's use only and may not be sold, redistributed or otherwise used for commercial purposes  All illustrations and images included in CareNotes® are the copyrighted property of A D A M , Inc  or Sharmaine Crawford   The above information is an  only  It is not intended as medical advice for individual conditions or treatments  Talk to your doctor, nurse or pharmacist before following any medical regimen to see if it is safe and effective for you  Allergies, Ambulatory Care   GENERAL INFORMATION:   Allergies  are an immune system reaction to a substance called an allergen  Your immune system sees the allergen as harmful and attacks it  Common symptoms include the following:   · Sneezing and runny, itchy, or stuffy nose    · Swollen, watery, or itchy eyes    · Itchy skin, mouth, ears, or throat    · Swelling, pain, or itch at the site of an insect sting  Seek immediate care for the following symptoms:   · Trouble swallowing or your throat or tongue is swollen    · Wheezing or trouble breathing    · Dizziness or feeling faint    · Chest pain or your heart is fluttering  Treatment for allergies  may include medicines to slow a serious allergic reaction   You may be given medicines that help decrease itching, sneezing, and swelling or help your nose feel less stuffy  Your healthcare provider may give you several different medicines to help decrease swelling, redness, and itching  Medicines may be given as pills, shots, or put directly on your skin  Nasal sprays or eye drops may also be used  Desensitization treatment may get your body used to allergens you cannot avoid  Your healthcare provider will give you a shot that contains a small amount of an allergen, giving a little more each time until your body gets used to it  Your healthcare provider will watch you closely and treat any allergic reaction you have  Your reaction to the allergen may be less serious after this treatment  Ask your healthcare provider how long you need to get the shots  Manage allergies:   · Use nasal rinses  Healthcare providers may suggest that you rinse your nasal passages with a saline solution  Daily rinsing may help clear your nose of allergens  · Do not smoke  Your allergy symptoms may decrease if you are not around smoke  If you smoke, it is never too late to quit  Ask your healthcare provider for information about how to stop if you need help quitting  · Carry medical alert identification  You may want to wear medical alert jewelry or carry a card that says you have an allergy  Ask your healthcare provider where to get medical alert identification  Prevent allergic reactions:   · Avoid seasonal allergic reactions  Do not go outside when pollen counts are high  Your symptoms may be better if you go outside only in the morning or evening  Use your air conditioner and change air filters often  · Dust and vacuum your home often  to avoid allergic reactions to dust, fur, or mold  You may want to wear a mask when you vacuum  Keep pets in certain rooms and bathe them often  Use a dehumidifier (machine that decreases moisture) to help prevent mold  · Do not use products that contain latex  if you have a latex allergy   Use nonlatex gloves if you work in healthcare or in food preparation  Always tell healthcare providers if you have a latex allergy  · Avoid insect stings  Stay away from areas or activities that increase your risk for being stung  These include trash cans, gardening, and picnics  Do not wear bright clothing or strong scents when you will be outside  Follow up with your healthcare provider as directed:  Write down your questions so you remember to ask them during your visits  CARE AGREEMENT:   You have the right to help plan your care  Learn about your health condition and how it may be treated  Discuss treatment options with your caregivers to decide what care you want to receive  You always have the right to refuse treatment  The above information is an  only  It is not intended as medical advice for individual conditions or treatments  Talk to your doctor, nurse or pharmacist before following any medical regimen to see if it is safe and effective for you  © 2014 [address] is for End User's use only and may not be sold, redistributed or otherwise used for commercial purposes  All illustrations and images included in CareNotes® are the copyrighted property of [employer] , Inc  or Rich Chavez

## 2022-02-15 NOTE — UTILIZATION REVIEW
Initial Clinical Review    Admission: Date/Time/Statement:   Admission Orders (From admission, onward)     Ordered        02/14/22 1223  Place in Observation  Once                      Orders Placed This Encounter   Procedures    Place in Observation     Standing Status:   Standing     Number of Occurrences:   1     Order Specific Question:   Level of Care     Answer:   Med Surg [16]     ED Arrival Information     Expected Arrival Acuity    - 2/14/2022 09:30 Emergent         Means of arrival Escorted by Service Admission type    [facility] Emergency         Arrival complaint    Allergic Reaction, Hives        Chief Complaint   Patient presents with    Allergic Reaction     Pt reports waking up with hives, difficulty breathing and dizziness  Pt reports feeling better after using albuterol       Initial Presentation:   49  Y O female presents to ED  From home with shortness of breath, airway swelling  PMH is  Asthma  On ED arrival, felt  To have an allergic reaction, no known triggers  Tachycardic  Initially  EKG  Changes noted  ERIKA score  0  Low  Clinical suspicion for  ACS,  Admit Observation with  Tachycardia, Allergic reaction and  RAD  And plan is  Tele,  Start prednisone and trend troponin            ED Triage Vitals [02/14/22 0940]   Temperature Pulse Respirations Blood Pressure SpO2   97 6 °F (36 4 °C) 93 20 (!) 204/113 97 %      Temp Source Heart Rate Source Patient Position - Orthostatic VS BP Location FiO2 (%)   Oral Monitor Sitting Right arm --      Pain Score       No Pain          Wt Readings from Last 1 Encounters:   02/14/22 86 kg (189 lb 9 5 oz)     Additional Vital Signs:    98 -- 137/79 98 94 % -- --    02/15/22 0100 -- -- -- 180/78 Abnormal  -- -- -- --   02/14/22 22:56:18 97 9 °F (36 6 °C) 104 18 -- -- 94 % -- --   02/14/22 2100 -- -- -- 180/74 Abnormal  -- -- -- --   02/14/22 1723 -- -- -- 162/86 -- -- -- --   02/14/22 15:47:29 99 °F (37 2 °C) 92 -- 181/93 Abnormal  122 93 % -- --   02/14/22 1315 -- 102 20 122/72 -- 93 % None (Room air) Sitting   02/14/22 1100 -- 118 Abnormal  20 173/96 Abnormal  128 96 % None (Room air) Lying   02/14/22 1000 -- -- -- -- -- -- None (Room air) --   02/14/22 0940 97 6 °F (36 4 °C) 93 20 204/113 Abnormal  -- 97 % None (Room air) Sitting       Pertinent Labs/Diagnostic Test Results:   CXR  ( 2/14)    NAD  EKG    New T wave  Inversions inferiorly/laterally      Results from last 7 days   Lab Units 02/14/22  1514 02/14/22  1125   WBC Thousand/uL  --  21 74*   HEMOGLOBIN g/dL  --  13 3   HEMATOCRIT %  --  40 9   PLATELETS Thousands/uL 297 335   NEUTROS ABS Thousands/µL  --  15 87*         Results from last 7 days   Lab Units 02/14/22  1125   SODIUM mmol/L 138   POTASSIUM mmol/L 3 2*   CHLORIDE mmol/L 103   CO2 mmol/L 20*   ANION GAP mmol/L 15*   BUN mg/dL 10   CREATININE mg/dL 0 71   EGFR ml/min/1 73sq m 99   CALCIUM mg/dL 7 8*             Results from last 7 days   Lab Units 02/14/22  1125   GLUCOSE RANDOM mg/dL 219*           Results from last 7 days   Lab Units 02/14/22  1514 02/14/22  1313 02/14/22  1125   HS TNI 0HR ng/L  --   --  3   HS TNI 2HR ng/L  --  9  --    HSTNI D2 ng/L  --  6  --    HS TNI 4HR ng/L 10  --   --    HSTNI D4 ng/L 7  --   --          ED Treatment:   Medication Administration from 02/14/2022 0930 to 02/14/2022 1503       Date/Time Order Dose Route Action Comments     02/14/2022 1010 EPINEPHrine PF (ADRENALIN) 1 mg/mL injection 0 3 mg 0 3 mg Intramuscular Given      02/14/2022 1005 famotidine (PEPCID) injection 20 mg 20 mg Intravenous Given      02/14/2022 1214 multi-electrolyte (ISOLYTE-S PH 7 4) bolus 500 mL 0 mL Intravenous Stopped      02/14/2022 1014 multi-electrolyte (ISOLYTE-S PH 7 4) bolus 500 mL 500 mL Intravenous New Bag      02/14/2022 1004 diphenhydrAMINE (BENADRYL) injection 25 mg 25 mg Intravenous Given      02/14/2022 1006 dexamethasone (DECADRON) injection 8 mg 8 mg Intravenous Given      02/14/2022 1008 albuterol inhalation solution 2 5 mg 2 5 mg Nebulization Given      02/14/2022 1314 potassium chloride (K-DUR,KLOR-CON) CR tablet 40 mEq 40 mEq Oral Given         Present on Admission:   Reactive airway disease      Admitting Diagnosis: Anaphylaxis [T78  2XXA]  Rash [R21]  Allergic reaction [T78 40XA]  SOB (shortness of breath) [R06 02]  Tachycardia [R00 0]  Abnormal EKG [R94 31]  Age/Sex: 49 y o  female  Admission Orders:  Scheduled Medications:  amLODIPine, 2 5 mg, Oral, Daily  aspirin, 81 mg, Oral, Daily  atorvastatin, 40 mg, Oral, QPM  enoxaparin, 40 mg, Subcutaneous, Daily  Naltrexone-buPROPion HCl ER, 1 tablet, Oral, Daily  predniSONE, 40 mg, Oral, Daily      Continuous IV Infusions:     PRN Meds:  acetaminophen, 650 mg, Oral, Q6H PRN  ALPRAZolam, 0 25 mg, Oral, HS PRN  hydrALAZINE, 10 mg, Intravenous, Q6H PRN  Labetalol HCl, 10 mg, Intravenous, Q4H PRN  naproxen, 500 mg, Oral, BID PRN  ondansetron, 4 mg, Intravenous, Q6H PRN        Network Utilization Review Department  ATTENTION: Please call with any questions or concerns to [phone] and carefully listen to the prompts so that you are directed to the right person  All voicemails are confidential   [facility] all requests for admission clinical reviews, approved or denied determinations and any other requests to dedicated fax number below belonging to the campus where the patient is receiving treatment   List of dedicated fax numbers for the Facilities:  [address] DENIALS (Administrative/Medical Necessity) [phone]   [address] (Maternity/NICU/Pediatrics) [phone]   [address] [phone]   [address]  [address] Medical [facility] Connor[facility] [place] [address] [facility] [phone] [address] [place] [address] [address] [place] [address] [phone]

## 2022-02-15 NOTE — OCCUPATIONAL THERAPY NOTE
Occupational Therapy Evaluation     Patient Name: Reina Cain  [id number] Date: 2/15/2022  Problem List  Principal Problem:    Tachycardia  Active Problems:    Primary hypertension    Reactive airway disease    Allergic reaction    Past Medical History  Past Medical History:   Diagnosis Date    Anemia     resolved 12/08/16    Anxiety     Asthma     Depression     Hypertension     Mental disorder     Trichomonal vulvovaginitis     last assessed 02/14/13     Past Surgical History  Past Surgical History:   Procedure Laterality Date    APPENDECTOMY      CA LAPAROSCOPY W TOT HYSTERECTUTERUS <=250 GRAM  W TUBE/OVARY N/A 5/31/2018    Procedure: ROBOTIC TOTAL LAPAROSCOPIC HYSTERECTOMY,    BILATERAL SALPINGECTOMY AND INTRA-OP CYSTO;  Surgeon: Michael Goldstein MD;  Location: [facility];  Service: Gynecology Oncology    TUBAL LIGATION             02/15/22 1112   OT Last Visit   OT Visit Date 02/15/22   Note Type   Note type Evaluation   Restrictions/Precautions   Weight Bearing Precautions Per Order No   Braces or Orthoses Other (Comment)  (Wears soft knee brace L knee 2* arthritis)   Other Precautions Pain   Pain Assessment   Pain Assessment Tool 0-10   Pain Score No Pain   Home Living   Type of Home Apartment   Home Layout One level  (0 HEIDY)   Bathroom Shower/Tub Tub/shower unit   Bathroom Toilet Standard   Bathroom Equipment   (Denies DME)   [address]   (Denies DME)   Additional Comments Pt lives with spouse in a one level apt with 0 HEIDY  Prior Function   Level of [place] Independent with ADLs and functional mobility   Lives With Spouse   ADL Assistance Independent   IADLs Independent   Falls in the last 6 months 0   Vocational Full time employment   Comments At baseline, pt was I w/ ADLs, IADLs, and functional transfers/mobility w/o use of AD  (+)   FT employed  Denies falls PTA     Lifestyle   Autonomy At baseline, pt was I w/ ADLs, IADLs, and functional transfers/mobility w/o use of AD  (+)   FT employed  Denies falls PTA  Reciprocal Relationships Spouse   Service to Others FT employed   Intrinsic Gratification Traveling   Psychosocial   Psychosocial (WDL) WDL   ADL   Where Assessed Edge of bed   Eating Assistance 7  Independent   Grooming Assistance 7  Independent   UB Bathing Assistance 6  Modified Independent   LB Bathing Assistance 6  Modified Independent   UB Dressing Assistance 6  Modified independent   LB Dressing Assistance 6  Modified independent   Toileting Assistance  6  Modified independent   Functional Assistance 6  Modified independent   Additional Comments Pt denies concerns regarding ADLs, reports functioning near baseline level of performance   Bed Mobility   Supine to Sit 6  Modified independent   Additional items Bedrails;HOB elevated; Increased time required   Sit to Supine 6  Modified independent   Additional items Increased time required   Additional Comments Pt lying supine at end of session w/ call bell and phone within reach  All needs met and pt reports no further questions for OT at this time   Transfers   Sit to Stand 7  Independent   Stand to Sit 7  Independent   Functional Mobility   Functional Mobility 7  Independent   Additional Comments w/o use of AD  No LOBs noted   Balance   Static Sitting Normal   Dynamic Sitting Good   Static Standing Fair +   Dynamic Standing Fair +   Ambulatory Fair +   Activity Tolerance   Activity Tolerance Patient tolerated treatment well   Medical Staff Olimpia Ivey, PT   Nurse Made Aware yes;  Radha Jaffe RN   RUE Assessment   RUE Assessment WFL   RUE Strength   RUE Overall Strength Within Functional Limits - able to perform ADL tasks with strength  (4+/5 throughout)   LUE Assessment   LUE Assessment WFL   LUE Strength   LUE Overall Strength Within Functional Limits - able to perform ADL tasks with strength  (4+/5 throughout)   Hand Function   Gross Motor Coordination Functional   Fine Motor Coordination Functional   Sensation   Light Touch Partial deficits in the RUE;Partial deficits in the LUE   Additional Comments Pt reports occasional numbness/tingling in B/L UEs    Proprioception   Proprioception No apparent deficits   Vision-Basic Assessment   Current Vision Wears glasses only for reading   Vision - Complex Assessment   Ocular Range of Motion [facility]   Acuity Able to read clock/calendar on wall without difficulty; Able to read employee name badge without difficulty   Perception   Inattention/Neglect Appears intact   Cognition   Overall Cognitive Status [facility]   Arousal/Participation Alert; Cooperative   Attention Within functional limits   Orientation Level Oriented X4   Memory Within functional limits   Following Commands Follows all commands and directions without difficulty   Comments Pleasant and cooperative  Engages in conversation appropriately   Assessment   Prognosis Good   Assessment Pt is a 49 y o  female seen for OT evaluation s/p adm to [address] on 2/14/2022 w/ SOB and worse airway swelling  Pt admitted w/ Tachycardia   Comorbidities affecting pts functional performance include a significant PMH of Anemia, Anxiety, Asthma, Depression, HTN  Pt with active OT orders  Pt lives with spouse in a one level apt with 0 HEIDY  At baseline, pt was I w/ ADLs, IADLs, and functional transfers/mobility w/o use of AD  (+)   FT employed  Denies falls PTA  Upon evaluation, pt currently functioning at a Mod I level for overall ADLs, Mod I for bed mobility, and Independent for functional mobility/transfers 2* the following deficits impacting occupational performance: decreased tolerance and increased pain  These impairments, however do not limit pts ability to safely engage in all baseline areas of occupation, as pt is functioning near baseline level of functioning at denies concerns regarding returning home and performing ADLs   The patient's raw score on the AM-PAC Daily Activity inpatient short form is 24, standardized score is 57 54, greater than 39 4  Patients at this level are likely to benefit from discharge to home  Please refer to the recommendation of the Occupational Therapist for safe discharge planning  Based on the aforementioned OT evaluation, functional performance deficits, and assessments, pt has been identified as a low complexity evaluation  No further acute OT needs identified at this time  Recommend continued mobilization with hospital staff while in the hospital to increase pts endurance and strength upon D/C  From OT standpoint, recommend D/C home with family support when medically cleared  D/C pt from OT caseload at this time      Goals   Patient Goals To go home    Plan   OT Frequency Eval only  (D/C OT)   Recommendation   OT Discharge Recommendation No rehabilitation needs   OT - OK to Discharge Yes  (when medically cleared)   AM-PAC Daily Activity Inpatient   Lower Body Dressing 4   Bathing 4   Toileting 4   Upper Body Dressing 4   Grooming 4   Eating 4   Daily Activity Raw Score 24   Daily Activity Standardized Score (Calc for Raw Score >=11) 57 54   AM-PAC Applied Cognition Inpatient   Following a Speech/Presentation 4   Understanding Ordinary Conversation 4   Taking Medications 4   Remembering Where Things Are Placed or Put Away 4   Remembering List of 4-5 Errands 4   Taking Care of Complicated Tasks 4   Applied Cognition Raw Score 24   Applied Cognition Standardized Score 62 21        Gavin Quintanilla, OTR/L

## 2022-02-15 NOTE — QUICK NOTE
[facility]   [facility], [address] [place] [address]  (u) [phone] (r) [phone]  02/15/22      RE:  Linnea Espino 49 y o  female  : 1970    To whom it may concern,     Linnea Espino was hospitalized under my care from 2022 to 02/15/22  Please excuse from all appointments and/or work and school related activities during this interim  Patient may return to work at previous activity level on/or after , or when cleared by her PCP    Feel free to contact me with any questions if necessary  Sincerely,                 Pepito Fernando, [facility]

## 2022-02-15 NOTE — DISCHARGE SUMMARY
[address]  Discharge- Natalee Combs 1970, 49 y o  female MRN: [id number]  Unit/Bed#: E5 -01 Encounter: [id number]  Primary Care Provider:  Maikol Gonzales MD   Date and time admitted to hospital: 2/14/2022  9:41 AM    * Tachycardia  Assessment & Plan  Patient presenting with tachycardia in the setting of recent allergic reaction, epinephrine use, Decadron use  Low clinical suspicion for ACS  ERIKA score of 0  Troponins negative   EKG changes which were nonspecific and smiliar to prior  Recommend outpt stress test going forward  Appears pt had one ordered in august but never got it completed bc of lack of insurance  She is agreeable to followup with PCP now to have stress test set up    Allergic reaction  Assessment & Plan  Broke out in hives and felt short of breath  Unknown trigger - s/p epinephrine, Decadron with improvement  Started on Pepcid once a day daily  Closely monitored and remains stable with resolving rash  [place] [facility] Panel sent - pending upon discharge  Discharge on prednisone 40 milligrams for 5 days  Instructed to followup with allergist for official allergy testing  Will be sent home with an epipen also     Reactive airway disease  Assessment & Plan  Appears patient was taking steroids prior to this hospitalization  No evidence of exacerbation at this time  Will need to see allergist as an outpatient    Primary hypertension  Assessment & Plan  Patient was on amlodipine for a few years prior  She was taken off given controlled blood pressures  However restarted within the past week or so at 2 5 mg daily  Blood pressure is mildly elevated here  Would not make adjustments were started new medication given recent allergic reaction  Continue medication and follow up with PCP for further medication titration      Consultations During Hospital Stay:  · None    Procedures Performed:   XR chest 1 view portable  Result Date: 2/14/2022  · Impression: No acute cardiopulmonary disease  Findings are stable Workstation performed: [id number]     Significant Findings / Test Results:   · Acute allergic reaction  · Tachycardia    Incidental Findings:   · None    Test Results Pending at Discharge (will require follow up): · Allergy panel  · Hemoglobin A1c     Outpatient follow-up Requested:  · Allergy-establish care  · PCP -1 week for stress test    Complications:  None    Hospital Course: Cuate Mondragon is a 49 y o  female patient who originally presented to the hospital on 2/14/2022 due to hives, dizziness, and difficulty breathing  Patient was treated for an allergic reaction with unknown trigger  She developed tachycardia ER and was admitted overnight for further evaluation  She received IV Decadron and epinephrine with improvement of symptoms  She was started on Pepcid and oral prednisone  Her blood pressures were slightly elevated here and she was given p r n  Medication  Medication changes were not made given fluctuations and recent allergic reaction and she was instructed to followup with PCP in regards to further titration  She is asked to follow-up with allergist as an outpatient  She has also lost to follow-up with PCP to obtain a stress test given nonspecific EKG changes which were similar to prior  Troponins remained negative and she was without any chest pain or sob on the day of d c  She was supposed to have a stress test in August of 2021 but did not because of lack insurance  She will followup going forward  In conclusion, patient is stable for discharge at this time  Condition at Discharge: stable     Discharge Day Visit / Exam:     Subjective:  Patient resting comfortably in bed  Reports rash is resolved  She is breathing okay without difficulties  She is eating and drinking without difficulties  She feels back to her baseline  Still on clear trigger of allergic reaction  She is taking her medications here without difficulties    She is eager to go home today and understands importance of follow-up with allergist as an outpatient  Vitals: Blood Pressure: 160/100 (02/15/22 0700)  Pulse: 77 (02/15/22 0700)  Temperature: (!) 97 2 °F (36 2 °C) (02/15/22 0700)  Temp Source: Oral (02/15/22 0700)  Respirations: 18 (02/15/22 0700)  Weight - Scale: 86 kg (189 lb 9 5 oz) (02/14/22 0940)  SpO2: 96 % (02/15/22 0700)     Exam:   Physical Exam  Vitals and nursing note reviewed  Constitutional:       General: She is not in acute distress  Appearance: Normal appearance  She is obese  She is not ill-appearing, toxic-appearing or diaphoretic  HENT:      Head: Normocephalic and atraumatic  Eyes:      General: No scleral icterus  Cardiovascular:      Rate and Rhythm: Normal rate and regular rhythm  Pulmonary:      Effort: Pulmonary effort is normal  No respiratory distress  Breath sounds: Normal breath sounds  No stridor  No wheezing or rhonchi  Abdominal:      General: Bowel sounds are normal  There is no distension  Palpations: Abdomen is soft  There is no mass  Tenderness: There is no abdominal tenderness  Hernia: No hernia is present  Musculoskeletal:         General: No swelling  Cervical back: Neck supple  Skin:     General: Skin is warm and dry  Neurological:      Mental Status: She is alert and oriented to person, place, and time  Mental status is at baseline  Psychiatric:         Mood and Affect: Mood normal          Behavior: Behavior normal          Discharge instructions/Information to patient and family:   See after visit summary for information provided to patient and family  Provisions for Follow-Up Care:  See after visit summary for information related to follow-up care and any pertinent home health orders  Planned Readmission: no     Discharge Statement:  I spent 50 minutes discharging the patient  This time was spent on the day of discharge   I had direct contact with the patient on the day of discharge  Greater than 50% of the total time was spent examining patient, answering all patient questions, arranging and discussing plan of care with patient as well as directly providing post-discharge instructions  Additional time then spent on discharge activities  Discharge Medications:  See after visit summary for reconciled discharge medications provided to patient and family        ** Please Note: This note has been constructed using a voice recognition system **

## 2022-02-15 NOTE — PLAN OF CARE
Problem: PAIN - ADULT  Goal: Verbalizes/displays adequate comfort level or baseline comfort level  Description: Interventions:  - Encourage patient to monitor pain and request assistance  - Assess pain using appropriate pain scale  - Administer analgesics based on type and severity of pain and evaluate response  - Implement non-pharmacological measures as appropriate and evaluate response  - Consider cultural and social influences on pain and pain management  - Notify physician/advanced practitioner if interventions unsuccessful or patient reports new pain  Outcome: Progressing     Problem: INFECTION - ADULT  Goal: Absence or prevention of progression during hospitalization  Description: INTERVENTIONS:  - Assess and monitor for signs and symptoms of infection  - Monitor lab/diagnostic results  - Monitor all insertion sites, i e  indwelling lines, tubes, and drains  - Monitor endotracheal if appropriate and nasal secretions for changes in amount and color  - Marion appropriate cooling/warming therapies per order  - Administer medications as ordered  - Instruct and encourage patient and family to use good hand hygiene technique  - Identify and instruct in appropriate isolation precautions for identified infection/condition  Outcome: Progressing  Goal: Absence of fever/infection during neutropenic period  Description: INTERVENTIONS:  - Monitor WBC    Outcome: Progressing     Problem: DISCHARGE PLANNING  Goal: Discharge to home or other facility with appropriate resources  Description: INTERVENTIONS:  - Identify barriers to discharge w/patient and caregiver  - Arrange for needed discharge resources and transportation as appropriate  - Identify discharge learning needs (meds, wound care, etc )  - Arrange for interpretive services to assist at discharge as needed  - Refer to Case Management Department for coordinating discharge planning if the patient needs post-hospital services based on physician/advanced practitioner order or complex needs related to functional status, cognitive ability, or social support system  Outcome: Progressing     Problem: Knowledge Deficit  Goal: Patient/family/caregiver demonstrates understanding of disease process, treatment plan, medications, and discharge instructions  Description: Complete learning assessment and assess knowledge base    Interventions:  - Provide teaching at level of understanding  - Provide teaching via preferred learning methods  Outcome: Progressing     Problem: RESPIRATORY - ADULT  Goal: Achieves optimal ventilation and oxygenation  Description: INTERVENTIONS:  - Assess for changes in respiratory status  - Assess for changes in mentation and behavior  - Position to facilitate oxygenation and minimize respiratory effort  - Oxygen administered by appropriate delivery if ordered  - Initiate smoking cessation education as indicated  - Encourage broncho-pulmonary hygiene including cough, deep breathe, Incentive Spirometry  - Assess the need for suctioning and aspirate as needed  - Assess and instruct to report SOB or any respiratory difficulty  - Respiratory Therapy support as indicated  Outcome: Progressing

## 2022-02-15 NOTE — ASSESSMENT & PLAN NOTE
Patient presenting with tachycardia in the setting of recent allergic reaction, epinephrine use, Decadron use  Low clinical suspicion for ACS  ERIKA score of 0  Troponins negative   EKG changes which were nonspecific and smiliar to prior  Recommend outpt stress test going forward  Appears pt had one ordered in august but never got it completed bc of lack of insurance  She is agreeable to followup with PCP now to have stress test set up

## 2022-02-15 NOTE — ASSESSMENT & PLAN NOTE
Broke out in hives and felt short of breath  Unknown trigger - s/p epinephrine, Decadron with improvement  Started on Pepcid once a day daily  Closely monitored and remains stable with resolving rash  [place] [facility] Panel sent - pending upon discharge  Discharge on prednisone 40 milligrams for 5 days  Instructed to followup with allergist for official allergy testing

## 2022-02-15 NOTE — PHYSICAL THERAPY NOTE
Physical Therapy Screen Note         02/15/22 1111   PT Last Visit   PT Visit Date 02/15/22   Note Type   Note type Screen   Additional Comments per discussion w OT and observed ambulation on unit, pt functioning at indep level and does not demonstrate need for skilled IPPT at this time  will dc orders given current functional status and will be available for re-consultation if needed                   Misha Espinosa, PT

## 2022-02-15 NOTE — TELEPHONE ENCOUNTER
Petar Escalera (Key: [id number])      This request has received a Favorable outcome    Please note any additional information provided by [employer] at the bottom of this request

## 2022-02-15 NOTE — ASSESSMENT & PLAN NOTE
Patient was on amlodipine for a few years prior  She was taken off given controlled blood pressures  However restarted within the past week or so at 2 5 mg daily  Blood pressure is mildly elevated here  Would not make adjustments were started new medication given recent allergic reaction  Continue medication and follow up with PCP for further medication titration

## 2022-02-15 NOTE — ASSESSMENT & PLAN NOTE
Appears patient was taking steroids prior to this hospitalization  No evidence of exacerbation at this time  Will need to see allergist as an outpatient

## 2022-02-18 ENCOUNTER — OFFICE VISIT (OUTPATIENT)
Dept: FAMILY MEDICINE CLINIC | Facility: CLINIC | Age: 52
End: 2022-02-18
Payer: COMMERCIAL

## 2022-02-18 VITALS
BODY MASS INDEX: 34.93 KG/M2 | HEIGHT: 61 IN | WEIGHT: 185 LBS | SYSTOLIC BLOOD PRESSURE: 154 MMHG | HEART RATE: 100 BPM | TEMPERATURE: 97.1 F | DIASTOLIC BLOOD PRESSURE: 92 MMHG

## 2022-02-18 DIAGNOSIS — T78.40XS ALLERGIC REACTION, SEQUELA: Primary | ICD-10-CM

## 2022-02-18 DIAGNOSIS — T78.2XXS ANAPHYLAXIS, SEQUELA: ICD-10-CM

## 2022-02-18 PROBLEM — T78.2XXA ANAPHYLACTIC SYNDROME: Status: ACTIVE | Noted: 2022-02-18

## 2022-02-18 PROCEDURE — 3080F DIAST BP >= 90 MM HG: CPT | Performed by: FAMILY MEDICINE

## 2022-02-18 PROCEDURE — 1036F TOBACCO NON-USER: CPT | Performed by: FAMILY MEDICINE

## 2022-02-18 PROCEDURE — 3008F BODY MASS INDEX DOCD: CPT | Performed by: FAMILY MEDICINE

## 2022-02-18 PROCEDURE — 1111F DSCHRG MED/CURRENT MED MERGE: CPT | Performed by: FAMILY MEDICINE

## 2022-02-18 PROCEDURE — 3077F SYST BP >= 140 MM HG: CPT | Performed by: FAMILY MEDICINE

## 2022-02-18 PROCEDURE — 99214 OFFICE O/P EST MOD 30 MIN: CPT | Performed by: FAMILY MEDICINE

## 2022-02-18 NOTE — PROGRESS NOTES
Assessment and Plan:    Problem List Items Addressed This Visit        Other    Allergic reaction - Primary    Anaphylactic syndrome     Await allergy eval                      Diagnoses and all orders for this visit:    Allergic reaction, sequela    Anaphylaxis, sequela              Subjective:      Patient ID: Sagar Painter is a 49 y o  female  CC:    Chief Complaint   Patient presents with    Follow-up     Pt stetas she had a reaction, she did not know what is was from  PT states she had rash, chest pain and swelling that led her to go to ed  Pt states she has not started prednisone  HPI:    had allergic reaction to unknown substance, severe  Enough she got hives and anaphylaxis, got epi and prednisone, will be seeing allergist, had significant tachycardia from epi, went back to work 2 days ago, no more hives or dizziness      The following portions of the patient's history were reviewed and updated as appropriate: allergies, current medications, past family history, past medical history, past social history, past surgical history and problem list   TCM Call (since 1/18/2022)     None      TCM Call (since 1/18/2022)     None          Review of Systems   Constitutional: Negative for activity change, appetite change and fatigue  Respiratory: Negative for shortness of breath  Cardiovascular: Negative for chest pain  Skin: Negative for rash  Neurological: Negative for dizziness and headaches  Hematological: Negative for adenopathy  Psychiatric/Behavioral: The patient is not nervous/anxious  Data to review:       Objective:    Vitals:    02/18/22 0938   BP: 154/92   BP Location: Right arm   Patient Position: Sitting   Cuff Size: Adult   Pulse: 100   Temp: (!) 97 1 °F (36 2 °C)   TempSrc: Probe   Weight: 83 9 kg (185 lb)   Height: 5' 1" (1 549 m)        Physical Exam  Vitals reviewed  Constitutional:       Appearance: Normal appearance  She is obese     Cardiovascular:      Rate and Rhythm: Normal rate and regular rhythm  Pulses: Normal pulses  Heart sounds: Normal heart sounds  Pulmonary:      Effort: Pulmonary effort is normal       Breath sounds: Normal breath sounds  Musculoskeletal:      Right lower leg: No edema  Left lower leg: No edema  Lymphadenopathy:      Cervical: No cervical adenopathy  Neurological:      Mental Status: She is alert     Psychiatric:         Mood and Affect: Mood normal

## 2022-02-25 ENCOUNTER — TELEPHONE (OUTPATIENT)
Dept: DERMATOLOGY | Facility: CLINIC | Age: 52
End: 2022-02-25

## 2022-02-25 NOTE — TELEPHONE ENCOUNTER
Called patient from waitlist - ASAP referral - LVM that we have openings in our [facility] office the week of March 7th  Pt advised to call the office to make an appt  Please schedule with Dr Fabian Horan for week of 3/7

## 2022-03-21 DIAGNOSIS — F41.9 ANXIETY: ICD-10-CM

## 2022-03-21 DIAGNOSIS — M25.50 ARTHRALGIA, UNSPECIFIED JOINT: Primary | ICD-10-CM

## 2022-03-21 RX ORDER — ALPRAZOLAM 0.25 MG/1
0.25 TABLET ORAL
Qty: 15 TABLET | Refills: 0 | Status: SHIPPED | OUTPATIENT
Start: 2022-03-21

## 2022-03-21 RX ORDER — NAPROXEN 500 MG/1
500 TABLET ORAL 2 TIMES DAILY WITH MEALS
Qty: 60 TABLET | Refills: 2 | Status: SHIPPED | OUTPATIENT
Start: 2022-03-21 | End: 2022-06-17 | Stop reason: SDUPTHER

## 2022-03-21 NOTE — TELEPHONE ENCOUNTER
Last OV with Office: 2/18/2022   Last visit with PCP : Visit date not found      Next visit with the Office :3/24/2022   Next visit with PCP : Visit date not found

## 2022-04-13 ENCOUNTER — TELEPHONE (OUTPATIENT)
Dept: DERMATOLOGY | Facility: CLINIC | Age: 52
End: 2022-04-13

## 2022-04-13 NOTE — TELEPHONE ENCOUNTER
Pt left v/m needing to schedule an appt, pt has ASAP referral in chart, c/b but n/a, left v/m with c/b info

## 2022-06-17 DIAGNOSIS — M25.50 ARTHRALGIA, UNSPECIFIED JOINT: ICD-10-CM

## 2022-06-17 RX ORDER — NAPROXEN 500 MG/1
500 TABLET ORAL 2 TIMES DAILY WITH MEALS
Qty: 60 TABLET | Refills: 2 | Status: SHIPPED | OUTPATIENT
Start: 2022-06-17

## 2022-07-05 ENCOUNTER — HOSPITAL ENCOUNTER (EMERGENCY)
Facility: HOSPITAL | Age: 52
Discharge: HOME | End: 2022-07-05
Attending: EMERGENCY MEDICINE
Payer: COMMERCIAL

## 2022-07-05 VITALS
BODY MASS INDEX: 33.95 KG/M2 | DIASTOLIC BLOOD PRESSURE: 95 MMHG | OXYGEN SATURATION: 98 % | TEMPERATURE: 99 F | WEIGHT: 179.68 LBS | HEART RATE: 91 BPM | SYSTOLIC BLOOD PRESSURE: 202 MMHG | RESPIRATION RATE: 20 BRPM

## 2022-07-05 DIAGNOSIS — L30.9 DERMATITIS: Primary | ICD-10-CM

## 2022-07-05 DIAGNOSIS — R21 RASH: ICD-10-CM

## 2022-07-05 PROCEDURE — 99281 EMR DPT VST MAYX REQ PHY/QHP: CPT

## 2022-07-05 PROCEDURE — 99284 EMERGENCY DEPT VISIT MOD MDM: CPT | Performed by: EMERGENCY MEDICINE

## 2022-07-05 RX ORDER — CEPHALEXIN 250 MG/1
500 CAPSULE ORAL ONCE
Status: COMPLETED | OUTPATIENT
Start: 2022-07-05 | End: 2022-07-05

## 2022-07-05 RX ORDER — CEPHALEXIN 250 MG/1
500 CAPSULE ORAL 4 TIMES DAILY
Qty: 56 CAPSULE | Refills: 0 | Status: SHIPPED | OUTPATIENT
Start: 2022-07-05 | End: 2022-07-12

## 2022-07-05 RX ORDER — HYDROXYZINE HYDROCHLORIDE 25 MG/1
25 TABLET, FILM COATED ORAL EVERY 6 HOURS PRN
Qty: 12 TABLET | Refills: 0 | Status: SHIPPED | OUTPATIENT
Start: 2022-07-05 | End: 2022-07-08

## 2022-07-05 RX ORDER — TRIAMCINOLONE ACETONIDE 0.25 MG/G
CREAM TOPICAL 2 TIMES DAILY
Qty: 15 G | Refills: 0 | Status: SHIPPED | OUTPATIENT
Start: 2022-07-05 | End: 2022-07-12

## 2022-07-05 RX ORDER — TRIAMCINOLONE ACETONIDE 0.25 MG/G
CREAM TOPICAL ONCE
Status: COMPLETED | OUTPATIENT
Start: 2022-07-05 | End: 2022-07-05

## 2022-07-05 RX ADMIN — CEPHALEXIN 500 MG: 250 CAPSULE ORAL at 16:03

## 2022-07-05 RX ADMIN — TRIAMCINOLONE ACETONIDE 1 APPLICATION: 0.25 CREAM TOPICAL at 16:03

## 2022-07-05 NOTE — DISCHARGE INSTRUCTIONS
COMPLETE FULL COURSE OF ANTIBIOTICS   TAKE ACIDOPHILUS WHILE TAKING ANTIBIOTICS TO HELP PREVENT YEAST INFECTION  USE VITAMIN E CAPSULES ONCE RASH HAS HEALED  ANY DEVELOPMENT OF FEVERS, CHILLS, WORSENING RASH RETURN TO ER  DO NOT USE NEOSPORIN ON LESIONS  USE VERY SMALL AMOUNTS OF CREAM ON LESIONS

## 2022-07-05 NOTE — ED PROVIDER NOTES
History  Chief Complaint   Patient presents with   Jacqui Aleshia 84     Pt reports that she was on vacation over the weekend and now has generalized bed bites; patient reports that she check for bed bugs at [employer] and noone else on vacation has insect bites     Patient is a 78-year-old female with a history of hypertension, any Demi, anxiety, depression coming in today with rash that started 3 days ago  Patient states that her and her family went on vacation and stated [employer]  No other family members including her  have similar symptoms  She initially thought they were bed bugs that no one else has them  She states she has had bed bugs in the past but this is different  It started off with feels like a hard bump and then the open up  I was putting hand  and rubbing alcohol on it it seemed to make it worse  She has no recent sick contacts, recent antibiotic use  No new detergents, makeup, soaps, foods  She did try Benadryl with no relief  She has no lesions on her palms, soles or intraoral   She is maintaining airway and secretions  She is not on any and but epileptics  No exposure to sick contacts      History provided by:  Patient   used: No    Insect Bite  Contact animal:  Unable to specify  Animal bite location: "both my arms and legs"  Pain details:     Quality:  Itching, sore, dull and burning    Timing:  Constant    Progression:  Worsening  Incident location:  Unable to specify  Relieved by:  Nothing  Worsened by:  Nothing  Ineffective treatments:  Cold compresses and OTC medications  Associated symptoms: rash    Associated symptoms: no fever, no numbness and no swelling    Rash:     Location: bilateral arms and leg  Severity:  Moderate    Onset quality:  Gradual    Timing:  Constant    Progression:  Worsening      Prior to Admission Medications   Prescriptions Last Dose Informant Patient Reported? Taking?    ALPRAZolam (XANAX) 0 25 mg tablet   No No   Sig: Take 1 tablet (0 25 mg total) by mouth daily at bedtime as needed for anxiety   EPINEPHrine (EPIPEN) 0 3 mg/0 3 mL SOAJ   No No   Sig: Inject 0 3 mL (0 3 mg total) into a muscle once for 1 dose   Naltrexone-buPROPion HCl ER (Contrave) 8-90 MG TB12   No No   Sig: Take 1 tablet by mouth in the morning   amLODIPine (NORVASC) 2 5 mg tablet   No No   Sig: Take 1 tablet (2 5 mg total) by mouth daily   famotidine (PEPCID) 20 mg tablet   No No   Sig: Take 1 tablet (20 mg total) by mouth daily   naproxen (Naprosyn) 500 mg tablet   No No   Sig: Take 1 tablet (500 mg total) by mouth 2 (two) times a day with meals      Facility-Administered Medications: None       Past Medical History:   Diagnosis Date    Anemia     resolved 12/08/16    Anxiety     Asthma     Depression     Hypertension     Mental disorder     Trichomonal vulvovaginitis     last assessed 02/14/13       Past Surgical History:   Procedure Laterality Date    APPENDECTOMY      PA LAPAROSCOPY W TOT HYSTERECTUTERUS <=250 GRAM  W TUBE/OVARY N/A 5/31/2018    Procedure: ROBOTIC TOTAL LAPAROSCOPIC HYSTERECTOMY,    BILATERAL SALPINGECTOMY AND INTRA-OP CYSTO;  Surgeon: Arthur Guzmán MD;  Location:  MAIN OR;  Service: Gynecology Oncology    TUBAL LIGATION         Family History   Problem Relation Age of Onset    Hypertension Mother     Cancer Mother     No Known Problems Father     No Known Problems Sister     No Known Problems Maternal Grandmother     No Known Problems Maternal Grandfather     No Known Problems Paternal Grandmother     No Known Problems Paternal Grandfather      I have reviewed and agree with the history as documented  E-Cigarette/Vaping     E-Cigarette/Vaping Substances     Social History     Tobacco Use    Smoking status: Never Smoker    Smokeless tobacco: Never Used   Substance Use Topics    Alcohol use:  Yes     Alcohol/week: 4 0 standard drinks     Types: 4 Glasses of wine per week    Drug use: No       Review of Systems Constitutional: Negative  Negative for chills and fever  HENT: Negative  Negative for ear pain and sore throat  Eyes: Negative  Negative for pain and visual disturbance  Respiratory: Negative  Negative for cough and shortness of breath  Cardiovascular: Negative  Negative for chest pain and palpitations  Gastrointestinal: Negative  Negative for abdominal pain and vomiting  Genitourinary: Negative  Negative for dysuria and hematuria  Musculoskeletal: Negative  Negative for arthralgias and back pain  Skin: Positive for rash  Negative for color change  Neurological: Negative  Negative for seizures, syncope and numbness  Hematological: Negative  All other systems reviewed and are negative  Physical Exam  Physical Exam  Vitals and nursing note reviewed  Constitutional:       General: She is not in acute distress  Appearance: She is well-developed  HENT:      Head: Normocephalic and atraumatic  Eyes:      Extraocular Movements: Extraocular movements intact  Conjunctiva/sclera: Conjunctivae normal       Pupils: Pupils are equal, round, and reactive to light  Cardiovascular:      Rate and Rhythm: Normal rate and regular rhythm  Heart sounds: No murmur heard  Pulmonary:      Effort: Pulmonary effort is normal  No respiratory distress  Breath sounds: Normal breath sounds  Abdominal:      Palpations: Abdomen is soft  Tenderness: There is no abdominal tenderness  Musculoskeletal:         General: Normal range of motion  Cervical back: Neck supple  Skin:     General: Skin is warm and dry  Capillary Refill: Capillary refill takes less than 2 seconds  Comments: There is noted diffuse lesions no greater than 1 cm in diameter  There are varying stages throughout the bilateral upper extremities and lower extremities volar and dorsal surfaces  There is no palmar, solar or intraoral lesions    They are varied from injury did, some with crusting, and some arm nodular papular  There is no urticaria, petechia, vesicular lesions  There is no ecchymosis or petechiae   Neurological:      General: No focal deficit present  Mental Status: She is alert and oriented to person, place, and time  GCS: GCS eye subscore is 4  GCS verbal subscore is 5  GCS motor subscore is 6  Cranial Nerves: Cranial nerves are intact  Sensory: Sensation is intact  Motor: Motor function is intact  Coordination: Coordination is intact  Gait: Gait is intact  Psychiatric:         Mood and Affect: Mood normal          Behavior: Behavior normal          Thought Content: Thought content normal          Judgment: Judgment normal          Vital Signs  ED Triage Vitals   Temperature Pulse Respirations Blood Pressure SpO2   07/05/22 1451 07/05/22 1451 07/05/22 1451 07/05/22 1452 07/05/22 1451   99 °F (37 2 °C) 91 20 (!) 202/95 98 %      Temp Source Heart Rate Source Patient Position - Orthostatic VS BP Location FiO2 (%)   07/05/22 1451 -- -- -- --   Oral          Pain Score       --                  Vitals:    07/05/22 1451 07/05/22 1452   BP:  (!) 202/95   Pulse: 91          Visual Acuity      ED Medications  Medications - No data to display    Diagnostic Studies  Results Reviewed     None                 No orders to display              Procedures  Procedures         ED Course  ED Course as of 07/05/22 [address]  Jul 05, 2022   1540 Patient Is a 50-year-old female coming in today with a rash  On exam patient has diffuse rash of varying stages throughout the bilateral upper extremities and lower extremities  There is no intraoral lesions or lesions on palms or soles  She is nontoxic appearing  She does have high blood pressure but asymptomatic at this time  Discussed with patient this rash does not appear of erythema migrans, shingles, he managed spotting fever, scabies, does not appear meningitic    Does not appear as fascicular lesions/monkeypox/chicken pox    Long discussion with patient that at this time etiology is unknown however will treat with antibiotics given patient has been placing alcohol and itching with subsequent small areas of infection  Will also treat which triamcinolone  Long discussion with patient      Portions of the record may have been created with voice recognition software  Occasional wrong word or "sound a like" substitutions may have occurred due to the inherent limitations of voice recognition software  Read the chart carefully and recognize, using context, where substitutions have occurred  MDM    Disposition  Final diagnoses:   None     ED Disposition     None      Follow-up Information    None         Patient's Medications   Discharge Prescriptions    No medications on file       No discharge procedures on file      PDMP Review       Value Time User    PDMP Reviewed  Yes 3/21/2022 11:58 AM Michaela Umaña MD          ED Provider  Electronically Signed by           Allison Leblanc DO  07/05/22 4869

## 2022-08-12 DIAGNOSIS — I10 PRIMARY HYPERTENSION: ICD-10-CM

## 2022-08-12 RX ORDER — AMLODIPINE BESYLATE 2.5 MG/1
2.5 TABLET ORAL DAILY
Qty: 30 TABLET | Refills: 2 | Status: SHIPPED | OUTPATIENT
Start: 2022-08-12 | End: 2022-11-10

## 2022-09-26 DIAGNOSIS — M25.50 ARTHRALGIA, UNSPECIFIED JOINT: ICD-10-CM

## 2022-09-26 RX ORDER — NAPROXEN 500 MG/1
500 TABLET ORAL 2 TIMES DAILY WITH MEALS
Qty: 60 TABLET | Refills: 0 | Status: SHIPPED | OUTPATIENT
Start: 2022-09-26

## 2022-09-26 NOTE — TELEPHONE ENCOUNTER
Last OV with Office: 2/18/2022   Last visit with PCP : Visit date not found      Next visit with the Office :Visit date not found   Next visit with PCP : Visit date not found

## 2022-10-12 PROBLEM — R05.9 COUGH: Status: RESOLVED | Noted: 2022-01-20 | Resolved: 2022-10-12

## 2022-12-06 ENCOUNTER — OFFICE VISIT (OUTPATIENT)
Dept: FAMILY MEDICINE CLINIC | Facility: CLINIC | Age: 52
End: 2022-12-06

## 2022-12-06 VITALS
HEIGHT: 61 IN | HEART RATE: 96 BPM | SYSTOLIC BLOOD PRESSURE: 146 MMHG | DIASTOLIC BLOOD PRESSURE: 90 MMHG | BODY MASS INDEX: 33.79 KG/M2 | TEMPERATURE: 96.7 F | WEIGHT: 179 LBS

## 2022-12-06 DIAGNOSIS — F41.9 ANXIETY: ICD-10-CM

## 2022-12-06 DIAGNOSIS — M25.50 ARTHRALGIA, UNSPECIFIED JOINT: ICD-10-CM

## 2022-12-06 DIAGNOSIS — Z12.31 ENCOUNTER FOR SCREENING MAMMOGRAM FOR MALIGNANT NEOPLASM OF BREAST: Primary | ICD-10-CM

## 2022-12-06 DIAGNOSIS — I10 PRIMARY HYPERTENSION: ICD-10-CM

## 2022-12-06 DIAGNOSIS — Z12.11 SCREENING FOR COLON CANCER: ICD-10-CM

## 2022-12-06 PROBLEM — R00.0 TACHYCARDIA: Status: RESOLVED | Noted: 2022-02-14 | Resolved: 2022-12-06

## 2022-12-06 PROBLEM — T78.2XXA ANAPHYLACTIC SYNDROME: Status: RESOLVED | Noted: 2022-02-18 | Resolved: 2022-12-06

## 2022-12-06 RX ORDER — AMLODIPINE BESYLATE 2.5 MG/1
2.5 TABLET ORAL DAILY
Qty: 30 TABLET | Refills: 2 | Status: CANCELLED | OUTPATIENT
Start: 2022-12-06 | End: 2023-03-06

## 2022-12-06 RX ORDER — NAPROXEN 500 MG/1
500 TABLET ORAL 2 TIMES DAILY WITH MEALS
Qty: 60 TABLET | Refills: 5 | Status: SHIPPED | OUTPATIENT
Start: 2022-12-06

## 2022-12-06 RX ORDER — TRAZODONE HYDROCHLORIDE 50 MG/1
50 TABLET ORAL
Qty: 30 TABLET | Refills: 5 | Status: SHIPPED | OUTPATIENT
Start: 2022-12-06

## 2022-12-06 RX ORDER — AMLODIPINE BESYLATE 5 MG/1
5 TABLET ORAL DAILY
Qty: 30 TABLET | Refills: 5 | Status: SHIPPED | OUTPATIENT
Start: 2022-12-06

## 2022-12-06 NOTE — PROGRESS NOTES
BMI Counseling: Body mass index is 33 82 kg/m²  The BMI is above normal  Nutrition recommendations include reducing portion sizes, decreasing overall calorie intake, 3-5 servings of fruits/vegetables daily, reducing fast food intake and consuming healthier snacks  Exercise recommendations include exercising 3-5 times per week

## 2022-12-06 NOTE — PATIENT INSTRUCTIONS
Increase  amlodipine  to 5, await lab  Obesity   AMBULATORY CARE:   Obesity  means your body mass index (BMI) is greater than 30  Your healthcare provider will use your height and weight to measure your BMI  The risks of obesity include  many health problems, including injuries or physical disability  Diabetes (high blood sugar level)    High blood pressure or high cholesterol    Heart disease    Stroke    Gallbladder or liver disease    Cancer of the colon, breast, prostate, liver, or kidney    Sleep apnea    Arthritis or gout    Screening  is done to check for health conditions before you have signs or symptoms  If you are 28to 79years old, your blood sugar level may be checked every 3 years for signs of prediabetes or diabetes  Your healthcare provider will check your blood pressure at each visit  High blood pressure can lead to a stroke or other problems  Your provider may check for signs of heart disease, cancer, or other health problems  Seek care immediately if:   You have a severe headache, confusion, or difficulty speaking  You have weakness on one side of your body  You have chest pain, sweating, or shortness of breath  Call your doctor if:   You have symptoms of gallbladder or liver disease, such as pain in your upper abdomen  You have knee or hip pain and discomfort while walking  You have symptoms of diabetes, such as intense hunger and thirst, and frequent urination  You have symptoms of sleep apnea, such as snoring or daytime sleepiness  You have questions or concerns about your condition or care  Treatment for obesity  focuses on helping you lose weight to improve your health  Even a small decrease in BMI can reduce the risk for many health problems  Your healthcare provider will help you set a weight-loss goal   Lifestyle changes  are the first step in treating obesity  These include making healthy food choices and getting regular physical activity   Your healthcare provider may suggest a weight-loss program that involves coaching, education, and therapy  Medicine  may help you lose weight when it is used with a healthy foods and physical activity  Surgery  can help you lose weight if you are very obese and have other health problems  There are several types of weight-loss surgery  Ask your healthcare provider for more information  Tips for safe weight loss:   Set small, realistic goals  An example of a small goal is to walk for 20 minutes 5 days a week  Anther goal is to lose 5% of your body weight  Tell friends, family members, and coworkers about your goals  and ask for their support  Ask a friend to lose weight with you, or join a weight-loss support group  Identify foods or triggers that may cause you to overeat , and find ways to avoid them  Remove tempting high-calorie foods from your home and workplace  Place a bowl of fresh fruit on your kitchen counter  If stress causes you to eat, then find other ways to cope with stress  A counselor or therapist may be able to help you  Keep a diary to track what you eat and drink  Also write down how many minutes of physical activity you do each day  Weigh yourself once a week and record it in your diary  Eating changes: You will need to eat 500 to 1,000 fewer calories each day than you currently eat to lose 1 to 2 pounds a week  The following changes will help you cut calories:  Eat smaller portions  Use small plates, no larger than 9 inches in diameter  Fill your plate half full of fruits and vegetables  Measure your food using measuring cups until you know what a serving size looks like  Eat 3 meals and 1 or 2 snacks each day  Plan your meals in advance  Vince Rajput and eat at home most of the time  Eat slowly  Do not skip meals  Skipping meals can lead to overeating later in the day  This can make it harder for you to lose weight   Talk with a dietitian to help you make a meal plan and schedule that is right for you  Eat fruits and vegetables at every meal   They are low in calories and high in fiber, which makes you feel full  Do not add butter, margarine, or cream sauce to vegetables  Use herbs to season steamed vegetables  Eat less fat and fewer fried foods  Eat more baked or grilled chicken and fish  These protein sources are lower in calories and fat than red meat  Limit fast food  Dress your salads with olive oil and vinegar instead of bottled dressing  Limit the amount of sugar you eat  Do not drink sugary beverages  Limit alcohol  Activity changes:  Physical activity is good for your body in many ways  It helps you burn calories and build strong muscles  It decreases stress and depression, and improves your mood  It can also help you sleep better  Talk to your healthcare provider before you begin an exercise program   Exercise for at least 30 minutes 5 days a week  Start slowly  Set aside time each day for physical activity that you enjoy and that is convenient for you  It is best to do both weight training and an activity that increases your heart rate, such as walking, bicycling, or swimming  Find ways to be more active  Do yard work and housecleaning  Walk up the stairs instead of using elevators  Spend your leisure time going to events that require walking, such as outdoor festivals or fairs  This extra physical activity can help you lose weight and keep it off  Follow up with your doctor as directed: You may need to meet with a dietitian  Write down your questions so you remember to ask them during your visits  © [employer] 2022 Information is for End User's use only and may not be sold, redistributed or otherwise used for commercial purposes  All illustrations and images included in CareNotes® are the copyrighted property of [employer] A [employer] Inc  or Sharmaine Moya  The above information is an  only   It is not intended as medical advice for individual conditions or treatments  Talk to your doctor, nurse or pharmacist before following any medical regimen to see if it is safe and effective for you  Weight Management   AMBULATORY CARE:   Why it is important to manage your weight:  Being overweight increases your risk of health conditions such as heart disease, high blood pressure, type 2 diabetes, and certain types of cancer  It can also increase your risk for osteoarthritis, sleep apnea, and other respiratory problems  Aim for a slow, steady weight loss  Even a small amount of weight loss can lower your risk of health problems  Risks of being overweight:  Extra weight can cause many health problems, including the following:  Diabetes (high blood sugar level)    High blood pressure or high cholesterol    Heart disease    Stroke    Gallbladder or liver disease    Cancer of the colon, breast, prostate, liver, or kidney    Sleep apnea    Arthritis or gout    Screening  is done to check for health conditions before you have signs or symptoms  If you are 28to 79years old, your blood sugar level may be checked every 3 years for signs of prediabetes or diabetes  Your healthcare provider will check your blood pressure at each visit  High blood pressure can lead to a stroke or other problems  Your provider may check for signs of heart disease, cancer, or other health problems  How to lose weight safely:  A safe and healthy way to lose weight is to eat fewer calories and get regular exercise  You can lose up about 1 pound a week by decreasing the number of calories you eat by 500 calories each day  You can decrease calories by eating smaller portion sizes or by cutting out high-calorie foods  Read labels to find out how many calories are in the foods you eat  You can also burn calories with exercise such as walking, swimming, or biking  You will be more likely to keep weight off if you make these changes part of your lifestyle   Exercise at least 30 minutes per day on most days of the week  You can also fit in more physical activity by taking the stairs instead of the elevator or parking farther away from stores  Ask your healthcare provider about the best exercise plan for you  Healthy meal plan for weight management:  A healthy meal plan includes a variety of foods, contains fewer calories, and helps you stay healthy  A healthy meal plan includes the following:     Eat whole-grain foods more often  A healthy meal plan should contain fiber  Fiber is the part of grains, fruits, and vegetables that is not broken down by your body  Whole-grain foods are healthy and provide extra fiber in your diet  Some examples of whole-grain foods are whole-wheat breads and pastas, oatmeal, brown rice, and bulgur  Eat a variety of vegetables every day  Include dark, leafy greens such as spinach, kale, isamar greens, and mustard greens  Eat yellow and orange vegetables such as carrots, sweet potatoes, and winter squash  Eat a variety of fruits every day  Choose fresh or canned fruit (canned in its own juice or light syrup) instead of juice  Fruit juice has very little or no fiber  Eat low-fat dairy foods  Drink fat-free (skim) milk or 1% milk  Eat fat-free yogurt and low-fat cottage cheese  Try low-fat cheeses such as mozzarella and other reduced-fat cheeses  Choose meat and other protein foods that are low in fat  Choose beans or other legumes such as split peas or lentils  Choose fish, skinless poultry (chicken or turkey), or lean cuts of red meat (beef or pork)  Before you cook meat or poultry, cut off any visible fat  Use less fat and oil  Try baking foods instead of frying them  Add less fat, such as margarine, sour cream, regular salad dressing and mayonnaise to foods  Eat fewer high-fat foods  Some examples of high-fat foods include french fries, doughnuts, ice cream, and cakes  Eat fewer sweets    Limit foods and drinks that are high in sugar  This includes candy, cookies, regular soda, and sweetened drinks  Ways to decrease calories:   Eat smaller portions  Use a small plate with smaller servings  Do not eat second helpings  When you eat at a restaurant, ask for a box and place half of your meal in the box before you eat  Share an entrée with someone else  Replace high-calorie snacks with healthy, low-calorie snacks  Choose fresh fruit, vegetables, fat-free rice cakes, or air-popped popcorn instead of potato chips, nuts, or chocolate  Choose water or calorie-free drinks instead of soda or sweetened drinks  Do not shop for groceries when you are hungry  You may be more likely to make unhealthy food choices  Take a grocery list of healthy foods and shop after you have eaten  Eat regular meals  Do not skip meals  Skipping meals can lead to overeating later in the day  This can make it harder for you to lose weight  Eat a healthy snack in place of a meal if you do not have time to eat a regular meal  Talk with a dietitian to help you create a meal plan and schedule that is right for you  Other things to consider as you try to lose weight:   Be aware of situations that may give you the urge to overeat, such as eating while watching television  Find ways to avoid these situations  For example, read a book, go for a walk, or do crafts  Meet with a weight loss support group or friends who are also trying to lose weight  This may help you stay motivated to continue working on your weight loss goals  © Copyright [employer] 2022 Information is for End User's use only and may not be sold, redistributed or otherwise used for commercial purposes  All illustrations and images included in CareNotes® are the copyrighted property of [employer]  or Sharmaine Crawford   The above information is an  only  It is not intended as medical advice for individual conditions or treatments   Talk to your doctor, nurse or pharmacist before following any medical regimen to see if it is safe and effective for you

## 2022-12-06 NOTE — PROGRESS NOTES
Name: Cuate Mondragon      : 1970      MRN: [id number]  Encounter Provider: Prince Miguel MD  Encounter Date: 2022   Encounter department: [facility]    Assessment & Plan     1  Encounter for screening mammogram for malignant neoplasm of breast  -     Mammo screening bilateral w 3d & cad; Future; Expected date: 2022    2  Primary hypertension  Assessment & Plan:  BP up  Atad, increase  Amlodipine to 5  Mg day    Orders:  -     amLODIPine (NORVASC) 5 mg tablet; Take 1 tablet (5 mg total) by mouth daily  -     Comprehensive metabolic panel; Future  -     Lipid panel; Future  -     TSH, 3rd generation; Future  -     CBC and differential; Future  -     UA (URINE) with reflex to Scope    3  Arthralgia, unspecified joint  -     naproxen (Naprosyn) 500 mg tablet; Take 1 tablet (500 mg total) by mouth 2 (two) times a day with meals    4  Screening for colon cancer  -     Cologuard    5  BMI 31 0-31 9,adult    6  Anxiety  -     traZODone (DESYREL) 50 mg tablet; Take 1 tablet (50 mg total) by mouth daily at bedtime           Subjective      Having some holiday stress, not  Sleeping well, ran out of BP meds  l knee still bothering her    Review of Systems   Constitutional: Negative for activity change and fatigue  Respiratory: Negative for shortness of breath  Cardiovascular: Negative for chest pain  Neurological: Negative for dizziness and headaches  Psychiatric/Behavioral: Positive for dysphoric mood  Negative for decreased concentration  The patient is nervous/anxious          Current Outpatient Medications on File Prior to Visit   Medication Sig   • EPINEPHrine (EPIPEN) 0 3 mg/0 3 mL SOAJ Inject 0 3 mL (0 3 mg total) into a muscle once for 1 dose   • triamcinolone (KENALOG) 0 025 % cream Apply topically 2 (two) times a day for 7 days   • [DISCONTINUED] ALPRAZolam (XANAX) 0 25 mg tablet Take 1 tablet (0 25 mg total) by mouth daily at bedtime as needed for anxiety   • [DISCONTINUED] amLODIPine (NORVASC) 2 5 mg tablet Take 1 tablet (2 5 mg total) by mouth daily   • [DISCONTINUED] naproxen (Naprosyn) 500 mg tablet Take 1 tablet (500 mg total) by mouth 2 (two) times a day with meals   • hydrOXYzine HCL (ATARAX) 25 mg tablet Take 1 tablet (25 mg total) by mouth every 6 (six) hours as needed for itching for up to 3 days DO NOT TAKE WITH BENADRYL   • [DISCONTINUED] famotidine (PEPCID) 20 mg tablet Take 1 tablet (20 mg total) by mouth daily   • [DISCONTINUED] Naltrexone-buPROPion HCl ER (Contrave) 8-90 MG TB12 Take 1 tablet by mouth in the morning       Objective     /90 (BP Location: Right arm, Patient Position: Sitting, Cuff Size: Large)   Pulse 96   Temp (!) 96 7 °F (35 9 °C) (Temporal)   Ht 5' 1" (1 549 m) Comment: on file  Wt 81 2 kg (179 lb)   LMP 03/31/2018   BMI 33 82 kg/m²     Physical Exam  Vitals reviewed  Constitutional:       Appearance: Normal appearance  She is obese  Neck:      Vascular: No carotid bruit  Cardiovascular:      Rate and Rhythm: Normal rate and regular rhythm  Pulses: Normal pulses  Heart sounds: Normal heart sounds  Pulmonary:      Effort: Pulmonary effort is normal       Breath sounds: Normal breath sounds  Musculoskeletal:      Right lower leg: No edema  Left lower leg: No edema  Lymphadenopathy:      Cervical: No cervical adenopathy  Neurological:      Mental Status: She is alert  Psychiatric:         Mood and Affect: Mood is anxious  Affect is tearful         Prince Miguel MD

## 2023-01-03 ENCOUNTER — OFFICE VISIT (OUTPATIENT)
Dept: FAMILY MEDICINE CLINIC | Facility: CLINIC | Age: 53
End: 2023-01-03

## 2023-01-03 VITALS
WEIGHT: 174 LBS | OXYGEN SATURATION: 97 % | HEART RATE: 90 BPM | RESPIRATION RATE: 18 BRPM | HEIGHT: 61 IN | TEMPERATURE: 98.5 F | BODY MASS INDEX: 32.85 KG/M2 | SYSTOLIC BLOOD PRESSURE: 138 MMHG | DIASTOLIC BLOOD PRESSURE: 88 MMHG

## 2023-01-03 DIAGNOSIS — I10 PRIMARY HYPERTENSION: ICD-10-CM

## 2023-01-03 DIAGNOSIS — B00.9 HSV-1 INFECTION: ICD-10-CM

## 2023-01-03 DIAGNOSIS — Z20.822 SUSPECTED COVID-19 VIRUS INFECTION: ICD-10-CM

## 2023-01-03 DIAGNOSIS — R09.89 SUSPECTED NOVEL INFLUENZA A VIRUS INFECTION: Primary | ICD-10-CM

## 2023-01-03 LAB
SARS-COV-2 AG UPPER RESP QL IA: NEGATIVE
VALID CONTROL: NORMAL

## 2023-01-03 RX ORDER — VALACYCLOVIR HYDROCHLORIDE 1 G/1
2000 TABLET, FILM COATED ORAL EVERY 12 HOURS
Qty: 4 TABLET | Refills: 0 | Status: SHIPPED | OUTPATIENT
Start: 2023-01-03 | End: 2023-01-05

## 2023-01-03 NOTE — PROGRESS NOTES
Name: Veto Mallory      : 1970      MRN: [id number]  Encounter Provider: STERLING Dobson  Encounter Date: 1/3/2023   Encounter department: [place] [address]     1  Suspected novel influenza A virus infection  Assessment & Plan:  COVID/influenza PCR test was performed in the office today  If test is negative antibiotics will be considered  Patient reports that she will continue Benadryl as needed to help with congestion  Orders:  -     Covid/Flu- Office Collect    2  Primary hypertension  Assessment & Plan:  Well-controlled on current regimen  3  Suspected COVID-19 virus infection  -     POCT Rapid Covid Ag    4  HSV-1 infection  Assessment & Plan:  Valtrex was ordered to be taken as directed to treat acute cold sore  Orders:  -     valACYclovir (VALTREX) 1,000 mg tablet; Take 2 tablets (2,000 mg total) by mouth every 12 (twelve) hours for 1 day      BMI Counseling: Body mass index is 32 88 kg/m²  The BMI is above normal  Nutrition recommendations include decreasing portion sizes, encouraging healthy choices of fruits and vegetables, moderation in carbohydrate intake and increasing intake of lean protein  Exercise recommendations include exercising 3-5 times per week and obtaining a gym membership  No pharmacotherapy was ordered  Rationale for BMI follow-up plan is due to patient being overweight or obese  Depression Screening and Follow-up Plan: Patient was screened for depression during today's encounter  They screened negative with a PHQ-2 score of 0  Subjective      URI symptoms: Patient reports over the past 5 days she has been having symptoms of increased fatigue, sore throat, rhinorrhea, nasal congestion, PND, and non-productive cough  The patient reports that she feels as though she may have had a fever however, she never checked her temperature at home    Patient is afebrile in the office today and she reports that she has not taken any NSAIDs or Tylenol so far today  Patient denies any shortness of breath  Patient is not vaccinated for COVID and has not completed a home COVID test since her symptoms started  Rapid COVID test performed in the office today was negative  Hypertension: Well-controlled on current dosage of amlodipine  Patient denies lightheadedness, dizziness, or orthostasis  Cold sore: Patient reports over the past few days she has noted a cold sore on her right upper lip  Patient does have a history of this in the past but she reports this is the first cold sore she has had in some time  Review of Systems   Constitutional: Positive for fatigue  Negative for chills and fever  HENT: Positive for congestion, postnasal drip, rhinorrhea and sore throat  Negative for ear pain, sinus pressure and sinus pain  Eyes: Negative for pain and visual disturbance  Respiratory: Positive for cough (non-productive )  Negative for chest tightness, shortness of breath and wheezing  Cardiovascular: Negative for chest pain, palpitations and leg swelling  Gastrointestinal: Negative for abdominal pain, constipation, diarrhea, nausea and vomiting  Endocrine: Negative for cold intolerance and heat intolerance  Genitourinary: Negative for decreased urine volume, dysuria and hematuria  Musculoskeletal: Negative for arthralgias, back pain and myalgias  Skin: Negative for color change and rash  Allergic/Immunologic: Positive for environmental allergies  Neurological: Negative for dizziness, seizures, syncope, weakness, light-headedness, numbness and headaches  Hematological: Negative for adenopathy  Psychiatric/Behavioral: Negative for confusion  The patient is not nervous/anxious  All other systems reviewed and are negative        Current Outpatient Medications on File Prior to Visit   Medication Sig   • amLODIPine (NORVASC) 5 mg tablet Take 1 tablet (5 mg total) by mouth daily   • naproxen (Naprosyn) 500 mg tablet Take 1 tablet (500 mg total) by mouth 2 (two) times a day with meals   • traZODone (DESYREL) 50 mg tablet Take 1 tablet (50 mg total) by mouth daily at bedtime   • EPINEPHrine (EPIPEN) 0 3 mg/0 3 mL SOAJ Inject 0 3 mL (0 3 mg total) into a muscle once for 1 dose   • hydrOXYzine HCL (ATARAX) 25 mg tablet Take 1 tablet (25 mg total) by mouth every 6 (six) hours as needed for itching for up to 3 days DO NOT TAKE WITH BENADRYL   • triamcinolone (KENALOG) 0 025 % cream Apply topically 2 (two) times a day for 7 days       Objective     /88 (BP Location: Right arm, Patient Position: Sitting, Cuff Size: Standard)   Pulse 90   Temp 98 5 °F (36 9 °C) (Tympanic)   Resp 18   Ht 5' 1" (1 549 m)   Wt 78 9 kg (174 lb)   LMP 03/31/2018   SpO2 97%   BMI 32 88 kg/m²     Physical Exam  Vitals and nursing note reviewed  Constitutional:       General: She is not in acute distress  Appearance: Normal appearance  She is not ill-appearing  HENT:      Head: Normocephalic  Right Ear: Hearing normal  A middle ear effusion (small) is present  Tympanic membrane is injected (slight)  Left Ear: Hearing normal  A middle ear effusion (small) is present  Tympanic membrane is injected (slight)  Mouth/Throat:      Pharynx: No pharyngeal swelling, oropharyngeal exudate, posterior oropharyngeal erythema or uvula swelling  Tonsils: No tonsillar exudate or tonsillar abscesses  Eyes:      Conjunctiva/sclera: Conjunctivae normal    Cardiovascular:      Rate and Rhythm: Normal rate and regular rhythm  Pulses: Normal pulses  Carotid pulses are 2+ on the right side and 2+ on the left side  Radial pulses are 2+ on the right side and 2+ on the left side  Posterior tibial pulses are 2+ on the right side and 2+ on the left side  Heart sounds: Normal heart sounds  No murmur heard  Pulmonary:      Effort: Pulmonary effort is normal  No respiratory distress        Breath sounds: Normal breath sounds  No decreased breath sounds, wheezing, rhonchi or rales  Abdominal:      General: Abdomen is flat  Bowel sounds are normal  There is no distension  Palpations: Abdomen is soft  Tenderness: There is no abdominal tenderness  There is no guarding  Musculoskeletal:         General: Normal range of motion  Cervical back: Normal range of motion  Right lower leg: No edema  Left lower leg: No edema  Skin:     General: Skin is warm and dry  Capillary Refill: Capillary refill takes less than 2 seconds  Neurological:      General: No focal deficit present  Mental Status: She is alert and oriented to person, place, and time  Psychiatric:         Mood and Affect: Mood normal          Behavior: Behavior normal          Thought Content:  Thought content normal          Judgment: Judgment normal        STERLING Cain

## 2023-01-03 NOTE — LETTER
January 3, 2023     Patient: Socorro Valero  YOB: 1970  Date of Visit: 1/3/2023      To Whom it May Concern: Socorro Valero is under my professional care  Geradine Angelucci was seen in my office on 1/3/2023  Geradine Angelucci is excused from work from 1/2/23-1/3/23 as she is being treated for a medical condition  She may return to work on 1/4/23 with no restrictions  If you have any questions or concerns, please don't hesitate to call           Sincerely,          STERLING Aguilar        CC: No Recipients

## 2023-01-03 NOTE — ASSESSMENT & PLAN NOTE
COVID/influenza PCR test was performed in the office today  If test is negative antibiotics will be considered  Patient reports that she will continue Benadryl as needed to help with congestion

## 2023-01-04 LAB
FLUAV RNA RESP QL NAA+PROBE: NEGATIVE
FLUBV RNA RESP QL NAA+PROBE: NEGATIVE
SARS-COV-2 RNA RESP QL NAA+PROBE: NEGATIVE

## 2023-01-05 ENCOUNTER — TELEMEDICINE (OUTPATIENT)
Dept: FAMILY MEDICINE CLINIC | Facility: CLINIC | Age: 53
End: 2023-01-05

## 2023-01-05 ENCOUNTER — TELEPHONE (OUTPATIENT)
Dept: FAMILY MEDICINE CLINIC | Facility: CLINIC | Age: 53
End: 2023-01-05

## 2023-01-05 DIAGNOSIS — B00.9 HSV-1 INFECTION: ICD-10-CM

## 2023-01-05 DIAGNOSIS — J01.40 ACUTE NON-RECURRENT PANSINUSITIS: Primary | ICD-10-CM

## 2023-01-05 RX ORDER — AZITHROMYCIN 250 MG/1
TABLET, FILM COATED ORAL
Qty: 6 TABLET | Refills: 0 | Status: SHIPPED | OUTPATIENT
Start: 2023-01-05 | End: 2023-01-10

## 2023-01-05 RX ORDER — METHYLPREDNISOLONE 4 MG/1
TABLET ORAL
Qty: 21 EACH | Refills: 0 | Status: SHIPPED | OUTPATIENT
Start: 2023-01-05

## 2023-01-05 RX ORDER — DEXTROMETHORPHAN HYDROBROMIDE AND PROMETHAZINE HYDROCHLORIDE 15; 6.25 MG/5ML; MG/5ML
5 SOLUTION ORAL 4 TIMES DAILY PRN
Qty: 118 ML | Refills: 0 | Status: SHIPPED | OUTPATIENT
Start: 2023-01-05

## 2023-01-05 NOTE — PATIENT INSTRUCTIONS
Problem List Items Addressed This Visit          Respiratory    Acute non-recurrent pansinusitis - Primary     Azithromycin and Medrol Dosepak were ordered to treat sinusitis  Phenergan DM was ordered to be used as needed for cough  Relevant Medications    azithromycin (Zithromax) 250 mg tablet    methylPREDNISolone 4 MG tablet therapy pack    Promethazine-DM (PHENERGAN-DM) 6 25-15 mg/5 mL oral syrup       Other    RESOLVED: HSV-1 infection     This appears to be resolving at this point

## 2023-01-05 NOTE — PROGRESS NOTES
COVID-19 Outpatient Progress Note    Assessment/Plan:    Problem List Items Addressed This Visit    None     Disposition:         *** DOCUMENT TIME SPENT ***      Encounter provider: STERLING Sparrow     Provider located at: [address] [facility] [address] [place] [id number] [phone]     Recent Visits  Date Type Provider Dept   01/03/23 Office Visit [facility]   Showing recent visits within past 7 days and meeting all other requirements  Today's Visits  Date Type Provider Dept   01/05/23 Telemedicine Noel [facility]   Showing today's visits and meeting all other requirements  Future Appointments  No visits were found meeting these conditions  Showing future appointments within next 150 days and meeting all other requirements     This virtual check-in was done via [employer] [address] and patient was informed that this is a secure, HIPAA-compliant platform  She agrees to proceed  Patient agrees to participate in a virtual check in via telephone or video visit instead of presenting to the office to address urgent/immediate medical needs  Patient is aware this is a billable service  She acknowledged consent and understanding of privacy and security of the video platform  The patient has agreed to participate and understands they can discontinue the visit at any time  After connecting through [facility], the patient was identified by name and date of birth  Nahomi Dey was informed that this was a telemedicine visit and that the exam was being conducted confidentially over secure lines  My office door was closed  No one else was in the room  Nahomi Dey acknowledged consent and understanding of privacy and security of the telemedicine visit  I informed the patient that I have reviewed her record in Epic and presented the opportunity for her to ask any questions regarding the visit today   The patient agreed to participate  Verification of patient location:  Patient is located in the following state in which I hold an active license: [place]    Subjective: Pablo Altamirano is a 47 y o  female who has been screened for COVID-19  Patient's symptoms include chills, fatigue, malaise, nasal congestion, rhinorrhea, sore throat, cough, myalgias and headache  Patient denies fever, anosmia, loss of taste, shortness of breath, chest tightness, abdominal pain, nausea, vomiting and diarrhea  *** VACCINATION STATUS REQUIRED ***    Patient was originally seen in the office on 1/3/2023 for URI symptoms that she had been having for about 5 days at that point  Patient had a rapid COVID test and COVID/influenza PCR tests performed at that time which were both negative  Patient is reporting symptoms of sinus pressure and congestion, PND, chills, productive cough, rhinorrhea, nasal congestion, sore throat, headaches, myalgias, and increased fatigue  Patient denies any fever, shortness of breath, diarrhea, nausea, vomiting, abdominal pain, or loss of smell or taste  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 cold sore at her office visit on 1/3/2023 so she was started on Valtrex at that time  Patient reports that since taking the medication the cold sore has improved greatly and seems to be resolving at this point  Lab Results   Component Value Date    SARSCOV2 Negative 01/03/2023    SARSCOV2 Detected (A) 12/10/2020    SARSCOVAG Negative 01/03/2023       Review of Systems   Constitutional: Positive for chills and fatigue  Negative for fever  HENT: Positive for congestion, rhinorrhea and sore throat  Respiratory: Positive for cough  Negative for chest tightness and shortness of breath  Gastrointestinal: Negative for abdominal pain, diarrhea, nausea and vomiting  Musculoskeletal: Positive for myalgias  Neurological: Positive for headaches       Current Outpatient Medications on File Prior to Visit Medication Sig   • amLODIPine (NORVASC) 5 mg tablet Take 1 tablet (5 mg total) by mouth daily   • EPINEPHrine (EPIPEN) 0 3 mg/0 3 mL SOAJ Inject 0 3 mL (0 3 mg total) into a muscle once for 1 dose   • hydrOXYzine HCL (ATARAX) 25 mg tablet Take 1 tablet (25 mg total) by mouth every 6 (six) hours as needed for itching for up to 3 days DO NOT TAKE WITH BENADRYL   • naproxen (Naprosyn) 500 mg tablet Take 1 tablet (500 mg total) by mouth 2 (two) times a day with meals   • traZODone (DESYREL) 50 mg tablet Take 1 tablet (50 mg total) by mouth daily at bedtime   • triamcinolone (KENALOG) 0 025 % cream Apply topically 2 (two) times a day for 7 days   • [DISCONTINUED] valACYclovir (VALTREX) 1,000 mg tablet Take 2 tablets (2,000 mg total) by mouth every 12 (twelve) hours for 1 day       Objective:    LMP 03/31/2018      Physical Exam  STERLING Cain

## 2023-01-05 NOTE — PROGRESS NOTES
Virtual Regular Visit    Verification of patient location:    Patient is located in the following state in which I hold an active license [place]      Assessment/Plan:    Problem List Items Addressed This Visit        Respiratory    Acute non-recurrent pansinusitis - Primary     Azithromycin and Medrol Dosepak were ordered to treat sinusitis  Phenergan DM was ordered to be used as needed for cough  Relevant Medications    azithromycin (Zithromax) 250 mg tablet    methylPREDNISolone 4 MG tablet therapy pack    Promethazine-DM (PHENERGAN-DM) 6 25-15 mg/5 mL oral syrup       Other    RESOLVED: HSV-1 infection     This appears to be resolving at this point  Reason for visit is   Chief Complaint   Patient presents with   • Virtual Brief Visit     Cough, headache, sore throat  and congestion and she is not feeling but worse  Rcruz   • COVID-19   • Virtual Regular Visit        Encounter provider STERLING Myers    Provider located at [address] [address] [phone]      Recent Visits  Date Type Provider Dept   01/03/23 Office Visit [facility]   Showing recent visits within past 7 days and meeting all other requirements  Today's Visits  Date Type Provider Dept   01/05/23 Telemedicine [facility]   Showing today's visits and meeting all other requirements  Future Appointments  No visits were found meeting these conditions  Showing future appointments within next 150 days and meeting all other requirements       The patient was identified by name and date of birth  Theresa Nunes was informed that this is a telemedicine visit and that the visit is being conducted through the [employer]  She agrees to proceed     My office door was closed  No one else was in the room    She acknowledged consent and understanding of privacy and security of the video platform  The patient has agreed to participate and understands they can discontinue the visit at any time  Patient is aware this is a billable service  Subjective  Maria Ines Swanson is a 47 y o  female    Patient was originally seen in the office on 1/3/2023 for URI symptoms that she had been having for about 5 days at that point  Patient had a rapid COVID test and COVID/influenza PCR tests performed at that time which were both negative  Patient is reporting symptoms of sinus pressure and congestion, PND, chills, productive cough, rhinorrhea, nasal congestion, sore throat, headaches, myalgias, and increased fatigue  Patient denies any fever, shortness of breath, diarrhea, nausea, vomiting, abdominal pain, or loss of smell or taste  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 cold sore at her office visit on 1/3/2023 so she was started on Valtrex at that time  Patient reports that since taking the medication the cold sore has improved greatly and seems to be resolving at this point  Past Medical History:   Diagnosis Date   • Anemia     resolved 12/08/16   • Anxiety    • Asthma    • Depression    • HSV-1 infection 1/3/2023   • Hypertension    • Mental disorder    • Trichomonal vulvovaginitis     last assessed 02/14/13       Past Surgical History:   Procedure Laterality Date   • APPENDECTOMY     • MD LAPS TOTAL HYSTERECT 250 GM/< W/RMVL TUBE/OVARY N/A 5/31/2018    Procedure: ROBOTIC TOTAL LAPAROSCOPIC HYSTERECTOMY,    BILATERAL SALPINGECTOMY AND INTRA-OP CYSTO;  Surgeon: Tomas Geronimo MD;  Location: [facility];  Service: Gynecology Oncology   • TUBAL LIGATION         Current Outpatient Medications   Medication Sig Dispense Refill   • amLODIPine (NORVASC) 5 mg tablet Take 1 tablet (5 mg total) by mouth daily 30 tablet 5   • azithromycin (Zithromax) 250 mg tablet Take 2 tablets (500 mg total) by mouth daily for 1 day, THEN 1 tablet (250 mg total) daily for 4 days   6 tablet 0   • EPINEPHrine (EPIPEN) 0 3 mg/0 3 mL SOAJ Inject 0 3 mL (0 3 mg total) into a muscle once for 1 dose 0 6 mL 0   • hydrOXYzine HCL (ATARAX) 25 mg tablet Take 1 tablet (25 mg total) by mouth every 6 (six) hours as needed for itching for up to 3 days DO NOT TAKE WITH BENADRYL 12 tablet 0   • methylPREDNISolone 4 MG tablet therapy pack Use as directed on package 21 each 0   • naproxen (Naprosyn) 500 mg tablet Take 1 tablet (500 mg total) by mouth 2 (two) times a day with meals 60 tablet 5   • Promethazine-DM (PHENERGAN-DM) 6 25-15 mg/5 mL oral syrup Take 5 mL by mouth 4 (four) times a day as needed for cough 118 mL 0   • traZODone (DESYREL) 50 mg tablet Take 1 tablet (50 mg total) by mouth daily at bedtime 30 tablet 5   • triamcinolone (KENALOG) 0 025 % cream Apply topically 2 (two) times a day for 7 days 15 g 0     No current facility-administered medications for this visit  Facility-Administered Medications Ordered in Other Visits   Medication Dose Route Frequency Provider Last Rate Last Admin   • albuterol (PROVENTIL HFA,VENTOLIN HFA) inhaler 2 puff  2 puff Inhalation Once Rahul Fountain Hay, DO            Allergies   Allergen Reactions   • Barley Grass - Food Allergy Hives       Review of Systems   Constitutional: Positive for chills and fatigue  Negative for fever  HENT: Positive for congestion, postnasal drip, rhinorrhea, sinus pressure, sinus pain and sore throat  Negative for ear pain  Eyes: Negative for pain and visual disturbance  Respiratory: Positive for cough (productive )  Negative for chest tightness, shortness of breath and wheezing  Cardiovascular: Negative for chest pain, palpitations and leg swelling  Gastrointestinal: Negative for abdominal pain, constipation, diarrhea, nausea and vomiting  Endocrine: Negative for cold intolerance and heat intolerance  Genitourinary: Negative for decreased urine volume, dysuria and hematuria  Musculoskeletal: Positive for myalgias   Negative for arthralgias and back pain  Skin: Negative for color change and rash  Allergic/Immunologic: Negative for environmental allergies  Neurological: Positive for headaches  Negative for dizziness, seizures, syncope, weakness, light-headedness and numbness  Hematological: Negative for adenopathy  Psychiatric/Behavioral: Negative for confusion  The patient is not nervous/anxious  All other systems reviewed and are negative  Video Exam    There were no vitals filed for this visit  Physical Exam  Vitals reviewed: limited due to video exam    Constitutional:       General: She is not in acute distress  Appearance: Normal appearance  She is not ill-appearing  Neurological:      Mental Status: She is alert            I spent 15 minutes directly with the patient during this visit

## 2023-01-05 NOTE — TELEPHONE ENCOUNTER
Patient called into the office requesting a work note stating that she can return Monday back to work  Patient stated she was to return today but she wasn't feeling well  Please call patient back once done  Thank You

## 2023-01-05 NOTE — ASSESSMENT & PLAN NOTE
Azithromycin and Medrol Dosepak were ordered to treat sinusitis  Phenergan DM was ordered to be used as needed for cough

## 2023-03-06 PROBLEM — J01.40 ACUTE NON-RECURRENT PANSINUSITIS: Status: RESOLVED | Noted: 2022-01-20 | Resolved: 2023-03-06

## 2023-04-15 ENCOUNTER — HOSPITAL ENCOUNTER (EMERGENCY)
Facility: HOSPITAL | Age: 53
Discharge: HOME | End: 2023-04-15
Attending: EMERGENCY MEDICINE

## 2023-04-15 VITALS
SYSTOLIC BLOOD PRESSURE: 120 MMHG | DIASTOLIC BLOOD PRESSURE: 74 MMHG | TEMPERATURE: 100 F | WEIGHT: 175.27 LBS | RESPIRATION RATE: 22 BRPM | OXYGEN SATURATION: 99 % | BODY MASS INDEX: 33.12 KG/M2 | HEART RATE: 98 BPM

## 2023-04-15 DIAGNOSIS — F41.8 ANXIETY ABOUT HEALTH: Primary | ICD-10-CM

## 2023-04-15 NOTE — ED NOTES
Patient did state that she was drinking tonight, this week is the anniversary of her sons death 27 years ago       Marybeth Cox, RN  04/15/23 [id number]

## 2023-04-15 NOTE — ED PROVIDER NOTES
History  Chief Complaint   Patient presents with   • Anxiety     Patient was drinking tonight and did not take a dose of her Amlodipine and Trazadone, was arrested for public drunken, is brought here with EMS r/t feeling anxious and like she is having palpitations  HPI    48 yo F presents to ed from half-way cell for a dose of her amlodipine  Patient states she was arrested for public intoxication  She was in the half-way cell for a few hours when she was panicked about not getting her night time dose of amlodipine  Patient released to the ER  No si hi hallucinations  Oriented to person place time  Is clinically sober, no difficulty with ambulation  She offers no medical complaints at this time  Prior to Admission Medications   Prescriptions Last Dose Informant Patient Reported? Taking?    EPINEPHrine (EPIPEN) 0 3 mg/0 3 mL SOAJ   No No   Sig: Inject 0 3 mL (0 3 mg total) into a muscle once for 1 dose   Promethazine-DM (PHENERGAN-DM) 6 25-15 mg/5 mL oral syrup   No No   Sig: Take 5 mL by mouth 4 (four) times a day as needed for cough   amLODIPine (NORVASC) 5 mg tablet   No No   Sig: Take 1 tablet (5 mg total) by mouth daily   hydrOXYzine HCL (ATARAX) 25 mg tablet   No No   Sig: Take 1 tablet (25 mg total) by mouth every 6 (six) hours as needed for itching for up to 3 days DO NOT TAKE WITH BENADRYL   methylPREDNISolone 4 MG tablet therapy pack   No No   Sig: Use as directed on package   naproxen (Naprosyn) 500 mg tablet   No No   Sig: Take 1 tablet (500 mg total) by mouth 2 (two) times a day with meals   traZODone (DESYREL) 50 mg tablet   No No   Sig: Take 1 tablet (50 mg total) by mouth daily at bedtime   triamcinolone (KENALOG) 0 025 % cream   No No   Sig: Apply topically 2 (two) times a day for 7 days      Facility-Administered Medications: None       Past Medical History:   Diagnosis Date   • Anemia     resolved 12/08/16   • Anxiety    • Asthma    • Depression    • HSV-1 infection 1/3/2023   • Hypertension    • Mental disorder    • Trichomonal vulvovaginitis     last assessed 02/14/13       Past Surgical History:   Procedure Laterality Date   • APPENDECTOMY     • [place] LAPS TOTAL HYSTERECT 250 GM/< W/RMVL TUBE/OVARY N/A 5/31/2018    Procedure: ROBOTIC TOTAL LAPAROSCOPIC HYSTERECTOMY,    BILATERAL SALPINGECTOMY AND INTRA-OP CYSTO;  Surgeon: Ritu Garza MD;  Location: [facility];  Service: Gynecology Oncology   • TUBAL LIGATION         Family History   Problem Relation Age of Onset   • Hypertension Mother    • Cancer Mother    • No Known Problems Father    • No Known Problems Sister    • No Known Problems Maternal Grandmother    • No Known Problems Maternal Grandfather    • No Known Problems Paternal Grandmother    • No Known Problems Paternal Grandfather      I have reviewed and agree with the history as documented  E-Cigarette/Vaping   • E-Cigarette Use Never User      E-Cigarette/Vaping Substances     Social History     Tobacco Use   • Smoking status: Never   • Smokeless tobacco: Never   Vaping Use   • Vaping Use: Never used   Substance Use Topics   • Alcohol use: Yes     Alcohol/week: 4 0 standard drinks     Types: 4 Glasses of wine per week   • Drug use: No       Review of Systems   Constitutional: Negative for chills, fatigue and fever  HENT: Negative for sore throat  Eyes: Negative for redness and visual disturbance  Respiratory: Negative for cough and shortness of breath  Cardiovascular: Negative for chest pain  Gastrointestinal: Negative for abdominal pain, diarrhea and nausea  Genitourinary: Negative for difficulty urinating, dysuria and pelvic pain  Musculoskeletal: Negative for back pain  Skin: Negative for rash  Neurological: Negative for syncope, weakness and headaches  All other systems reviewed and are negative  Physical Exam  Physical Exam  Vitals and nursing note reviewed  Constitutional:       General: She is not in acute distress    HENT:      Head: Normocephalic and atraumatic  Eyes:      Conjunctiva/sclera: Conjunctivae normal    Cardiovascular:      Rate and Rhythm: Normal rate and regular rhythm  Heart sounds: Normal heart sounds  Pulmonary:      Effort: Pulmonary effort is normal  No respiratory distress  Breath sounds: Normal breath sounds  Abdominal:      General: Bowel sounds are normal       Palpations: Abdomen is soft  Tenderness: There is no abdominal tenderness  Musculoskeletal:         General: Normal range of motion  Cervical back: Normal range of motion  Skin:     General: Skin is warm and dry  Findings: No rash  Neurological:      Mental Status: She is alert and oriented to person, place, and time  Cranial Nerves: No cranial nerve deficit  Sensory: No sensory deficit  Motor: No abnormal muscle tone  Coordination: Coordination normal          Vital Signs  ED Triage Vitals [04/15/23 0203]   Temperature Pulse Respirations Blood Pressure SpO2   100 °F (37 8 °C) (!) 124 22 (!) 177/103 99 %      Temp Source Heart Rate Source Patient Position - Orthostatic VS BP Location FiO2 (%)   Tympanic Monitor Lying Left arm --      Pain Score       --           Vitals:    04/15/23 0203 04/15/23 0239 04/15/23 0240   BP: (!) 177/103  120/74   Pulse: (!) 124 98    Patient Position - Orthostatic VS: Lying           Visual Acuity      ED Medications  Medications - No data to display    Diagnostic Studies  Results Reviewed     None                 No orders to display              Procedures  Procedures         ED Course                               SBIRT 22yo+    Flowsheet Row Most Recent Value   Initial Alcohol Screen: US AUDIT-C     1  How often do you have a drink containing alcohol? 1 Filed at: 04/15/2023 0209   2  How many drinks containing alcohol do you have on a typical day you are drinking? 0 Filed at: 04/15/2023 0209   3a  Male UNDER 65: How often do you have five or more drinks on one occasion?  0 Filed at: 04/15/2023 0209   3b  FEMALE Any Age, or MALE 65+: How often do you have 4 or more drinks on one occassion? 1 Filed at: 04/15/2023 0209   Audit-C Score 2 Filed at: 04/15/2023 0209   BOLIVAR: How many times in the past year have you    Used an illegal drug or used a prescription medication for non-medical reasons? Never Filed at: 04/15/2023 0209                    [facility]     Amount and/or Complexity of Data Reviewed  Clinical lab tests: ordered and reviewed   Reviewed past medical records: yes, history of htn, managed by pcp  Independent review of labs: yes    History Provided by patient     Differential considered HTN, anxiety    Patient had resolution of her HTN while waiting in the ER  She already has enough amlodipine at her house she states  She wants to go home and take her medications  She is not in police custody at this time  The patient was instructed to follow up as documented  Strict return precautions were discussed with the patient and the patient was instructed to return to the emergency department immediately if symptoms worsen  The patient/patient family member acknowledged and were in agreement with plan  Disposition  Final diagnoses:   Anxiety about health     Time reflects when diagnosis was documented in both MDM as applicable and the Disposition within this note     Time User Action Codes Description Comment    4/15/2023  2:47 AM [address] Frye Add [F41 8] Anxiety about health       ED Disposition     ED Disposition   Discharge    Condition   Stable    Date/Time   Sat Apr 15, 2023  2:47 AM    Comment   Tiffany Escalera discharge to home/self care                 Follow-up Information     Follow up With Specialties Details Why Contact Info    Shaun Lobo MD Family Medicine Schedule an appointment as soon as possible for a visit in 3 days For follow up regarding your symptoms and recheck [address] [place] [id number] [phone]            Discharge Medication List as of 4/15/2023 2:54 AM      CONTINUE these medications which have NOT CHANGED    Details   amLODIPine (NORVASC) 5 mg tablet Take 1 tablet (5 mg total) by mouth daily, Starting Tue 12/6/2022, Normal      EPINEPHrine (EPIPEN) 0 3 mg/0 3 mL SOAJ Inject 0 3 mL (0 3 mg total) into a muscle once for 1 dose, Starting Tue 2/15/2022, Normal      hydrOXYzine HCL (ATARAX) 25 mg tablet Take 1 tablet (25 mg total) by mouth every 6 (six) hours as needed for itching for up to 3 days DO NOT TAKE WITH BENADRYL, Starting Tue 7/5/2022, Until Thu 1/5/2023 at 2359, Normal      methylPREDNISolone 4 MG tablet therapy pack Use as directed on package, Normal      naproxen (Naprosyn) 500 mg tablet Take 1 tablet (500 mg total) by mouth 2 (two) times a day with meals, Starting Tue 12/6/2022, Normal      Promethazine-DM (PHENERGAN-DM) 6 25-15 mg/5 mL oral syrup Take 5 mL by mouth 4 (four) times a day as needed for cough, Starting Thu 1/5/2023, Normal      traZODone (DESYREL) 50 mg tablet Take 1 tablet (50 mg total) by mouth daily at bedtime, Starting Tue 12/6/2022, Normal      triamcinolone (KENALOG) 0 025 % cream Apply topically 2 (two) times a day for 7 days, Starting Tue 7/5/2022, Until Thu 1/5/2023, Normal             No discharge procedures on file      PDMP Review       Value Time User    PDMP Reviewed  Yes 3/21/2022 11:58 AM Shaun Lobo MD          ED Provider  Electronically Signed by           Aide Treviño MD  04/15/23 7539

## 2023-04-24 ENCOUNTER — OFFICE VISIT (OUTPATIENT)
Dept: OBGYN CLINIC | Facility: CLINIC | Age: 53
End: 2023-04-24

## 2023-04-24 VITALS
SYSTOLIC BLOOD PRESSURE: 150 MMHG | HEIGHT: 61 IN | DIASTOLIC BLOOD PRESSURE: 100 MMHG | BODY MASS INDEX: 33.04 KG/M2 | WEIGHT: 175 LBS

## 2023-04-24 DIAGNOSIS — R35.0 URINARY FREQUENCY: ICD-10-CM

## 2023-04-24 DIAGNOSIS — L72.3 SEBACEOUS CYST: Primary | ICD-10-CM

## 2023-04-24 DIAGNOSIS — Z11.3 SCREENING EXAMINATION FOR STD (SEXUALLY TRANSMITTED DISEASE): ICD-10-CM

## 2023-04-24 LAB
SL AMB  POCT GLUCOSE, UA: NORMAL
SL AMB LEUKOCYTE ESTERASE,UA: NORMAL
SL AMB POCT BILIRUBIN,UA: NORMAL
SL AMB POCT BLOOD,UA: NORMAL
SL AMB POCT CLARITY,UA: CLEAR
SL AMB POCT COLOR,UA: YELLOW
SL AMB POCT KETONES,UA: NORMAL
SL AMB POCT NITRITE,UA: NORMAL
SL AMB POCT PH,UA: 6
SL AMB POCT SPECIFIC GRAVITY,UA: 1.02
SL AMB POCT URINE PROTEIN: NORMAL
SL AMB POCT UROBILINOGEN: 0.2

## 2023-04-24 NOTE — PROGRESS NOTES
"Assessment / Plan    1  Sebaceous cyst  Reassured  Warm compresses/topical antibiotic ointment  2  Urinary frequency  Urine dip negative  - POCT urine dip    3  Screening examination for STD (sexually transmitted disease)  G/C collected  Subjective      Virgilio Eli is a 47 y o  female     NEW PATIENT PROBLEM  CC: urinary symptoms/ lump in groin area    46 yo  PMF presents with urogenital symptoms  Patient reports increase in urinary frequency which started around 3 weeks ago  Denies urgency or pain with urination  She had unprotected sex with her ex- one month ago and is concerned she may have something because she also noticed a small bump in her right groin area  Denies pain or itching, \"small\"  She denies abn vaginal bleeding, pelvic pain or vaginal discharge  STD hx: chlamydia in her 25s  Not currently sexually active, last time about one month ago with her ex, no condom  post RA total laparoscopic hysterectomy (still has ovaries), Dr Winter العراقي; pathology benign    /100- she is taking amlodipine 5 mg  I sent communication to her PCP Dr Kirsten Gaming  Periods are absent  Current contraception: status post hysterectomy  History of abnormal Pap smear: no  Family history of breast,uterine, ovarian or colon cancer: no    Menstrual History:  OB History        2    Para   2    Term                AB        Living   2       SAB        IAB        Ectopic        Multiple        Live Births               Obstetric Comments   Menarche 16  Post hysterectomy, benign, age 53; still has ovaries  Patient's last menstrual period was 2018         The following portions of the patient's history were reviewed and updated as appropriate: allergies, current medications, past family history, past medical history, past social history, past surgical history and problem list     Review of Systems      Review of Systems   Constitutional: Negative for chills and " "fever  Respiratory: Negative for cough and shortness of breath  Genitourinary: Positive for frequency, genital sores, pelvic pain (suprapubic discomfort, intermittent) and vaginal discharge (occ white, no foul odor or itching)  Negative for difficulty urinating, dysuria, menstrual problem, urgency and vaginal bleeding  Musculoskeletal: Negative for arthralgias and myalgias  Breasts:  Negative for skin changes, dimpling, asymmetry, nipple discharge, redness, tenderness or palpable masses      Objective      /100 (BP Location: Left arm, Patient Position: Sitting, Cuff Size: Adult)   Ht 5' 1\" (1 549 m)   Wt 79 4 kg (175 lb)   LMP 03/31/2018   BMI 33 07 kg/m²   Physical Exam  Constitutional:       General: She is not in acute distress  Appearance: Normal appearance  She is well-developed  She is not ill-appearing or diaphoretic  Comments: bmi 33 1   HENT:      Head: Normocephalic and atraumatic  Eyes:      Pupils: Pupils are equal, round, and reactive to light  Pulmonary:      Effort: Pulmonary effort is normal    Abdominal:      General: Abdomen is flat  Palpations: Abdomen is soft  Genitourinary:     General: Normal vulva  Exam position: Lithotomy position  Labia:         Right: No rash, tenderness, lesion or injury  Left: No rash, tenderness, lesion or injury  Vagina: No signs of injury and foreign body  Vaginal discharge (scant) present  No erythema, tenderness or bleeding  Adnexa:         Right: No mass or tenderness  Left: No mass or tenderness  Comments: Cervix and uterus surgically absent  No bladder base tenderness  Skin:     General: Skin is warm and dry  Neurological:      General: No focal deficit present  Mental Status: She is alert and oriented to person, place, and time  Psychiatric:         Mood and Affect: Mood normal          Behavior: Behavior normal          Thought Content:  Thought content normal         " Judgment: Judgment normal

## 2023-04-25 LAB
C TRACH DNA SPEC QL NAA+PROBE: NEGATIVE
N GONORRHOEA DNA SPEC QL NAA+PROBE: NEGATIVE

## 2023-05-31 DIAGNOSIS — I10 PRIMARY HYPERTENSION: ICD-10-CM

## 2023-05-31 RX ORDER — AMLODIPINE BESYLATE 5 MG/1
5 TABLET ORAL DAILY
Qty: 30 TABLET | Refills: 5 | Status: SHIPPED | OUTPATIENT
Start: 2023-05-31

## 2023-07-05 ENCOUNTER — TELEMEDICINE (OUTPATIENT)
Dept: FAMILY MEDICINE CLINIC | Facility: CLINIC | Age: 53
End: 2023-07-05
Payer: COMMERCIAL

## 2023-07-05 VITALS — BODY MASS INDEX: 32.28 KG/M2 | HEIGHT: 61 IN | WEIGHT: 171 LBS

## 2023-07-05 DIAGNOSIS — K52.9 GASTROENTERITIS: Primary | ICD-10-CM

## 2023-07-05 PROCEDURE — 99213 OFFICE O/P EST LOW 20 MIN: CPT | Performed by: FAMILY MEDICINE

## 2023-07-05 RX ORDER — ONDANSETRON 4 MG/1
4 TABLET, ORALLY DISINTEGRATING ORAL EVERY 6 HOURS PRN
Qty: 20 TABLET | Refills: 0 | Status: SHIPPED | OUTPATIENT
Start: 2023-07-05

## 2023-07-05 NOTE — LETTER
July 5, 2023     Patient: Charlyne Dose  YOB: 1970  Date of Visit: 7/5/2023      To Whom it May Concern: Charlyne Dose is under my professional care. Hernesto Adair was seen in my office on 7/5/2023. Hernesto Adair may rtw  on  7/7/23    If you have any questions or concerns, please don't hesitate to call.          Sincerely,          Jimmy Carrizales MD        CC: No Recipients

## 2023-07-05 NOTE — PROGRESS NOTES
Virtual Regular Visit    Verification of patient location:    Patient is located at Home in the following state in which I hold an active license [place]      Assessment/Plan:    Problem List Items Addressed This Visit    None           Reason for visit is   Chief Complaint   Patient presents with   • Diarrhea     Per pt since yesterday    • Abdominal Pain   • Nausea   • Vomiting   • Virtual Regular Visit        Encounter provider Pk Bell MD    Provider located at [address] [address] [phone]      Recent Visits  No visits were found meeting these conditions. Showing recent visits within past 7 days and meeting all other requirements  Today's Visits  Date Type Provider Dept   07/05/23 Telemedicine Pk Bell MD [facility]   Showing today's visits and meeting all other requirements  Future Appointments  No visits were found meeting these conditions. Showing future appointments within next 150 days and meeting all other requirements       The patient was identified by name and date of birth. Savanna Ventura was informed that this is a telemedicine visit and that the visit is being conducted through the [employer]. She agrees to proceed. .  My office door was closed. No one else was in the room. She acknowledged consent and understanding of privacy and security of the video platform. The patient has agreed to participate and understands they can discontinue the visit at any time. Patient is aware this is a billable service. Subjective  Savanna Ventura is a 47 y.o. female       Thinks  She ate  Bad  Food at  [employer], nausea. vomiting, diarrhea, intestinal cramps, headache, fatigue, no resp sx, no blood in bms       Past Medical History:   Diagnosis Date   • Anemia     resolved 12/08/16   • Anxiety    • Asthma    • Chlamydia     in her 25s   • Depression    • Fibroid    • HSV-1 infection 01/03/2023   • Hypertension    • Mental disorder    • Trichomonal vulvovaginitis     last assessed 02/14/13       Past Surgical History:   Procedure Laterality Date   • APPENDECTOMY     • [place] LAPS TOTAL HYSTERECT 250 GM/< W/RMVL TUBE/OVARY N/A 5/31/2018    Procedure: ROBOTIC TOTAL LAPAROSCOPIC HYSTERECTOMY,    BILATERAL SALPINGECTOMY AND INTRA-OP CYSTO;  Surgeon: Jone Kolb MD;  Location: [facility];  Service: Gynecology Oncology   • TUBAL LIGATION         Current Outpatient Medications   Medication Sig Dispense Refill   • amLODIPine (NORVASC) 5 mg tablet Take 1 tablet (5 mg total) by mouth daily 30 tablet 5   • naproxen (Naprosyn) 500 mg tablet Take 1 tablet (500 mg total) by mouth 2 (two) times a day with meals 60 tablet 5   • traZODone (DESYREL) 50 mg tablet Take 1 tablet (50 mg total) by mouth daily at bedtime 30 tablet 5   • EPINEPHrine (EPIPEN) 0.3 mg/0.3 mL SOAJ Inject 0.3 mL (0.3 mg total) into a muscle once for 1 dose (Patient not taking: Reported on 4/24/2023) 0.6 mL 0     No current facility-administered medications for this visit. Facility-Administered Medications Ordered in Other Visits   Medication Dose Route Frequency Provider Last Rate Last Admin   • albuterol (PROVENTIL HFA,VENTOLIN HFA) inhaler 2 puff  2 puff Inhalation Once Jamse Carter Hay, DO            Allergies   Allergen Reactions   • Barley Grass - Food Allergy Hives       Review of Systems   Constitutional: Positive for activity change, appetite change and fatigue. Negative for fever. Decreased appetite and activity   HENT: Negative for congestion and sore throat. Respiratory: Negative for shortness of breath. Cardiovascular: Negative for chest pain. Gastrointestinal: Positive for abdominal distention, abdominal pain, diarrhea, nausea and vomiting. Neurological: Positive for headaches.        Video Exam    Vitals:    07/05/23 1039   Weight: 77.6 kg (171 lb)   Height: 5' 1" (1.549 m)       Physical Exam  Constitutional: Appearance: She is well-developed. She is obese. She is ill-appearing. HENT:      Nose: No congestion. Eyes:      General:         Right eye: No discharge. Left eye: No discharge. Pulmonary:      Effort: Pulmonary effort is normal.   Lymphadenopathy:      Cervical: No cervical adenopathy. Neurological:      Mental Status: She is alert.    Psychiatric:         Mood and Affect: Mood normal.          Visit Time  Total Visit Duration: 5 minutes

## 2023-08-03 ENCOUNTER — TELEPHONE (OUTPATIENT)
Dept: FAMILY MEDICINE CLINIC | Facility: CLINIC | Age: 53
End: 2023-08-03

## 2023-08-03 DIAGNOSIS — M25.50 ARTHRALGIA, UNSPECIFIED JOINT: ICD-10-CM

## 2023-08-03 RX ORDER — NAPROXEN 500 MG/1
500 TABLET ORAL 2 TIMES DAILY WITH MEALS
Qty: 60 TABLET | Refills: 5 | Status: SHIPPED | OUTPATIENT
Start: 2023-08-03

## 2023-08-03 NOTE — TELEPHONE ENCOUNTER
[place], this is Dimas Collier. I'm calling about my script that hasn't been filled for the last couple weeks. I needed to know if it's going to get refilled. My birth date is [birth year] and once again my name is Dimas Collier. [phone], Thank you.

## 2023-08-28 ENCOUNTER — TELEMEDICINE (OUTPATIENT)
Dept: FAMILY MEDICINE CLINIC | Facility: CLINIC | Age: 53
End: 2023-08-28
Payer: COMMERCIAL

## 2023-08-28 VITALS — HEIGHT: 61 IN | WEIGHT: 171 LBS | BODY MASS INDEX: 32.28 KG/M2

## 2023-08-28 DIAGNOSIS — R42 DIZZINESS: ICD-10-CM

## 2023-08-28 DIAGNOSIS — F41.9 ANXIETY: Primary | ICD-10-CM

## 2023-08-28 DIAGNOSIS — R00.2 PALPITATIONS: ICD-10-CM

## 2023-08-28 PROBLEM — R09.89 SUSPECTED NOVEL INFLUENZA A VIRUS INFECTION: Status: RESOLVED | Noted: 2023-01-03 | Resolved: 2023-08-28

## 2023-08-28 PROCEDURE — 99214 OFFICE O/P EST MOD 30 MIN: CPT | Performed by: FAMILY MEDICINE

## 2023-08-28 RX ORDER — ESCITALOPRAM OXALATE 10 MG/1
10 TABLET ORAL DAILY
Qty: 30 TABLET | Refills: 5 | Status: SHIPPED | OUTPATIENT
Start: 2023-08-28 | End: 2024-02-24

## 2023-08-28 NOTE — PROGRESS NOTES
Virtual Regular Visit    Verification of patient location:    Patient is located at {[facility] Virtual Patient Location:[address]} in the following state in which I hold an active license {[facility] virtual patient location:[address]}      Assessment/Plan:    Problem List Items Addressed This Visit    None           Reason for visit is No chief complaint on file. Encounter provider Rodolfo Knapp MD    Provider located at [address] [address] [phone]      Recent Visits  No visits were found meeting these conditions. Showing recent visits within past 7 days and meeting all other requirements  Future Appointments  No visits were found meeting these conditions. Showing future appointments within next 150 days and meeting all other requirements       The patient was identified by name and date of birth. Duane Smoker was informed that this is a telemedicine visit and that the visit is being conducted through {AMB VIRTUAL VISIT ZEUOHO:83690}. {Telemedicine confidentiality :88869} {Telemedicine participants:46761}  She acknowledged consent and understanding of privacy and security of the video platform. The patient has agreed to participate and understands they can discontinue the visit at any time. Patient is aware this is a billable service. Subjective  Duane Smoker is a 47 y.o. female *** .       HPI     Past Medical History:   Diagnosis Date   • Anemia     resolved 12/08/16   • Anxiety    • Asthma    • Chlamydia     in her 25s   • Depression    • Fibroid    • HSV-1 infection 01/03/2023   • Hypertension    • Mental disorder    • Trichomonal vulvovaginitis     last assessed 02/14/13       Past Surgical History:   Procedure Laterality Date   • APPENDECTOMY     • [place] LAPS TOTAL HYSTERECT 250 GM/< W/RMVL TUBE/OVARY N/A 5/31/2018    Procedure: ROBOTIC TOTAL LAPAROSCOPIC HYSTERECTOMY,    BILATERAL SALPINGECTOMY AND INTRA-OP CYSTO;  Surgeon: Malu MD Jose;  [facility];  Service: Gynecology Oncology   • TUBAL LIGATION         Current Outpatient Medications   Medication Sig Dispense Refill   • amLODIPine (NORVASC) 5 mg tablet Take 1 tablet (5 mg total) by mouth daily 30 tablet 5   • EPINEPHrine (EPIPEN) 0.3 mg/0.3 mL SOAJ Inject 0.3 mL (0.3 mg total) into a muscle once for 1 dose (Patient not taking: Reported on 4/24/2023) 0.6 mL 0   • hyoscyamine (LEVSIN/SL) 0.125 mg SL tablet Take 1 tablet (0.125 mg total) by mouth every 4 (four) hours as needed for cramping 20 tablet 1   • naproxen (Naprosyn) 500 mg tablet Take 1 tablet (500 mg total) by mouth 2 (two) times a day with meals 60 tablet 5   • ondansetron (Zofran ODT) 4 mg disintegrating tablet Take 1 tablet (4 mg total) by mouth every 6 (six) hours as needed for nausea or vomiting 20 tablet 0   • traZODone (DESYREL) 50 mg tablet Take 1 tablet (50 mg total) by mouth daily at bedtime 30 tablet 5     No current facility-administered medications for this visit. Facility-Administered Medications Ordered in Other Visits   Medication Dose Route Frequency Provider Last Rate Last Admin   • albuterol (PROVENTIL HFA,VENTOLIN HFA) inhaler 2 puff  2 puff Inhalation Once Erenest Lopez Hay, DO            Allergies   Allergen Reactions   • Barley Grass - Food Allergy Hives       Review of Systems    Video Exam    There were no vitals filed for this visit.     Physical Exam     Visit Time  Total Visit Duration: ***

## 2023-08-28 NOTE — LETTER
August 28, 2023     Patient: Mary Oglesby  YOB: 1970  Date of Visit: 8/28/2023      To Whom it May Concern: Mary Oglesby is under my professional care. Anusha Eng was seen in my office on 8/28/2023. Anusha Eng may return to work on 8/29/23 . If you have any questions or concerns, please don't hesitate to call.          Sincerely,          Nohemy Espinal MD        CC: No Recipients

## 2023-08-28 NOTE — PROGRESS NOTES
Virtual Regular Visit    Verification of patient location:    Patient is located at Home in the following state in which I hold an active license [place]      Assessment/Plan:    Problem List Items Addressed This Visit        Other    Anxiety - Primary     Restart Lexapro, check lab due  To dizziness, f/u  With in office  Visit  2 weeks         Relevant Medications    escitalopram (LEXAPRO) 10 mg tablet    Other Relevant Orders    TSH, 3rd generation    CBC and differential    Comprehensive metabolic panel   Other Visit Diagnoses     Palpitations        Relevant Orders    TSH, 3rd generation    CBC and differential    Comprehensive metabolic panel    Dizziness        Relevant Orders    TSH, 3rd generation    CBC and differential    Comprehensive metabolic panel               Reason for visit is   Chief Complaint   Patient presents with   • Virtual Regular Visit        Encounter provider Jl Gutierrez MD    Provider located at [address] [address] [phone]      Recent Visits  No visits were found meeting these conditions. Showing recent visits within past 7 days and meeting all other requirements  Today's Visits  Date Type Provider Dept   08/28/23 Telemedicine Jl Gtuierrez MD [facility]   Showing today's visits and meeting all other requirements  Future Appointments  No visits were found meeting these conditions. Showing future appointments within next 150 days and meeting all other requirements       The patient was identified by name and date of birth. Alma Duvall was informed that this is a telemedicine visit and that the visit is being conducted through the [employer]. She agrees to proceed. .  My office door was closed. No one else was in the room. She acknowledged consent and understanding of privacy and security of the video platform.  The patient has agreed to participate and understands they can discontinue the visit at any time. Patient is aware this is a billable service. Subjective  Meeta Shanks is a 47 y.o. female       Having  Increased anxiety, dizziness , occ dyspnea, feels  Like bP is  Up, wants to go back on Lexapro for anxiety, stressed a t work and  Worried  About  son       Past Medical History:   Diagnosis Date   • Anemia     resolved 12/08/16   • Anxiety    • Asthma    • Chlamydia     in her 25s   • Depression    • Fibroid    • HSV-1 infection 01/03/2023   • Hypertension    • Mental disorder    • Trichomonal vulvovaginitis     last assessed 02/14/13       Past Surgical History:   Procedure Laterality Date   • APPENDECTOMY     • MD LAPS TOTAL HYSTERECT 250 GM/< W/RMVL TUBE/OVARY N/A 5/31/2018    Procedure: ROBOTIC TOTAL LAPAROSCOPIC HYSTERECTOMY,    BILATERAL SALPINGECTOMY AND INTRA-OP CYSTO;  Surgeon: Acacia Michaud MD;  Location: [facility];  Service: Gynecology Oncology   • TUBAL LIGATION         Current Outpatient Medications   Medication Sig Dispense Refill   • amLODIPine (NORVASC) 5 mg tablet Take 1 tablet (5 mg total) by mouth daily 30 tablet 5   • escitalopram (LEXAPRO) 10 mg tablet Take 1 tablet (10 mg total) by mouth daily 30 tablet 5   • naproxen (Naprosyn) 500 mg tablet Take 1 tablet (500 mg total) by mouth 2 (two) times a day with meals 60 tablet 5   • traZODone (DESYREL) 50 mg tablet Take 1 tablet (50 mg total) by mouth daily at bedtime 30 tablet 5   • EPINEPHrine (EPIPEN) 0.3 mg/0.3 mL SOAJ Inject 0.3 mL (0.3 mg total) into a muscle once for 1 dose (Patient not taking: Reported on 4/24/2023) 0.6 mL 0     No current facility-administered medications for this visit.      Facility-Administered Medications Ordered in Other Visits   Medication Dose Route Frequency Provider Last Rate Last Admin   • albuterol (PROVENTIL HFA,VENTOLIN HFA) inhaler 2 puff  2 puff Inhalation Once Sunflower Magana, DO            Allergies   Allergen Reactions   • [place]ley [place] - Food Allergy Hives Review of Systems   Constitutional: Negative for activity change, appetite change, fatigue and unexpected weight change. Respiratory: Positive for shortness of breath. Cardiovascular: Positive for palpitations. Negative for chest pain. Neurological: Positive for dizziness and light-headedness. Negative for headaches. Psychiatric/Behavioral: The patient is nervous/anxious. Video Exam    Vitals:    08/28/23 1035   Weight: 77.6 kg (171 lb)   Height: 5' 1" (1.549 m)       Physical Exam  Vitals reviewed. Constitutional:       Appearance: Normal appearance. Eyes:      General:         Right eye: No discharge. Left eye: No discharge. Pulmonary:      Effort: Pulmonary effort is normal.   Lymphadenopathy:      Cervical: No cervical adenopathy. Neurological:      Mental Status: She is alert. Psychiatric:         Mood and Affect: Mood is anxious.           Visit Time  Total Visit Duration: 10

## 2023-08-29 ENCOUNTER — TELEPHONE (OUTPATIENT)
Dept: FAMILY MEDICINE CLINIC | Facility: CLINIC | Age: 53
End: 2023-08-29

## 2023-08-29 NOTE — TELEPHONE ENCOUNTER
Patient requesting return to work note to be extended for today 8/29/23.     Please review  Thank you

## 2023-11-13 DIAGNOSIS — M25.50 ARTHRALGIA, UNSPECIFIED JOINT: ICD-10-CM

## 2023-11-13 DIAGNOSIS — I10 PRIMARY HYPERTENSION: ICD-10-CM

## 2023-11-13 RX ORDER — AMLODIPINE BESYLATE 5 MG/1
5 TABLET ORAL DAILY
Qty: 30 TABLET | Refills: 5 | Status: SHIPPED | OUTPATIENT
Start: 2023-11-13

## 2023-11-13 RX ORDER — NAPROXEN 500 MG/1
500 TABLET ORAL 2 TIMES DAILY WITH MEALS
Qty: 60 TABLET | Refills: 5 | Status: SHIPPED | OUTPATIENT
Start: 2023-11-13

## 2023-11-13 NOTE — TELEPHONE ENCOUNTER
Patient called she needs to change her pharmacy to [facility] on [address]. She needs refills of the Amlodipine and Naproxen if they can be called into [facility] if any problems please give her a call.

## 2024-03-13 DIAGNOSIS — I10 PRIMARY HYPERTENSION: ICD-10-CM

## 2024-03-13 RX ORDER — AMLODIPINE BESYLATE 5 MG/1
5 TABLET ORAL DAILY
Qty: 90 TABLET | Refills: 1 | Status: SHIPPED | OUTPATIENT
Start: 2024-03-13

## 2024-07-11 DIAGNOSIS — M25.50 ARTHRALGIA, UNSPECIFIED JOINT: ICD-10-CM

## 2024-07-11 RX ORDER — NAPROXEN 500 MG/1
500 TABLET ORAL 2 TIMES DAILY WITH MEALS
Qty: 60 TABLET | Refills: 5 | Status: SHIPPED | OUTPATIENT
Start: 2024-07-11

## 2024-09-16 ENCOUNTER — OFFICE VISIT (OUTPATIENT)
Dept: FAMILY MEDICINE CLINIC | Facility: CLINIC | Age: 54
End: 2024-09-16
Payer: COMMERCIAL

## 2024-09-16 ENCOUNTER — TELEPHONE (OUTPATIENT)
Age: 54
End: 2024-09-16

## 2024-09-16 VITALS
HEART RATE: 78 BPM | BODY MASS INDEX: 36 KG/M2 | SYSTOLIC BLOOD PRESSURE: 130 MMHG | WEIGHT: 178.6 LBS | OXYGEN SATURATION: 98 % | HEIGHT: 59 IN | DIASTOLIC BLOOD PRESSURE: 80 MMHG

## 2024-09-16 DIAGNOSIS — G47.09 OTHER INSOMNIA: ICD-10-CM

## 2024-09-16 DIAGNOSIS — F41.9 ANXIETY: ICD-10-CM

## 2024-09-16 DIAGNOSIS — G56.91 NEUROPATHY, ARM, RIGHT: Primary | ICD-10-CM

## 2024-09-16 DIAGNOSIS — M25.511 ACUTE PAIN OF RIGHT SHOULDER: ICD-10-CM

## 2024-09-16 DIAGNOSIS — I10 PRIMARY HYPERTENSION: ICD-10-CM

## 2024-09-16 PROCEDURE — 99214 OFFICE O/P EST MOD 30 MIN: CPT | Performed by: NURSE PRACTITIONER

## 2024-09-16 RX ORDER — GABAPENTIN 300 MG/1
300 CAPSULE ORAL
Qty: 30 CAPSULE | Refills: 1 | Status: SHIPPED | OUTPATIENT
Start: 2024-09-16

## 2024-09-16 RX ORDER — PREDNISONE 10 MG/1
TABLET ORAL
Qty: 30 TABLET | Refills: 0 | Status: SHIPPED | OUTPATIENT
Start: 2024-09-16

## 2024-09-16 RX ORDER — TRAZODONE HYDROCHLORIDE 50 MG/1
50 TABLET, FILM COATED ORAL
Qty: 90 TABLET | Refills: 1 | Status: SHIPPED | OUTPATIENT
Start: 2024-09-16

## 2024-09-16 NOTE — PROGRESS NOTES
Ambulatory Visit  Name: Tiffany Escalera      : 1970      MRN: [id number]  Encounter Provider: STERLING Reid  Encounter Date: 2024   Encounter department: [facility]    Assessment & Plan  Neuropathy, arm, right  Patient symptoms are consistent with possible cervical radiculopathy.  X-ray of the cervical spine was ordered to assess for any osseous abnormalities which could be contributing to the symptoms.  X-ray of the right shoulder was also ordered to assess for any signs of nerve entrapment in this area.  EMG of the right upper extremity was ordered to assess for nerve conduction abnormalities as well.  Prednisone taper was ordered to help with acute inflammation and patient will also be started on gabapentin 300 mg at bedtime to help with neuropathy of the right upper extremity.  Orthopedics referral was also placed for further management.  Orders:    XR spine cervical complete 4 or 5 vw non injury; Future    EMG 1 Limb; Future    Ambulatory Referral to Orthopedic Surgery; Future    XR shoulder 2+ vw right; Future    predniSONE 10 mg tablet; Use 5 tablets for 2 days. Use 4 tablets for 2 days. Use 3 tablets for 2 days. Use 2 tablets for 2 days. Use 1 tablet for 2 days.    gabapentin (Neurontin) 300 mg capsule; Take 1 capsule (300 mg total) by mouth daily at bedtime    Anxiety  Trazodone was reordered to be used at bedtime as directed.  Orders:    traZODone (DESYREL) 50 mg tablet; Take 1 tablet (50 mg total) by mouth daily at bedtime    Other insomnia  Trazodone was reordered to be used at bedtime as directed.  Orders:    traZODone (DESYREL) 50 mg tablet; Take 1 tablet (50 mg total) by mouth daily at bedtime    Acute pain of right shoulder    Orders:    XR shoulder 2+ vw right; Future    predniSONE 10 mg tablet; Use 5 tablets for 2 days. Use 4 tablets for 2 days. Use 3 tablets for 2 days. Use 2 tablets for 2 days. Use 1 tablet for 2 days.    Primary  hypertension  Well-controlled on current regimen.           Depression Screening and Follow-up Plan: Patient was screened for depression during today's encounter. They screened negative with a PHQ-2 score of 0.      History of Present Illness     RUE pain: Patient reports over the past 2 months she has been experiencing recurrent right upper extremity pain.  She reports that the pain begins in the right upper arm/shoulder and radiates down the arm to the right wrist area.  She does also report some intermittent right-sided neck pain as well as pain in the right trapezius area.  She does report frequently noting paresthesias of the right upper extremity especially when turning her head to the right.  She denies any recent falls or known injuries to the affected areas.    Hypertension: Well-controlled on current dosage of amlodipine.  Patient denies lightheadedness, dizziness, or orthostasis.    Anxiety/insomnia: Well-controlled with at bedtime use of trazodone.          Review of Systems   Constitutional:  Negative for chills and fever.   HENT:  Negative for ear pain and sore throat.    Eyes:  Negative for pain and visual disturbance.   Respiratory:  Negative for cough, chest tightness, shortness of breath and wheezing.    Cardiovascular:  Negative for chest pain, palpitations and leg swelling.   Gastrointestinal:  Negative for abdominal pain, constipation, diarrhea, nausea and vomiting.   Endocrine: Negative for cold intolerance and heat intolerance.   Genitourinary:  Negative for decreased urine volume, dysuria and hematuria.   Musculoskeletal:  Positive for arthralgias (right shoulder), myalgias (RUE) and neck pain (right). Negative for back pain.   Skin:  Negative for color change and rash.   Allergic/Immunologic: Negative for environmental allergies.   Neurological:  Negative for dizziness, seizures, syncope, weakness, light-headedness, numbness and headaches.   Hematological:  Negative for adenopathy.  "  Psychiatric/Behavioral:  Negative for confusion. The patient is not nervous/anxious.    All other systems reviewed and are negative.          Objective     /80 (BP Location: Left arm, Patient Position: Sitting, Cuff Size: Adult)   Pulse 78   Ht 4' 11\" (1.499 m)   Wt 81 kg (178 lb 9.6 oz)   LMP 03/31/2018   SpO2 98%   BMI 36.07 kg/m²     Physical Exam  Vitals and nursing note reviewed.   Constitutional:       General: She is not in acute distress.     Appearance: Normal appearance. She is well-developed. She is not ill-appearing.   HENT:      Head: Normocephalic.   Eyes:      Conjunctiva/sclera: Conjunctivae normal.   Neck:      Comments: Patient denied any midline cervical tenderness with palpation.  She did report pain with palpation throughout the right cervical paraspinals and trapezius area.  Patient did also report pain when turning her head to the right side.  Spurling's test was positive on the right for cervical nerve root compression.  Cardiovascular:      Rate and Rhythm: Normal rate and regular rhythm.      Pulses: Normal pulses.           Carotid pulses are 2+ on the right side and 2+ on the left side.       Radial pulses are 2+ on the right side and 2+ on the left side.        Posterior tibial pulses are 2+ on the right side and 2+ on the left side.      Heart sounds: Normal heart sounds. No murmur heard.  Pulmonary:      Effort: Pulmonary effort is normal. No respiratory distress.      Breath sounds: Normal breath sounds. No decreased breath sounds, wheezing, rhonchi or rales.   Abdominal:      General: Abdomen is flat. Bowel sounds are normal. There is no distension.      Palpations: Abdomen is soft.      Tenderness: There is no abdominal tenderness. There is no guarding.   Musculoskeletal:         General: No swelling. Normal range of motion.      Right shoulder: Bony tenderness present. No swelling, effusion, tenderness or crepitus. Normal range of motion. Normal strength.      Right " wrist: No swelling, effusion, tenderness, bony tenderness, snuff box tenderness or crepitus. Normal range of motion.      Cervical back: Normal range of motion and neck supple. Tenderness present. No swelling, edema, erythema, rigidity, spasms, bony tenderness or crepitus. Pain with movement and muscular tenderness present. No spinous process tenderness. Normal range of motion.      Right lower leg: No edema.      Left lower leg: No edema.      Comments: Patient did have a full range of motion of the right shoulder but did report some pain localized to the rotator cuff area with movement.  She also reported pain with palpation in the rotator cuff area.  Right lateral arm raise, hornblower's sign, and Apley's scratch test were all negative for rotator cuff pathology.    No abnormalities were noted in the right wrist.  No signs of CTS pathology were noted.   Skin:     General: Skin is warm and dry.      Capillary Refill: Capillary refill takes less than 2 seconds.   Neurological:      General: No focal deficit present.      Mental Status: She is alert and oriented to person, place, and time.   Psychiatric:         Mood and Affect: Mood normal.         Behavior: Behavior normal.         Thought Content: Thought content normal.         Judgment: Judgment normal.

## 2024-09-16 NOTE — ASSESSMENT & PLAN NOTE
Patient symptoms are consistent with possible cervical radiculopathy.  X-ray of the cervical spine was ordered to assess for any osseous abnormalities which could be contributing to the symptoms.  X-ray of the right shoulder was also ordered to assess for any signs of nerve entrapment in this area.  EMG of the right upper extremity was ordered to assess for nerve conduction abnormalities as well.  Prednisone taper was ordered to help with acute inflammation and patient will also be started on gabapentin 300 mg at bedtime to help with neuropathy of the right upper extremity.  Orthopedics referral was also placed for further management.  Orders:    XR spine cervical complete 4 or 5 vw non injury; Future    EMG 1 Limb; Future    Ambulatory Referral to Orthopedic Surgery; Future    XR shoulder 2+ vw right; Future    predniSONE 10 mg tablet; Use 5 tablets for 2 days. Use 4 tablets for 2 days. Use 3 tablets for 2 days. Use 2 tablets for 2 days. Use 1 tablet for 2 days.    gabapentin (Neurontin) 300 mg capsule; Take 1 capsule (300 mg total) by mouth daily at bedtime

## 2024-09-16 NOTE — ASSESSMENT & PLAN NOTE
Trazodone was reordered to be used at bedtime as directed.  Orders:    traZODone (DESYREL) 50 mg tablet; Take 1 tablet (50 mg total) by mouth daily at bedtime

## 2024-09-16 NOTE — TELEPHONE ENCOUNTER
Pt forgot to request a work note for her appointment today. Please write note and inform her when it's ready.

## 2024-09-17 NOTE — TELEPHONE ENCOUNTER
Letter has been created, I have called pt and left vm advising note was created as needed.     If pt calls back please advice pt she may get note through MyChart or  letter.     Thank you,   Aleida

## 2024-09-27 DIAGNOSIS — I10 PRIMARY HYPERTENSION: ICD-10-CM

## 2024-09-27 RX ORDER — AMLODIPINE BESYLATE 5 MG/1
TABLET ORAL DAILY
Qty: 90 TABLET | Refills: 1 | Status: SHIPPED | OUTPATIENT
Start: 2024-09-27

## 2024-12-20 ENCOUNTER — TELEPHONE (OUTPATIENT)
Dept: FAMILY MEDICINE CLINIC | Facility: CLINIC | Age: 54
End: 2024-12-20

## 2024-12-20 NOTE — TELEPHONE ENCOUNTER
"Pt came into office for an appointment with Noel Raymond, pt appt was for 4:00 PM pt had been the last pt and was soon late 10 minutes into scheduled time.     I advice pt she was the last pt of today and would have to ask provider if he wished to still see pt. I spoke with Noel and he did state he will not be seeing her, once doing I told pt and she soon said. \"That's [facility]-.\" As well during the time I stated to her she may need to reschedule pt stated. \"I can't control the weather.\"     I understood but advice pt she would not be seen and she soon left without canceling. Appt has been canceled  as needed .     Thank you,   Aleida RABAGO    "

## 2025-01-10 DIAGNOSIS — M25.50 ARTHRALGIA, UNSPECIFIED JOINT: ICD-10-CM

## 2025-01-13 RX ORDER — NAPROXEN 500 MG/1
500 TABLET ORAL 2 TIMES DAILY WITH MEALS
Qty: 60 TABLET | Refills: 5 | Status: SHIPPED | OUTPATIENT
Start: 2025-01-13

## 2025-03-11 DIAGNOSIS — G47.09 OTHER INSOMNIA: ICD-10-CM

## 2025-03-11 DIAGNOSIS — F41.9 ANXIETY: ICD-10-CM

## 2025-03-11 RX ORDER — TRAZODONE HYDROCHLORIDE 50 MG/1
50 TABLET ORAL
Qty: 90 TABLET | Refills: 1 | Status: SHIPPED | OUTPATIENT
Start: 2025-03-11

## 2025-03-24 DIAGNOSIS — I10 PRIMARY HYPERTENSION: ICD-10-CM

## 2025-03-24 RX ORDER — AMLODIPINE BESYLATE 5 MG/1
5 TABLET ORAL DAILY
Qty: 90 TABLET | Refills: 1 | Status: SHIPPED | OUTPATIENT
Start: 2025-03-24

## 2025-06-18 ENCOUNTER — APPOINTMENT (OUTPATIENT)
Dept: URGENT CARE | Age: 55
End: 2025-06-18

## 2025-07-19 DIAGNOSIS — M25.50 ARTHRALGIA, UNSPECIFIED JOINT: ICD-10-CM

## 2025-07-21 RX ORDER — NAPROXEN 500 MG/1
500 TABLET ORAL 2 TIMES DAILY WITH MEALS
Qty: 30 TABLET | Refills: 0 | Status: SHIPPED | OUTPATIENT
Start: 2025-07-21

## 2025-07-24 ENCOUNTER — TELEPHONE (OUTPATIENT)
Dept: FAMILY MEDICINE CLINIC | Facility: CLINIC | Age: 55
End: 2025-07-24

## 2025-07-24 ENCOUNTER — DOCUMENTATION (OUTPATIENT)
Dept: ADMINISTRATIVE | Facility: OTHER | Age: 55
End: 2025-07-24

## 2025-07-24 NOTE — TELEPHONE ENCOUNTER
On 1/16/2025 patient established care with Casie Valadez DO   [address] [place] [address]   [address]   [place], [facility]   [phone] (Work)   [phone] (Fax

## 2025-07-24 NOTE — TELEPHONE ENCOUNTER
07/24/25 10:55 AM     The office's request has been received and reviewed.    The PCP has been successfully removed with a patient attribution note.     This message will now be completed.    Thank you  Radha Osullivan
